# Patient Record
Sex: FEMALE
[De-identification: names, ages, dates, MRNs, and addresses within clinical notes are randomized per-mention and may not be internally consistent; named-entity substitution may affect disease eponyms.]

---

## 2018-06-03 ENCOUNTER — HOSPITAL (OUTPATIENT)
Dept: OTHER | Age: 83
End: 2018-06-03
Attending: EMERGENCY MEDICINE

## 2023-09-11 ENCOUNTER — OFFICE VISIT (OUTPATIENT)
Dept: MEDICAL GROUP | Facility: MEDICAL CENTER | Age: 88
End: 2023-09-11
Payer: MEDICARE

## 2023-09-11 VITALS
DIASTOLIC BLOOD PRESSURE: 68 MMHG | BODY MASS INDEX: 28.41 KG/M2 | WEIGHT: 154.4 LBS | HEART RATE: 67 BPM | HEIGHT: 62 IN | SYSTOLIC BLOOD PRESSURE: 128 MMHG | TEMPERATURE: 97.4 F | OXYGEN SATURATION: 93 % | RESPIRATION RATE: 18 BRPM

## 2023-09-11 DIAGNOSIS — Z86.79 S/P ABLATION OF ATRIAL FIBRILLATION: ICD-10-CM

## 2023-09-11 DIAGNOSIS — Z95.5 S/P CORONARY ARTERY STENT PLACEMENT: ICD-10-CM

## 2023-09-11 DIAGNOSIS — Z98.890 S/P ABLATION OF ATRIAL FIBRILLATION: ICD-10-CM

## 2023-09-11 DIAGNOSIS — R05.3 CHRONIC COUGH: ICD-10-CM

## 2023-09-11 DIAGNOSIS — G89.29 CHRONIC BILATERAL LOW BACK PAIN WITHOUT SCIATICA: ICD-10-CM

## 2023-09-11 DIAGNOSIS — R06.2 WHEEZING: ICD-10-CM

## 2023-09-11 DIAGNOSIS — I25.2 HISTORY OF MI (MYOCARDIAL INFARCTION): ICD-10-CM

## 2023-09-11 DIAGNOSIS — K21.9 GASTROESOPHAGEAL REFLUX DISEASE, UNSPECIFIED WHETHER ESOPHAGITIS PRESENT: ICD-10-CM

## 2023-09-11 DIAGNOSIS — J30.2 SEASONAL ALLERGIES: ICD-10-CM

## 2023-09-11 DIAGNOSIS — M54.50 CHRONIC BILATERAL LOW BACK PAIN WITHOUT SCIATICA: ICD-10-CM

## 2023-09-11 DIAGNOSIS — G89.29 CHRONIC PAIN OF BOTH KNEES: ICD-10-CM

## 2023-09-11 DIAGNOSIS — Z87.898 H/O WHEEZING: ICD-10-CM

## 2023-09-11 DIAGNOSIS — H35.30 MACULAR DEGENERATION OF RIGHT EYE, UNSPECIFIED TYPE: ICD-10-CM

## 2023-09-11 DIAGNOSIS — M25.561 CHRONIC PAIN OF BOTH KNEES: ICD-10-CM

## 2023-09-11 DIAGNOSIS — Z23 NEED FOR VACCINATION: ICD-10-CM

## 2023-09-11 DIAGNOSIS — M25.562 CHRONIC PAIN OF BOTH KNEES: ICD-10-CM

## 2023-09-11 PROCEDURE — G0009 ADMIN PNEUMOCOCCAL VACCINE: HCPCS | Performed by: STUDENT IN AN ORGANIZED HEALTH CARE EDUCATION/TRAINING PROGRAM

## 2023-09-11 PROCEDURE — 3078F DIAST BP <80 MM HG: CPT | Performed by: STUDENT IN AN ORGANIZED HEALTH CARE EDUCATION/TRAINING PROGRAM

## 2023-09-11 PROCEDURE — 99204 OFFICE O/P NEW MOD 45 MIN: CPT | Mod: 25 | Performed by: STUDENT IN AN ORGANIZED HEALTH CARE EDUCATION/TRAINING PROGRAM

## 2023-09-11 PROCEDURE — 90677 PCV20 VACCINE IM: CPT | Performed by: STUDENT IN AN ORGANIZED HEALTH CARE EDUCATION/TRAINING PROGRAM

## 2023-09-11 PROCEDURE — 3074F SYST BP LT 130 MM HG: CPT | Performed by: STUDENT IN AN ORGANIZED HEALTH CARE EDUCATION/TRAINING PROGRAM

## 2023-09-11 PROCEDURE — 90662 IIV NO PRSV INCREASED AG IM: CPT | Performed by: STUDENT IN AN ORGANIZED HEALTH CARE EDUCATION/TRAINING PROGRAM

## 2023-09-11 PROCEDURE — G0008 ADMIN INFLUENZA VIRUS VAC: HCPCS | Performed by: STUDENT IN AN ORGANIZED HEALTH CARE EDUCATION/TRAINING PROGRAM

## 2023-09-11 RX ORDER — ATORVASTATIN CALCIUM 80 MG/1
80 TABLET, FILM COATED ORAL NIGHTLY
COMMUNITY

## 2023-09-11 RX ORDER — TRAMADOL HYDROCHLORIDE 50 MG/1
25 TABLET ORAL 2 TIMES DAILY
Qty: 30 TABLET | Refills: 0 | Status: SHIPPED | OUTPATIENT
Start: 2023-09-11 | End: 2023-10-11

## 2023-09-11 RX ORDER — METOPROLOL SUCCINATE 50 MG/1
50 TABLET, EXTENDED RELEASE ORAL
COMMUNITY
Start: 2023-09-06

## 2023-09-11 RX ORDER — LANSOPRAZOLE 30 MG/1
30 CAPSULE, DELAYED RELEASE ORAL
COMMUNITY
Start: 2023-07-23 | End: 2023-09-11 | Stop reason: SDUPTHER

## 2023-09-11 RX ORDER — CLOPIDOGREL BISULFATE 75 MG/1
TABLET ORAL
COMMUNITY
Start: 2023-09-05

## 2023-09-11 RX ORDER — ALBUTEROL SULFATE 90 UG/1
2 AEROSOL, METERED RESPIRATORY (INHALATION) EVERY 4 HOURS PRN
Qty: 1 EACH | Refills: 1 | Status: SHIPPED | OUTPATIENT
Start: 2023-09-11 | End: 2023-11-16 | Stop reason: SDUPTHER

## 2023-09-11 RX ORDER — CELECOXIB 200 MG/1
200 CAPSULE ORAL EVERY MORNING
COMMUNITY
Start: 2023-09-06

## 2023-09-11 RX ORDER — LANSOPRAZOLE 30 MG/1
30 CAPSULE, DELAYED RELEASE ORAL
Qty: 90 CAPSULE | Refills: 0 | Status: SHIPPED | OUTPATIENT
Start: 2023-09-11 | End: 2023-12-11

## 2023-09-11 RX ORDER — TRAMADOL HYDROCHLORIDE 50 MG/1
25 TABLET ORAL 2 TIMES DAILY
COMMUNITY
Start: 2023-06-28 | End: 2023-09-11 | Stop reason: SDUPTHER

## 2023-09-11 ASSESSMENT — PATIENT HEALTH QUESTIONNAIRE - PHQ9: CLINICAL INTERPRETATION OF PHQ2 SCORE: 0

## 2023-09-11 NOTE — LETTER
[email or web address]  Tali López M.D.  [address]  [place] [address]  Fax: [phone]   Authorization for Release/Disclosure of   Protected Health Information   Name: TRAE FRENCH : 10/31/1927 SSN: [id number]   Address: [address] [place] [address]  [place] [address] Phone:    There are no phone numbers on file.   I authorize the entity listed below to release/disclose the PHI below to:   [facility]/Tali López M.D. and Tali López M.D.   Provider or Entity Name:     Address   City, State, Zip   Phone:      Fax:     Reason for request: continuity of care   Information to be released:    [  ] LAST COLONOSCOPY,  including any PATH REPORT and follow-up  [  ] LAST FIT/COLOGUARD RESULT [  ] LAST DEXA  [  ] LAST MAMMOGRAM  [  ] LAST PAP  [  ] LAST LABS [  ] RETINA EXAM REPORT  [  ] IMMUNIZATION RECORDS  [ xxxx ] Release all info      [  ] Check here and initial the line next to each item to release ALL health information INCLUDING  _____ Care and treatment for drug and / or alcohol abuse  _____ HIV testing, infection status, or AIDS  _____ Genetic Testing    DATES OF SERVICE OR TIME PERIOD TO BE DISCLOSED: _____________  I understand and acknowledge that:  * This Authorization may be revoked at any time by you in writing, except if your health information has already been used or disclosed.  * Your health information that will be used or disclosed as a result of you signing this authorization could be re-disclosed by the recipient. If this occurs, your re-disclosed health information may no longer be protected by State or Federal laws.  * You may refuse to sign this Authorization. Your refusal will not affect your ability to obtain treatment.  * This Authorization becomes effective upon signing and will  on (date) __________.      If no date is indicated, this Authorization will  one (1) year from the signature date.    Name: Trae French  Signature: Date:   2023     PLEASE FAX  REQUESTED RECORDS BACK TO: [phone]

## 2023-09-11 NOTE — PROGRESS NOTES
Subjective:   New patient     CC:  Diagnoses of Need for vaccination, History of MI (myocardial infarction), S/P coronary artery stent placement, Chronic bilateral low back pain without sciatica, Chronic pain of both knees, S/P ablation of atrial fibrillation, Chronic cough, H/O wheezing, Wheezing, Seasonal allergies, Macular degeneration of right eye, unspecified type, and Gastroesophageal reflux disease, unspecified whether esophagitis present were pertinent to this visit.    HISTORY OF THE PRESENT ILLNESS: Patient is a 89 y.o. female. This pleasant patient is here today to establish care and discuss chronic medical concerns.  Patient moved to [place] from [facility] recently and is living with her daughter.  Patient accompanied by her daughter and granddaughter who is also a pharmacist.      Problem   S/P Coronary Artery Stent Placement    2020 had acute MI   3 stents placed   On Plavix 75 mg daily. No aspirin   Metoprolol 50 mg daily         Chronic Pain of Both Knees    Chronic,   Getting steroid shots - in [place] from orthopedics   Currently on celecoxib 200 mg BI and tramadol 25 mg BID as needed     S/P Ablation of Atrial Fibrillation    Patient with history of atrial fibrillation.  Now status post ablation  She is currently on metoprolol 50 mg once daily not on anticoagulation therapy.  She takes Plavix 75 mg for history of CAD with stent  Following up with cardiology     Chronic Cough    Patient with history of chronic cough and seasonal allergies.  Patient and family showing concern for possible asthma as mentioned by one of her previous providers.  Less likely but we will get PFTs done to confirm.  Requesting refill for albuterol inhaler.         Health Maintenance: Completed    ROS:   Review of Systems   Constitutional:  Negative for fever and malaise/fatigue.   HENT:  Negative for congestion and sore throat.    Eyes:  Positive for blurred vision.   Respiratory:  Positive for cough. Negative for shortness  "of breath and wheezing.    Cardiovascular:  Negative for chest pain, palpitations and leg swelling.   Gastrointestinal:  Negative for blood in stool, heartburn and nausea.   Genitourinary:  Negative for dysuria and urgency.   Musculoskeletal:  Positive for back pain and joint pain. Negative for falls and myalgias.   Neurological:  Negative for dizziness and headaches.   Psychiatric/Behavioral:  Negative for depression and suicidal ideas.          Objective:       Exam: /68 (BP Location: Left arm, Patient Position: Sitting, BP Cuff Size: Large adult)   Pulse 67   Temp 36.3 °C (97.4 °F) (Temporal)   Resp 18   Ht 1.575 m (5' 2\")   Wt 70 kg (154 lb 6.4 oz)   SpO2 93%  Body mass index is 28.24 kg/m².    Physical Exam  Constitutional:       Appearance: Normal appearance.   HENT:      Head: Normocephalic.   Eyes:      General: No scleral icterus.  Cardiovascular:      Rate and Rhythm: Normal rate and regular rhythm.      Pulses: Normal pulses.      Heart sounds: Normal heart sounds.   Pulmonary:      Effort: Pulmonary effort is normal.      Breath sounds: Normal breath sounds.   Musculoskeletal:      Right lower leg: No edema.      Left lower leg: No edema.   Skin:     General: Skin is warm.   Neurological:      Mental Status: She is alert and oriented to person, place, and time.   Psychiatric:         Mood and Affect: Mood normal.         Behavior: Behavior normal.         Labs: Reviewed    Assessment & Plan:   89 y.o. female with the following -     Chronic cough   Wheezing    Patient with history of chronic dry cough and intermittent episodes of wheezing.  Patient and family member requesting for PFTs  Plan  - PULMONARY FUNCTION TESTS -Test requested: Spirometry with-out & with Bronchodilator; Future  - albuterol 108 (90 Base) MCG/ACT Aero Soln inhalation aerosol; Inhale 2 Puffs every four hours as needed for Shortness of Breath.  Dispense: 1 Each; Refill:     History of MI (myocardial infarction)  S/P " coronary artery stent placement   S/P ablation of atrial fibrillation  Chronic, stable  Continue Plavix 75 mg, Lipitor 80 mg daily.  Continue metoprolol 50 mg once a day.  Would place a referral to cardiology for follow-up  - REFERRAL TO CARDIOLOGY    Chronic bilateral low back pain without sciatica  Chronic pain of both knees  Patient with history of chronic pain in both knee and low back pain . Previously following up with pain specialist in [place] and getting intra-articular joint steroid shots.  Patient requesting referral to pain specialist or physiatrist  - Referral to Physiatry (PMR)  - traMADol (ULTRAM) 50 MG Tab; Take 0.5 Tablets by mouth 2 times a day for 30 days.  Dispense: 30 Tablet; Refill: 0  - Consent for Opiate Prescription  - Controlled Substance Treatment Agreement    Discussed risk benefit with tramadol including dizziness, drowsiness , overdose risk. Patient reports taking this medication for many years and reports understand the risk.  Controlled substance discussed with client. patient agrees to abide by controlled substance contract. CS contract discussed with patient , scanned in chart.       Gastroesophageal reflux disease, unspecified whether esophagitis present  Chronic, stable  Refill of lansoprazole 30 mg daily sent to pharmacy  - lansoprazole (PREVACID) 30 MG CAPSULE DELAYED RELEASE; Take 1 Capsule by mouth every day.  Dispense: 90 Capsule; Refill: 0    Need for vaccination  Due for annual flu vaccination and pneumonia vaccination  - Influenza Vaccine, High Dose (65+ Only)  - Pneumococcal Conjugate Vaccine 20-Valent (19 yrs+)      Return in about 3 months (around 12/11/2023) for chronic problems.    Please note that this dictation was created using voice recognition software. I have made every reasonable attempt to correct obvious errors, but I expect that there are errors of grammar and possibly content that I did not discover before finalizing the note.

## 2023-09-14 ENCOUNTER — NON-PROVIDER VISIT (OUTPATIENT)
Dept: SLEEP MEDICINE | Facility: MEDICAL CENTER | Age: 88
End: 2023-09-14
Attending: STUDENT IN AN ORGANIZED HEALTH CARE EDUCATION/TRAINING PROGRAM
Payer: MEDICARE

## 2023-09-14 VITALS — WEIGHT: 154 LBS | BODY MASS INDEX: 30.23 KG/M2 | HEIGHT: 60 IN

## 2023-09-14 DIAGNOSIS — R05.3 CHRONIC COUGH: ICD-10-CM

## 2023-09-14 DIAGNOSIS — R06.2 WHEEZING: ICD-10-CM

## 2023-09-14 PROCEDURE — 94060 EVALUATION OF WHEEZING: CPT | Mod: 26 | Performed by: INTERNAL MEDICINE

## 2023-09-14 PROCEDURE — 94060 EVALUATION OF WHEEZING: CPT | Performed by: STUDENT IN AN ORGANIZED HEALTH CARE EDUCATION/TRAINING PROGRAM

## 2023-09-14 ASSESSMENT — PULMONARY FUNCTION TESTS
FEV1/FVC_PREDICTED: 70.8
FEV1_PERCENT_PREDICTED: 116
FEV1/FVC_PERCENT_PREDICTED: 130
FEV1_PERCENT_CHANGE: 3
FEV1/FVC_PERCENT_PREDICTED: 130
FEV1: 1.1
FVC_PERCENT_PREDICTED: 89
FVC: 1.22
FEV1/FVC: 92
FEV1: 1.13
FVC_PREDICTED: 1.37
FEV1_PREDICTED: 113
FEV1/FVC: 92.62
FVC: 1.19
FEV1/FVC_PERCENT_CHANGE: 150
FVC_PERCENT_PREDICTED: 87
FEV1_PERCENT_CHANGE: 2
FEV1_PREDICTED: 0.97

## 2023-09-14 NOTE — PROCEDURES
Technician: Wendy Paulino, RRT, CPFT  Tech notes:  Good pt effort and cooperation. ATS standards not met for reproducibility; BEST EFFORTS REPORTED.  Albuterol HFA 2 puffs via spacer administered.    Interpretation:  There is no significant obstructive ventilatory defect on spirometry.  There is no significant response to bronchodilators.    Flow volume loop is consistent with the above interpretation.    No prior PFTs for comparison.    ITito M.D. am the author of this note (PFT interpretation).    __________  Tito Montgomery MD  Pulmonary and Critical Care Medicine  [facility]

## 2023-09-19 ASSESSMENT — ENCOUNTER SYMPTOMS
FEVER: 0
DEPRESSION: 0
SORE THROAT: 0
PALPITATIONS: 0
DIZZINESS: 0
HEADACHES: 0
BLURRED VISION: 1
COUGH: 1
FALLS: 0
BACK PAIN: 1
HEARTBURN: 0
NAUSEA: 0
BLOOD IN STOOL: 0
MYALGIAS: 0
SHORTNESS OF BREATH: 0
WHEEZING: 0

## 2023-09-26 ENCOUNTER — TELEPHONE (OUTPATIENT)
Dept: HEALTH INFORMATION MANAGEMENT | Facility: OTHER | Age: 88
End: 2023-09-26
Payer: MEDICARE

## 2023-10-12 ENCOUNTER — OFFICE VISIT (OUTPATIENT)
Dept: MEDICAL GROUP | Facility: MEDICAL CENTER | Age: 88
End: 2023-10-12
Payer: MEDICARE

## 2023-10-12 VITALS
BODY MASS INDEX: 29.74 KG/M2 | RESPIRATION RATE: 20 BRPM | HEART RATE: 78 BPM | SYSTOLIC BLOOD PRESSURE: 108 MMHG | WEIGHT: 151.46 LBS | DIASTOLIC BLOOD PRESSURE: 64 MMHG | HEIGHT: 60 IN | TEMPERATURE: 97.9 F | OXYGEN SATURATION: 94 %

## 2023-10-12 DIAGNOSIS — J40 BRONCHITIS: Primary | ICD-10-CM

## 2023-10-12 DIAGNOSIS — J06.9 UPPER RESPIRATORY TRACT INFECTION, UNSPECIFIED TYPE: ICD-10-CM

## 2023-10-12 DIAGNOSIS — M25.562 CHRONIC PAIN OF BOTH KNEES: ICD-10-CM

## 2023-10-12 DIAGNOSIS — G89.29 CHRONIC PAIN OF BOTH KNEES: ICD-10-CM

## 2023-10-12 DIAGNOSIS — M25.561 CHRONIC PAIN OF BOTH KNEES: ICD-10-CM

## 2023-10-12 PROBLEM — M17.0 PRIMARY OSTEOARTHRITIS OF BOTH KNEES: Status: ACTIVE | Noted: 2023-10-12

## 2023-10-12 PROCEDURE — 99214 OFFICE O/P EST MOD 30 MIN: CPT | Performed by: STUDENT IN AN ORGANIZED HEALTH CARE EDUCATION/TRAINING PROGRAM

## 2023-10-12 PROCEDURE — 3074F SYST BP LT 130 MM HG: CPT | Performed by: STUDENT IN AN ORGANIZED HEALTH CARE EDUCATION/TRAINING PROGRAM

## 2023-10-12 PROCEDURE — 3078F DIAST BP <80 MM HG: CPT | Performed by: STUDENT IN AN ORGANIZED HEALTH CARE EDUCATION/TRAINING PROGRAM

## 2023-10-12 RX ORDER — CETIRIZINE HYDROCHLORIDE 10 MG/1
10 TABLET ORAL DAILY
COMMUNITY

## 2023-10-12 RX ORDER — AMOXICILLIN AND CLAVULANATE POTASSIUM 875; 125 MG/1; MG/1
1 TABLET, FILM COATED ORAL 2 TIMES DAILY
Qty: 14 TABLET | Refills: 0 | Status: SHIPPED | OUTPATIENT
Start: 2023-10-12 | End: 2023-10-19

## 2023-10-12 RX ORDER — TRAMADOL HYDROCHLORIDE 50 MG/1
50 TABLET ORAL EVERY 8 HOURS PRN
Qty: 60 TABLET | Refills: 0 | Status: SHIPPED | OUTPATIENT
Start: 2023-10-12 | End: 2023-11-11

## 2023-10-12 RX ORDER — FLUTICASONE PROPIONATE 50 MCG
1 SPRAY, SUSPENSION (ML) NASAL DAILY
COMMUNITY

## 2023-10-22 ASSESSMENT — ENCOUNTER SYMPTOMS
HEADACHES: 1
WHEEZING: 0
BLURRED VISION: 0
FEVER: 0
SHORTNESS OF BREATH: 0
BLOOD IN STOOL: 0
PALPITATIONS: 0
HEARTBURN: 0
MYALGIAS: 0
NECK PAIN: 1
COUGH: 0
FALLS: 0
DEPRESSION: 0
NAUSEA: 0
DIZZINESS: 0
SORE THROAT: 0
BACK PAIN: 1

## 2023-10-23 NOTE — PROGRESS NOTES
Subjective:     CC: Upper respiratory symptoms and cough     HPI:   Chantelle presents today with  Chief Complaint   Patient presents with    Cough     Clear mucus, runny nose x 1 week 1/12       HPI:  Chantelle Sánchez is a 89 y.o. female here for nasal congestion. Cough and chest congestion symptoms for more than a week with slight worsening of symptoms, mild sinus congestion but no pain. She has tried OTC cough medications and nasal spray without much improvement . Denies any current fever,chills,ear pain, sore throat ,SOB or chest pain.       Health Maintenance: Completed    ROS:  Review of Systems   Constitutional:  Negative for fever and malaise/fatigue.   HENT:  Negative for congestion and sore throat.    Eyes:  Negative for blurred vision.   Respiratory:  Negative for cough, shortness of breath and wheezing.    Cardiovascular:  Negative for chest pain, palpitations and leg swelling.   Gastrointestinal:  Negative for blood in stool, heartburn and nausea.   Genitourinary:  Negative for dysuria and urgency.   Musculoskeletal:  Positive for back pain, joint pain and neck pain. Negative for falls and myalgias.   Neurological:  Positive for headaches. Negative for dizziness.   Psychiatric/Behavioral:  Negative for depression and suicidal ideas.        Please see HPI for additional ROS.      Objective:     Exam:  /64 (BP Location: Left arm, Patient Position: Sitting, BP Cuff Size: Large adult)   Pulse 78   Temp 36.6 °C (97.9 °F) (Temporal)   Resp 20   Ht 1.524 m (5') Comment: Pt reported  Wt 68.7 kg (151 lb 7.3 oz)   SpO2 94%   BMI 29.58 kg/m²  Body mass index is 29.58 kg/m².    Physical Exam  Constitutional:       Appearance: Normal appearance.   HENT:      Head: Normocephalic.   Eyes:      General: No scleral icterus.  Cardiovascular:      Rate and Rhythm: Normal rate and regular rhythm.      Pulses: Normal pulses.      Heart sounds: Normal heart sounds.   Pulmonary:      Effort: Pulmonary effort is  normal.      Breath sounds: Normal breath sounds.   Musculoskeletal:      Right lower leg: No edema.      Left lower leg: No edema.   Skin:     General: Skin is warm.   Neurological:      Mental Status: She is alert and oriented to person, place, and time.   Psychiatric:         Mood and Affect: Mood normal.         Behavior: Behavior normal.             Labs: reviewed     Assessment & Plan:     89 y.o. female with the following -     1. Bronchitis  Acute symptoms of cough and URI associated with mild wheezing . Symptoms lasting more than a week.  Would consider antibiotics as patient is high risk for developing bacterial infection with her chronic medical conditions .  - amoxicillin-clavulanate (AUGMENTIN) 875-125 MG Tab; Take 1 Tablet by mouth 2 times a day for 7 days.  Dispense: 14 Tablet; Refill: 0    2. Upper respiratory tract infection, unspecified type  Acute , with mild worsening of symptoms.given symptoms for more than 7 days, would consider antibiotics treatment for possible bacterial superinfection.  Good hydration, OTC tylenol and cough medications as needed  amoxicillin-clavulanate (AUGMENTIN) 875-125 MG Tab; Take 1 Tablet by mouth 2 times a day for 7 days.  Dispense: 14 Tablet; Refill: 0    3. Chronic pain of both knees  Chronic, stable   Patient requesting for refill of her tramadol   Discussed risk and benefits . Discussed BEERS criteria in elderly population . Patient verbalized understanding and states she has been on this pain medication for many years and she is aware of risk. Would like to get the refill and to stop the medication if ay side effects  Plan:  PDMP reviewed   Refill given for a month   Controlled substance contract on file .  - traMADol (ULTRAM) 50 MG Tab; Take 1 Tablet by mouth every 8 hours as needed for Severe Pain for up to 30 days.  Dispense: 60 Tablet; Refill: 0  - Consent for Opiate Prescription        F/u in 3 months for chronic problems    Please note that this dictation  was created using voice recognition software. I have made every reasonable attempt to correct obvious errors, but I expect that there are errors of grammar and possibly content that I did not discover before finalizing the note.

## 2023-11-16 DIAGNOSIS — R06.2 WHEEZING: ICD-10-CM

## 2023-11-17 RX ORDER — ALBUTEROL SULFATE 90 UG/1
2 AEROSOL, METERED RESPIRATORY (INHALATION) EVERY 4 HOURS PRN
Qty: 1 EACH | Refills: 1 | Status: SHIPPED | OUTPATIENT
Start: 2023-11-17

## 2023-11-27 ENCOUNTER — APPOINTMENT (RX ONLY)
Dept: URBAN - METROPOLITAN AREA CLINIC 6 | Facility: CLINIC | Age: 88
Setting detail: DERMATOLOGY
End: 2023-11-27

## 2023-11-27 DIAGNOSIS — L57.0 ACTINIC KERATOSIS: ICD-10-CM

## 2023-11-27 DIAGNOSIS — Z71.89 OTHER SPECIFIED COUNSELING: ICD-10-CM

## 2023-11-27 DIAGNOSIS — D69.2 OTHER NONTHROMBOCYTOPENIC PURPURA: ICD-10-CM

## 2023-11-27 DIAGNOSIS — L81.4 OTHER MELANIN HYPERPIGMENTATION: ICD-10-CM

## 2023-11-27 DIAGNOSIS — L82.1 OTHER SEBORRHEIC KERATOSIS: ICD-10-CM

## 2023-11-27 DIAGNOSIS — B07.8 OTHER VIRAL WARTS: ICD-10-CM

## 2023-11-27 PROBLEM — D48.5 NEOPLASM OF UNCERTAIN BEHAVIOR OF SKIN: Status: ACTIVE | Noted: 2023-11-27

## 2023-11-27 PROCEDURE — ? COUNSELING

## 2023-11-27 PROCEDURE — ? SUNSCREEN TREATMENT REGIMEN

## 2023-11-27 PROCEDURE — 11102 TANGNTL BX SKIN SINGLE LES: CPT | Mod: 59

## 2023-11-27 PROCEDURE — ? PHOTO-DOCUMENTATION

## 2023-11-27 PROCEDURE — 99203 OFFICE O/P NEW LOW 30 MIN: CPT | Mod: 25

## 2023-11-27 PROCEDURE — ? BIOPSY BY SHAVE METHOD

## 2023-11-27 PROCEDURE — 11103 TANGNTL BX SKIN EA SEP/ADDL: CPT | Mod: 59

## 2023-11-27 PROCEDURE — ? SUNSCREEN RECOMMENDATIONS

## 2023-11-27 PROCEDURE — 17003 DESTRUCT PREMALG LES 2-14: CPT | Mod: 59

## 2023-11-27 PROCEDURE — ? LIQUID NITROGEN

## 2023-11-27 PROCEDURE — 17000 DESTRUCT PREMALG LESION: CPT | Mod: 59

## 2023-11-27 PROCEDURE — 17110 DESTRUCTION B9 LES UP TO 14: CPT

## 2023-11-27 ASSESSMENT — LOCATION DETAILED DESCRIPTION DERM
LOCATION DETAILED: LEFT INFERIOR FOREHEAD
LOCATION DETAILED: RIGHT MEDIAL SUPERIOR CHEST
LOCATION DETAILED: RIGHT NASAL ALA
LOCATION DETAILED: LEFT INFERIOR MEDIAL FOREHEAD
LOCATION DETAILED: RIGHT MEDIAL BREAST 1-2:00 REGION
LOCATION DETAILED: RIGHT DISTAL POSTERIOR UPPER ARM
LOCATION DETAILED: RIGHT LATERAL BREAST 6-7:00 REGION
LOCATION DETAILED: RIGHT PROXIMAL DORSAL FOREARM
LOCATION DETAILED: LEFT SUPERIOR LATERAL MALAR CHEEK
LOCATION DETAILED: LEFT PROXIMAL DORSAL FOREARM
LOCATION DETAILED: LEFT LATERAL SUPERIOR CHEST
LOCATION DETAILED: RIGHT VENTRAL PROXIMAL FOREARM
LOCATION DETAILED: RIGHT MEDIAL EYEBROW
LOCATION DETAILED: RIGHT FOREHEAD
LOCATION DETAILED: LEFT DISTAL POSTERIOR UPPER ARM
LOCATION DETAILED: RIGHT PROXIMAL RADIAL DORSAL FOREARM
LOCATION DETAILED: LEFT VENTRAL PROXIMAL FOREARM

## 2023-11-27 ASSESSMENT — LOCATION SIMPLE DESCRIPTION DERM
LOCATION SIMPLE: RIGHT FOREHEAD
LOCATION SIMPLE: LEFT FOREHEAD
LOCATION SIMPLE: RIGHT POSTERIOR UPPER ARM
LOCATION SIMPLE: LEFT POSTERIOR UPPER ARM
LOCATION SIMPLE: RIGHT NOSE
LOCATION SIMPLE: LEFT FOREARM
LOCATION SIMPLE: RIGHT FOREARM
LOCATION SIMPLE: LEFT CHEEK
LOCATION SIMPLE: RIGHT EYEBROW
LOCATION SIMPLE: RIGHT BREAST
LOCATION SIMPLE: CHEST

## 2023-11-27 ASSESSMENT — LOCATION ZONE DERM
LOCATION ZONE: NOSE
LOCATION ZONE: ARM
LOCATION ZONE: TRUNK
LOCATION ZONE: FACE

## 2023-11-27 NOTE — PROCEDURE: LIQUID NITROGEN
Post-Care Instructions: I reviewed with the patient in detail post-care instructions. Patient is to wear sunprotection, and avoid picking at any of the treated lesions. Pt may apply Vaseline to crusted or scabbing areas.
Duration Of Freeze Thaw-Cycle (Seconds): 10-15
Detail Level: Detailed
Show Spray Paint Technique Variable?: Yes
Medical Necessity Information: It is in your best interest to select a reason for this procedure from the list below. All of these items fulfill various CMS LCD requirements except the new and changing color options.
Spray Paint Text: The liquid nitrogen was applied to the skin utilizing a spray paint frosting technique.
Add 52 Modifier (Optional): no
Consent: The patient's consent was obtained including but not limited to risks of crusting, scabbing, blistering, scarring, darker or lighter pigmentary change, recurrence, incomplete removal and infection.
Medical Necessity Clause: This procedure was medically necessary because the lesions that were treated were:
Number Of Freeze-Thaw Cycles: 3 freeze-thaw cycles
Number Of Freeze-Thaw Cycles: 2 freeze-thaw cycles
Duration Of Freeze Thaw-Cycle (Seconds): 10

## 2023-11-29 ENCOUNTER — PATIENT MESSAGE (OUTPATIENT)
Dept: HEALTH INFORMATION MANAGEMENT | Facility: OTHER | Age: 88
End: 2023-11-29

## 2023-12-07 ENCOUNTER — RX ONLY (OUTPATIENT)
Age: 88
Setting detail: RX ONLY
End: 2023-12-07

## 2023-12-07 RX ORDER — FLUOROURACIL 5 MG/G
CREAM TOPICAL
Qty: 40 | Refills: 1 | Status: ERX | COMMUNITY
Start: 2023-12-06

## 2023-12-08 DIAGNOSIS — K21.9 GASTROESOPHAGEAL REFLUX DISEASE, UNSPECIFIED WHETHER ESOPHAGITIS PRESENT: ICD-10-CM

## 2023-12-11 RX ORDER — LANSOPRAZOLE 30 MG/1
30 CAPSULE, DELAYED RELEASE ORAL
Qty: 90 CAPSULE | Refills: 0 | Status: SHIPPED | OUTPATIENT
Start: 2023-12-11 | End: 2024-03-11

## 2024-01-02 ENCOUNTER — APPOINTMENT (OUTPATIENT)
Dept: RADIOLOGY | Facility: IMAGING CENTER | Age: 89
End: 2024-01-02
Attending: PHYSICIAN ASSISTANT
Payer: MEDICARE

## 2024-01-02 ENCOUNTER — OFFICE VISIT (OUTPATIENT)
Dept: URGENT CARE | Facility: CLINIC | Age: 89
End: 2024-01-02
Payer: MEDICARE

## 2024-01-02 VITALS
TEMPERATURE: 97.7 F | BODY MASS INDEX: 29.45 KG/M2 | SYSTOLIC BLOOD PRESSURE: 110 MMHG | RESPIRATION RATE: 18 BRPM | DIASTOLIC BLOOD PRESSURE: 64 MMHG | HEIGHT: 60 IN | WEIGHT: 150 LBS | OXYGEN SATURATION: 95 % | HEART RATE: 72 BPM

## 2024-01-02 DIAGNOSIS — J22 LRTI (LOWER RESPIRATORY TRACT INFECTION): ICD-10-CM

## 2024-01-02 PROCEDURE — 99214 OFFICE O/P EST MOD 30 MIN: CPT | Performed by: PHYSICIAN ASSISTANT

## 2024-01-02 PROCEDURE — 3078F DIAST BP <80 MM HG: CPT | Performed by: PHYSICIAN ASSISTANT

## 2024-01-02 PROCEDURE — 3074F SYST BP LT 130 MM HG: CPT | Performed by: PHYSICIAN ASSISTANT

## 2024-01-02 PROCEDURE — 71046 X-RAY EXAM CHEST 2 VIEWS: CPT | Mod: TC | Performed by: PHYSICIAN ASSISTANT

## 2024-01-02 RX ORDER — FLUOROURACIL 50 MG/G
CREAM TOPICAL
COMMUNITY
Start: 2023-12-06

## 2024-01-02 RX ORDER — DOXYCYCLINE HYCLATE 100 MG
100 TABLET ORAL 2 TIMES DAILY
Qty: 14 TABLET | Refills: 0 | Status: SHIPPED | OUTPATIENT
Start: 2024-01-02 | End: 2024-01-09

## 2024-01-02 ASSESSMENT — ENCOUNTER SYMPTOMS
MYALGIAS: 0
WHEEZING: 0
VOMITING: 0
DIZZINESS: 0
NAUSEA: 0
DIARRHEA: 0
PALPITATIONS: 0
CHILLS: 0
SPUTUM PRODUCTION: 1
ABDOMINAL PAIN: 0
SINUS PAIN: 0
DIAPHORESIS: 0
HEADACHES: 0
SORE THROAT: 0
FEVER: 0
COUGH: 1
SHORTNESS OF BREATH: 0

## 2024-01-02 NOTE — PROGRESS NOTES
Subjective:     CHIEF COMPLAINT  Chief Complaint   Patient presents with    Cough     Going on for a while about 7 days daughter states she is also prone to pneumonia and bronchitis.  Daughter states that she has had all her shots and have not been around people who have the virus        HPI  Chantelle Sánchez is a very pleasant 89 y.o. female who presents to the clinic accompanied by her daughter.  Patient has been experiencing cough, sinus congestion and fatigue for the last 7-10 days.  Cough is productive of discolored sputum.  No associated shortness of breath or chest pain.  Has had pneumonia and bronchitis multiple times in the past.  Would like to rule this out.  Has not been running fever.  No body aches, chills or sore throat.  No known ill contacts.  Currently taking Coricidin without any significant improvement.  No history of asthma or COPD.  Non-smoker.    REVIEW OF SYSTEMS  Review of Systems   Constitutional:  Positive for malaise/fatigue. Negative for chills, diaphoresis and fever.   HENT:  Positive for congestion. Negative for ear pain, sinus pain and sore throat.    Respiratory:  Positive for cough and sputum production. Negative for shortness of breath and wheezing.    Cardiovascular:  Negative for chest pain and palpitations.   Gastrointestinal:  Negative for abdominal pain, diarrhea, nausea and vomiting.   Musculoskeletal:  Negative for myalgias.   Neurological:  Negative for dizziness and headaches.   Endo/Heme/Allergies:  Positive for environmental allergies.       PAST MEDICAL HISTORY  Patient Active Problem List    Diagnosis Date Noted    Primary osteoarthritis of both knees 10/12/2023    History of MI (myocardial infarction) 09/11/2023    S/P coronary artery stent placement 09/11/2023    Chronic bilateral low back pain without sciatica 09/11/2023    Chronic pain of both knees 09/11/2023    S/P ablation of atrial fibrillation 09/11/2023    Chronic cough 09/11/2023    Seasonal allergies  09/11/2023    Macular degeneration of right eye 09/11/2023       SURGICAL HISTORY   has a past surgical history that includes rhinoplasty; knee arthroscopy; cataract extraction with iol; other orthopedic surgery; and carpal tunnel release.    ALLERGIES  No Known Allergies    CURRENT MEDICATIONS  Home Medications       Reviewed by Alonso Garcia P.A.-C. (Physician Assistant) on 01/02/24 at 1137  Med List Status: <None>     Medication Last Dose Status   albuterol 108 (90 Base) MCG/ACT Aero Soln inhalation aerosol PRN Active   atorvastatin (LIPITOR) 80 MG tablet Taking Active   celecoxib (CELEBREX) 200 MG Cap Taking Active   cetirizine (ZYRTEC) 10 MG Tab Taking Active   clopidogrel (PLAVIX) 75 MG Tab Taking Active   Dextromethorphan-guaiFENesin (MUCINEX DM PO) Taking Active   fluorouracil (EFUDEX) 5 % cream Taking Active   fluticasone (FLONASE) 50 MCG/ACT nasal spray Taking Active   lansoprazole (PREVACID) 30 MG CAPSULE DELAYED RELEASE Taking Active   metoprolol SR (TOPROL XL) 50 MG TABLET SR 24 HR Taking Active                    SOCIAL HISTORY  Social History     Tobacco Use    Smoking status: Never    Smokeless tobacco: Never   Vaping Use    Vaping Use: Never used   Substance and Sexual Activity    Alcohol use: Not Currently    Drug use: Not Currently    Sexual activity: Not Currently       FAMILY HISTORY  History reviewed. No pertinent family history.       Objective:     VITAL SIGNS: /64   Pulse 72   Temp 36.5 °C (97.7 °F) (Temporal)   Resp 18   Ht 1.524 m (5')   Wt 68 kg (150 lb)   SpO2 95%   BMI 29.29 kg/m²     PHYSICAL EXAM  Physical Exam  Constitutional:       General: She is not in acute distress.     Appearance: Normal appearance. She is not ill-appearing, toxic-appearing or diaphoretic.   HENT:      Head: Normocephalic and atraumatic.      Right Ear: Tympanic membrane, ear canal and external ear normal.      Left Ear: Tympanic membrane, ear canal and external ear normal.      Nose: Congestion  and rhinorrhea present.      Mouth/Throat:      Mouth: Mucous membranes are moist.      Pharynx: No oropharyngeal exudate or posterior oropharyngeal erythema.   Eyes:      Conjunctiva/sclera: Conjunctivae normal.   Cardiovascular:      Rate and Rhythm: Normal rate and regular rhythm.      Pulses: Normal pulses.      Heart sounds: Normal heart sounds.   Pulmonary:      Effort: Pulmonary effort is normal.      Breath sounds: Normal breath sounds. No wheezing, rhonchi or rales.   Musculoskeletal:      Cervical back: Normal range of motion. No muscular tenderness.   Lymphadenopathy:      Cervical: No cervical adenopathy.   Skin:     General: Skin is warm and dry.      Capillary Refill: Capillary refill takes less than 2 seconds.   Neurological:      Mental Status: She is alert.   Psychiatric:         Mood and Affect: Mood normal.         Thought Content: Thought content normal.       RADIOLOGY RESULTS   DX-CHEST-2 VIEWS    Result Date: 1/2/2024 1/2/2024 11:45 AM HISTORY/REASON FOR EXAM:  Cough TECHNIQUE/EXAM DESCRIPTION: PA and lateral views of the chest. COMPARISON:  None. FINDINGS: The lungs are clear. The cardiac silhouette is normal in size. No effusions or pneumothoraces are present. There are no significant osseous abnormalities. The visualized portions of the upper abdomen are within normal limits.     NEGATIVE TWO VIEWS OF THE CHEST.           Assessment/Plan:     1. LRTI (lower respiratory tract infection)  DX-CHEST-2 VIEWS    doxycycline (VIBRAMYCIN) 100 MG Tab          MDM/Comments:    Very pleasant and well-appearing 89-year-old female presents to the clinic accompanied by her daughter.  Patient has had sinus congestion, chest congestion and cough x 7-10 days.  On exam lung sounds are clear to auscultation.  No wheezes rhonchi or rales.  SpO2 95% on room air.  Chest x-ray was performed as she has a long history of pneumonia in the past.  No evidence of cardiopulmonary abnormality appreciated.  Believe this  is likely postviral in nature.  OTC supportive recommendations were discussed at length.  Contingent antibiotic prescription provided shall she meet criteria discussed.    Differential diagnosis, natural history, supportive care, and indications for immediate follow-up discussed. All questions answered. Patient agrees with the plan of care.    Follow-up as needed if symptoms worsen or fail to improve to PCP, Urgent care or Emergency Room.    I have personally reviewed prior external notes and test results pertinent to today's visit.  I have independently reviewed and interpreted all diagnostics ordered during this urgent care acute visit.   Discussed management options (risks,benefits, and alternatives to treatment). Pt expresses understanding and the treatment plan was agreed upon. Questions were encouraged and answered to pt's satisfaction.    Please note that this dictation was created using voice recognition software. I have made a reasonable attempt to correct obvious errors, but I expect that there are errors of grammar and possibly content that I did not discover before finalizing the note.

## 2024-01-16 ENCOUNTER — OFFICE VISIT (OUTPATIENT)
Dept: MEDICAL GROUP | Facility: MEDICAL CENTER | Age: 89
End: 2024-01-16
Payer: MEDICARE

## 2024-01-16 VITALS
HEART RATE: 76 BPM | BODY MASS INDEX: 29.88 KG/M2 | SYSTOLIC BLOOD PRESSURE: 130 MMHG | WEIGHT: 152.2 LBS | HEIGHT: 60 IN | OXYGEN SATURATION: 97 % | DIASTOLIC BLOOD PRESSURE: 72 MMHG | TEMPERATURE: 97.4 F

## 2024-01-16 DIAGNOSIS — G89.29 CHRONIC PAIN OF BOTH KNEES: ICD-10-CM

## 2024-01-16 DIAGNOSIS — M54.50 CHRONIC BILATERAL LOW BACK PAIN WITHOUT SCIATICA: ICD-10-CM

## 2024-01-16 DIAGNOSIS — J40 BRONCHITIS WITH ACUTE WHEEZING: Primary | ICD-10-CM

## 2024-01-16 DIAGNOSIS — M25.562 CHRONIC PAIN OF BOTH KNEES: ICD-10-CM

## 2024-01-16 DIAGNOSIS — J01.10 SUBACUTE FRONTAL SINUSITIS: ICD-10-CM

## 2024-01-16 DIAGNOSIS — G89.29 CHRONIC BILATERAL LOW BACK PAIN WITHOUT SCIATICA: ICD-10-CM

## 2024-01-16 DIAGNOSIS — M25.561 CHRONIC PAIN OF BOTH KNEES: ICD-10-CM

## 2024-01-16 PROCEDURE — 3078F DIAST BP <80 MM HG: CPT | Performed by: STUDENT IN AN ORGANIZED HEALTH CARE EDUCATION/TRAINING PROGRAM

## 2024-01-16 PROCEDURE — 3075F SYST BP GE 130 - 139MM HG: CPT | Performed by: STUDENT IN AN ORGANIZED HEALTH CARE EDUCATION/TRAINING PROGRAM

## 2024-01-16 PROCEDURE — 99214 OFFICE O/P EST MOD 30 MIN: CPT | Mod: 25 | Performed by: STUDENT IN AN ORGANIZED HEALTH CARE EDUCATION/TRAINING PROGRAM

## 2024-01-16 PROCEDURE — 94640 AIRWAY INHALATION TREATMENT: CPT | Performed by: STUDENT IN AN ORGANIZED HEALTH CARE EDUCATION/TRAINING PROGRAM

## 2024-01-16 RX ORDER — AMOXICILLIN AND CLAVULANATE POTASSIUM 875; 125 MG/1; MG/1
1 TABLET, FILM COATED ORAL 2 TIMES DAILY
Qty: 14 TABLET | Refills: 0 | Status: ON HOLD | OUTPATIENT
Start: 2024-01-16 | End: 2024-01-23

## 2024-01-16 RX ORDER — METHYLPREDNISOLONE 4 MG/1
TABLET ORAL
Qty: 21 TABLET | Refills: 0 | Status: SHIPPED | OUTPATIENT
Start: 2024-01-16 | End: 2024-01-23

## 2024-01-16 RX ORDER — NALOXONE HYDROCHLORIDE 4 MG/.1ML
SPRAY NASAL
Qty: 1 EACH | Refills: 0 | Status: SHIPPED | OUTPATIENT
Start: 2024-01-16

## 2024-01-16 RX ORDER — IPRATROPIUM BROMIDE AND ALBUTEROL SULFATE 2.5; .5 MG/3ML; MG/3ML
3 SOLUTION RESPIRATORY (INHALATION) ONCE
Status: CANCELLED | OUTPATIENT
Start: 2024-01-16 | End: 2024-01-16

## 2024-01-16 RX ORDER — TRAMADOL HYDROCHLORIDE 50 MG/1
25-50 TABLET ORAL 2 TIMES DAILY PRN
Qty: 60 TABLET | Refills: 0 | Status: SHIPPED | OUTPATIENT
Start: 2024-01-16 | End: 2024-02-15

## 2024-01-16 RX ORDER — GUAIFENESIN AND DEXTROMETHORPHAN HYDROBROMIDE 600; 30 MG/1; MG/1
1 TABLET, EXTENDED RELEASE ORAL 3 TIMES DAILY PRN
Qty: 30 TABLET | Refills: 0 | Status: SHIPPED | OUTPATIENT
Start: 2024-01-16

## 2024-01-16 RX ORDER — IPRATROPIUM BROMIDE AND ALBUTEROL SULFATE 2.5; .5 MG/3ML; MG/3ML
3 SOLUTION RESPIRATORY (INHALATION) ONCE
Status: COMPLETED | OUTPATIENT
Start: 2024-01-16 | End: 2024-01-16

## 2024-01-16 RX ORDER — IPRATROPIUM BROMIDE AND ALBUTEROL SULFATE 2.5; .5 MG/3ML; MG/3ML
3 SOLUTION RESPIRATORY (INHALATION) ONCE
Status: DISCONTINUED | OUTPATIENT
Start: 2024-01-16 | End: 2024-01-16

## 2024-01-16 RX ORDER — IPRATROPIUM BROMIDE AND ALBUTEROL SULFATE 2.5; .5 MG/3ML; MG/3ML
3 SOLUTION RESPIRATORY (INHALATION) EVERY 6 HOURS PRN
Qty: 30 EACH | Refills: 0 | Status: SHIPPED | OUTPATIENT
Start: 2024-01-16

## 2024-01-16 RX ADMIN — IPRATROPIUM BROMIDE AND ALBUTEROL SULFATE 3 ML: 2.5; .5 SOLUTION RESPIRATORY (INHALATION) at 09:52

## 2024-01-16 ASSESSMENT — PATIENT HEALTH QUESTIONNAIRE - PHQ9: CLINICAL INTERPRETATION OF PHQ2 SCORE: 0

## 2024-01-16 NOTE — PROGRESS NOTES
Chief Complaint   Patient presents with    Cough     Clear mucus, coughing spells can barely catch her breath, wheezing , headache since 12/25/23        HPI: Patient is a 89 y.o. female complaining of more than 15  days of illness including: productive cough, nasal congestion, wheezing.   Mucus is: green.  Similarly ill exposures: no.  Treatments tried: she was seen in urgent care on 01/02 and was prescribed antibiotics/doxycycline , has also tried OTC mucinex.patient reports no improvement of symptoms and continues to have coughing and intermittent wheezing . Denies fever, chills, SOB at rest, chest pain .   Positive for nasal congestion, sinus pressure, headache.   Patient reports previous episodes of bronchitis as well as history of hospitalization for pneumonia in past.     She  reports that she has never smoked. She has never used smokeless tobacco..   No history of asthma       Patient also reports chronic back and knee pain. She is getting knee steroid injection next month with orthopedics. Requesting for refill of tramadol for chronic pain, patient has been taking tramadol for many years for pain.      ROS:  No fever, nausea, changes in bowel movements or skin rash.      I reviewed the patient's medications, allergies and medical history:  Current Outpatient Medications   Medication Sig Dispense Refill    Dextromethorphan-guaiFENesin (MUCINEX DM)  MG TABLET SR 12 HR Take 1 Each by mouth 3 times a day as needed (cough). 30 Tablet 0    amoxicillin-clavulanate (AUGMENTIN) 875-125 MG Tab Take 1 Tablet by mouth 2 times a day for 7 days. 14 Tablet 0    methylPREDNISolone (MEDROL DOSEPAK) 4 MG Tablet Therapy Pack 6 pills day 1, 5 pills day 2, 4 pills day 3, 3 pills day 4, 2 pills day 5, 1 pill day 6. 21 Tablet 0    traMADol (ULTRAM) 50 MG Tab Take 0.5-1 Tablets by mouth 2 times a day as needed for Severe Pain for up to 30 days. 60 Tablet 0    ipratropium-albuterol (DUONEB) 0.5-2.5 (3) MG/3ML nebulizer  solution Take 3 mL by nebulization every 6 hours as needed for Shortness of Breath (wheezing). 30 Each 0    Naloxone (NARCAN) 4 MG/0.1ML Liquid One spray in one nostril for overdose and call 911. 1 Each 0    fluorouracil (EFUDEX) 5 % cream APPLY TOPICALLY TO THE AFFECTED AREA OF FACE TWICE DAILY IN THE MORNING AND IN THE EVENING FOR 4 TO 6 WEEKS      lansoprazole (PREVACID) 30 MG CAPSULE DELAYED RELEASE TAKE 1 CAPSULE BY MOUTH EVERY DAY 90 Capsule 0    albuterol 108 (90 Base) MCG/ACT Aero Soln inhalation aerosol Inhale 2 Puffs every four hours as needed for Shortness of Breath. 1 Each 1    fluticasone (FLONASE) 50 MCG/ACT nasal spray Administer 1 Spray into affected nostril(S) every day.      cetirizine (ZYRTEC) 10 MG Tab Take 10 mg by mouth every day.      celecoxib (CELEBREX) 200 MG Cap Take 200 mg by mouth every morning.      clopidogrel (PLAVIX) 75 MG Tab       metoprolol SR (TOPROL XL) 50 MG TABLET SR 24 HR Take 50 mg by mouth every day.      atorvastatin (LIPITOR) 80 MG tablet Take 80 mg by mouth every evening.       No current facility-administered medications for this visit.     Patient has no known allergies.  Past Medical History:   Diagnosis Date    History of heart artery stent     History of heart attack     Hyperlipidemia     Hypertension     Osteoarthritis     (B)        EXAM:  /72 (BP Location: Left arm, Patient Position: Sitting, BP Cuff Size: Adult)   Pulse 76   Temp 36.3 °C (97.4 °F) (Temporal)   Ht 1.524 m (5')   Wt 69 kg (152 lb 3.2 oz)   SpO2 97%   General: Alert, no conversational dyspnea or audible wheeze, non-toxic appearance.  Eyes: PERRL, conjunctiva slightly injected, no eye discharge.  Ears: Normal pinnae,TM's normal bilaterally.  Nares: Patent with thin mucus.  Sinuses: tender over maxillary / frontal sinuses.  Throat: Erythematous injection without exudate.   Neck: Supple, with no adenopathy.  Lungs: Clear to auscultation bilaterally, no wheeze, crackles or rhonchi.   Heart:  Regular rate without murmur.  Skin: Warm and dry without rash.     ASSESSMENT:   1. Bronchitis with acute wheezing    2. Subacute frontal sinusitis    3. Chronic bilateral low back pain without sciatica    4. Chronic pain of both knees            1. Bronchitis with acute wheezing  Uncontrolled   Symptoms persistent for more than 2 weeks   Patient is breathing good on room air with Spo2 > 92%  Mild wheezing on exam, no sings of consolidation /ronchi/rales.  C-Xray done 2 weeks back was normal   Plan:  Symptomatic treatment with albuterol inhaler Q6 hr prn   Nebulizer treatment given in office , patient reports feeling litle better. Will prescribed nebulizer as well q6 prn.  Discussed risk and benefits of steroids and antibiotics. Patient verbalized understanding   - DME Nebulizer Supplies  - Dextromethorphan-guaiFENesin (MUCINEX DM)  MG TABLET SR 12 HR; Take 1 Each by mouth 3 times a day as needed (cough).  Dispense: 30 Tablet; Refill: 0  - amoxicillin-clavulanate (AUGMENTIN) 875-125 MG Tab; Take 1 Tablet by mouth 2 times a day for 7 days.  Dispense: 14 Tablet; Refill: 0  - methylPREDNISolone (MEDROL DOSEPAK) 4 MG Tablet Therapy Pack; 6 pills day 1, 5 pills day 2, 4 pills day 3, 3 pills day 4, 2 pills day 5, 1 pill day 6.  Dispense: 21 Tablet; Refill: 0  - ipratropium-albuterol (DUONEB) nebulizer solution  - ipratropium-albuterol (DUONEB) 0.5-2.5 (3) MG/3ML nebulizer solution; Take 3 mL by nebulization every 6 hours as needed for Shortness of Breath (wheezing).  Dispense: 30 Each; Refill: 0        2. Subacute frontal sinusitis  Subacute, uncontrolled   Will treat for sinus infection with antibiotics   Plan:  - Dextromethorphan-guaiFENesin (MUCINEX DM)  MG TABLET SR 12 HR; Take 1 Each by mouth 3 times a day as needed (cough).  Dispense: 30 Tablet; Refill: 0  - amoxicillin-clavulanate (AUGMENTIN) 875-125 MG Tab; Take 1 Tablet by mouth 2 times a day for 7 days.  Dispense: 14 Tablet; Refill: 0    1.  As  "symptoms have been worsening over the last week, will treat with antibiotics.  2. Twice daily use of nasal saline rinse or Neti-Pot.  3. OTC anti-pyretics and decongestants as needed.  4. Follow-up in office or urgent care for worsening symptoms, difficulty breathing, lack of expected recovery, or should new symptoms or problems arise.      3. Chronic bilateral low back pain without sciatica  4. Chronic pain of both knees    Chronic, stable   Plan:  Controlled substance use informed consent and controlled substance treatment agreement  onfile  Patient takes tramadol 25-50 mg BID prn   PDMP shows no abnormal prescribing or filling behavior.    Controlled substance agreement up-to-date.      - refilled, risk benefits and side effects of this medication discussed   - Will get UDS as indicated  -Controlled substance discussed with client. Client agrees to abide by controlled substance contract.   -A Risk of Abuse assessment for controlled abuse was previously preformed and documented in the past medical history.   The treatment plan was reviewed and discussed with the patient. The pharmacy monitoring report was requested and reviewed.  Patient is appropriate for refill of this medication.  -Patient informed no medication adjustments will be made to titrate up or add additional narcotics, benzodiazepines or other controlled substances to this regimen.  Referral to pain management or behavioral health will be made as appropriate.     Patient understands this prescription is a controlled substance which is potentially habit-forming and its use is regulated by the [place]. We also discussed the new \"black box\" warning regarding the lethal combination of opioids and benzodiazepines. Refills are subject to terms of a controlled substance agreement and patient has an updated one on file. Most recent UDS is appropriate. Any refill requires an office visit. Narcotics, benzodiazepines, stimulants, and sleep aids may have adverse " effects and the risks of addiction, accidental overdose and death were emphasized. Provided prescriptions for the next three months.     - patient on long term controlled substance for pain. Discussed with patient .referral to pain management for further management     - traMADol (ULTRAM) 50 MG Tab; Take 0.5-1 Tablets by mouth 2 times a day as needed for Severe Pain for up to 30 days.  Dispense: 60 Tablet; Refill: 0  - Naloxone (NARCAN) 4 MG/0.1ML Liquid; One spray in one nostril for overdose and call 911.  Dispense: 1 Each; Refill: 0      Strict ER precautions discussed with patient for worsening of symptoms or SOB, chest pain.     Follow up as needed or in  3 months     Please note that this dictation was created using voice recognition software. I have made every reasonable attempt to correct obvious errors, but I expect that there are errors of grammar and possibly content that I did not discover before finalizing the note.

## 2024-01-22 ENCOUNTER — APPOINTMENT (OUTPATIENT)
Dept: RADIOLOGY | Facility: MEDICAL CENTER | Age: 89
End: 2024-01-22
Attending: EMERGENCY MEDICINE
Payer: MEDICARE

## 2024-01-22 ENCOUNTER — APPOINTMENT (OUTPATIENT)
Dept: RADIOLOGY | Facility: MEDICAL CENTER | Age: 89
End: 2024-01-22
Attending: HOSPITALIST
Payer: MEDICARE

## 2024-01-22 ENCOUNTER — HOSPITAL ENCOUNTER (OUTPATIENT)
Facility: MEDICAL CENTER | Age: 89
End: 2024-01-23
Attending: EMERGENCY MEDICINE | Admitting: HOSPITALIST
Payer: MEDICARE

## 2024-01-22 DIAGNOSIS — I25.83 CORONARY ARTERY DISEASE DUE TO LIPID RICH PLAQUE: ICD-10-CM

## 2024-01-22 DIAGNOSIS — I25.10 CORONARY ARTERY DISEASE DUE TO LIPID RICH PLAQUE: ICD-10-CM

## 2024-01-22 DIAGNOSIS — R55 SYNCOPE, UNSPECIFIED SYNCOPE TYPE: ICD-10-CM

## 2024-01-22 LAB
ALBUMIN SERPL BCP-MCNC: 3.6 G/DL (ref 3.2–4.9)
ALBUMIN/GLOB SERPL: 1.2 G/DL
ALP SERPL-CCNC: 101 U/L (ref 30–99)
ALT SERPL-CCNC: 29 U/L (ref 2–50)
ANION GAP SERPL CALC-SCNC: 13 MMOL/L (ref 7–16)
APPEARANCE UR: CLEAR
AST SERPL-CCNC: 31 U/L (ref 12–45)
BASOPHILS # BLD AUTO: 0.5 % (ref 0–1.8)
BASOPHILS # BLD: 0.05 K/UL (ref 0–0.12)
BILIRUB SERPL-MCNC: 0.3 MG/DL (ref 0.1–1.5)
BILIRUB UR QL STRIP.AUTO: NEGATIVE
BUN SERPL-MCNC: 34 MG/DL (ref 8–22)
CALCIUM ALBUM COR SERPL-MCNC: 8.2 MG/DL (ref 8.5–10.5)
CALCIUM SERPL-MCNC: 7.9 MG/DL (ref 8.5–10.5)
CHLORIDE SERPL-SCNC: 104 MMOL/L (ref 96–112)
CO2 SERPL-SCNC: 22 MMOL/L (ref 20–33)
COLOR UR: YELLOW
CREAT SERPL-MCNC: 0.82 MG/DL (ref 0.5–1.4)
EKG IMPRESSION: NORMAL
EOSINOPHIL # BLD AUTO: 0.12 K/UL (ref 0–0.51)
EOSINOPHIL NFR BLD: 1.1 % (ref 0–6.9)
ERYTHROCYTE [DISTWIDTH] IN BLOOD BY AUTOMATED COUNT: 58.5 FL (ref 35.9–50)
GFR SERPLBLD CREATININE-BSD FMLA CKD-EPI: 65 ML/MIN/1.73 M 2
GLOBULIN SER CALC-MCNC: 2.9 G/DL (ref 1.9–3.5)
GLUCOSE SERPL-MCNC: 126 MG/DL (ref 65–99)
GLUCOSE UR STRIP.AUTO-MCNC: NEGATIVE MG/DL
HCT VFR BLD AUTO: 39.3 % (ref 37–47)
HGB BLD-MCNC: 13.1 G/DL (ref 12–16)
IMM GRANULOCYTES # BLD AUTO: 0.12 K/UL (ref 0–0.11)
IMM GRANULOCYTES NFR BLD AUTO: 1.1 % (ref 0–0.9)
KETONES UR STRIP.AUTO-MCNC: NEGATIVE MG/DL
LEUKOCYTE ESTERASE UR QL STRIP.AUTO: NEGATIVE
LYMPHOCYTES # BLD AUTO: 2.99 K/UL (ref 1–4.8)
LYMPHOCYTES NFR BLD: 27.6 % (ref 22–41)
MCH RBC QN AUTO: 32.1 PG (ref 27–33)
MCHC RBC AUTO-ENTMCNC: 33.3 G/DL (ref 32.2–35.5)
MCV RBC AUTO: 96.3 FL (ref 81.4–97.8)
MICRO URNS: NORMAL
MONOCYTES # BLD AUTO: 1.05 K/UL (ref 0–0.85)
MONOCYTES NFR BLD AUTO: 9.7 % (ref 0–13.4)
NEUTROPHILS # BLD AUTO: 6.52 K/UL (ref 1.82–7.42)
NEUTROPHILS NFR BLD: 60 % (ref 44–72)
NITRITE UR QL STRIP.AUTO: NEGATIVE
NRBC # BLD AUTO: 0 K/UL
NRBC BLD-RTO: 0 /100 WBC (ref 0–0.2)
NT-PROBNP SERPL IA-MCNC: 812 PG/ML (ref 0–125)
PH UR STRIP.AUTO: 5.5 [PH] (ref 5–8)
PLATELET # BLD AUTO: 318 K/UL (ref 164–446)
PMV BLD AUTO: 9.6 FL (ref 9–12.9)
POTASSIUM SERPL-SCNC: 4.1 MMOL/L (ref 3.6–5.5)
PROT SERPL-MCNC: 6.5 G/DL (ref 6–8.2)
PROT UR QL STRIP: NEGATIVE MG/DL
RBC # BLD AUTO: 4.08 M/UL (ref 4.2–5.4)
RBC UR QL AUTO: NEGATIVE
SODIUM SERPL-SCNC: 139 MMOL/L (ref 135–145)
SP GR UR STRIP.AUTO: 1.02
TROPONIN T SERPL-MCNC: 16 NG/L (ref 6–19)
TROPONIN T SERPL-MCNC: 20 NG/L (ref 6–19)
UROBILINOGEN UR STRIP.AUTO-MCNC: 0.2 MG/DL
WBC # BLD AUTO: 10.9 K/UL (ref 4.8–10.8)

## 2024-01-22 PROCEDURE — 36415 COLL VENOUS BLD VENIPUNCTURE: CPT

## 2024-01-22 PROCEDURE — 93005 ELECTROCARDIOGRAM TRACING: CPT | Performed by: EMERGENCY MEDICINE

## 2024-01-22 PROCEDURE — 85025 COMPLETE CBC W/AUTO DIFF WBC: CPT

## 2024-01-22 PROCEDURE — 71045 X-RAY EXAM CHEST 1 VIEW: CPT

## 2024-01-22 PROCEDURE — 72220 X-RAY EXAM SACRUM TAILBONE: CPT

## 2024-01-22 PROCEDURE — 81003 URINALYSIS AUTO W/O SCOPE: CPT

## 2024-01-22 PROCEDURE — A9270 NON-COVERED ITEM OR SERVICE: HCPCS | Performed by: HOSPITALIST

## 2024-01-22 PROCEDURE — 83880 ASSAY OF NATRIURETIC PEPTIDE: CPT

## 2024-01-22 PROCEDURE — 94760 N-INVAS EAR/PLS OXIMETRY 1: CPT

## 2024-01-22 PROCEDURE — 700105 HCHG RX REV CODE 258: Performed by: HOSPITALIST

## 2024-01-22 PROCEDURE — 80053 COMPREHEN METABOLIC PANEL: CPT

## 2024-01-22 PROCEDURE — 99285 EMERGENCY DEPT VISIT HI MDM: CPT

## 2024-01-22 PROCEDURE — 93005 ELECTROCARDIOGRAM TRACING: CPT

## 2024-01-22 PROCEDURE — 700105 HCHG RX REV CODE 258: Performed by: EMERGENCY MEDICINE

## 2024-01-22 PROCEDURE — 70450 CT HEAD/BRAIN W/O DYE: CPT

## 2024-01-22 PROCEDURE — A9270 NON-COVERED ITEM OR SERVICE: HCPCS | Performed by: EMERGENCY MEDICINE

## 2024-01-22 PROCEDURE — 99223 1ST HOSP IP/OBS HIGH 75: CPT | Performed by: HOSPITALIST

## 2024-01-22 PROCEDURE — 700102 HCHG RX REV CODE 250 W/ 637 OVERRIDE(OP): Performed by: EMERGENCY MEDICINE

## 2024-01-22 PROCEDURE — G0378 HOSPITAL OBSERVATION PER HR: HCPCS

## 2024-01-22 PROCEDURE — 700102 HCHG RX REV CODE 250 W/ 637 OVERRIDE(OP): Performed by: HOSPITALIST

## 2024-01-22 PROCEDURE — 84484 ASSAY OF TROPONIN QUANT: CPT

## 2024-01-22 RX ORDER — METOPROLOL SUCCINATE 50 MG/1
50 TABLET, EXTENDED RELEASE ORAL
Status: DISCONTINUED | OUTPATIENT
Start: 2024-01-22 | End: 2024-01-23 | Stop reason: HOSPADM

## 2024-01-22 RX ORDER — ONDANSETRON 4 MG/1
4 TABLET, ORALLY DISINTEGRATING ORAL EVERY 4 HOURS PRN
Status: DISCONTINUED | OUTPATIENT
Start: 2024-01-22 | End: 2024-01-23 | Stop reason: HOSPADM

## 2024-01-22 RX ORDER — CELECOXIB 100 MG/1
200 CAPSULE ORAL EVERY MORNING
Status: DISCONTINUED | OUTPATIENT
Start: 2024-01-23 | End: 2024-01-23 | Stop reason: HOSPADM

## 2024-01-22 RX ORDER — ACETAMINOPHEN 325 MG/1
650 TABLET ORAL EVERY 6 HOURS PRN
Status: DISCONTINUED | OUTPATIENT
Start: 2024-01-22 | End: 2024-01-23 | Stop reason: HOSPADM

## 2024-01-22 RX ORDER — BISACODYL 10 MG
10 SUPPOSITORY, RECTAL RECTAL
Status: DISCONTINUED | OUTPATIENT
Start: 2024-01-22 | End: 2024-01-23 | Stop reason: HOSPADM

## 2024-01-22 RX ORDER — ATORVASTATIN CALCIUM 80 MG/1
80 TABLET, FILM COATED ORAL NIGHTLY
Status: DISCONTINUED | OUTPATIENT
Start: 2024-01-22 | End: 2024-01-23 | Stop reason: HOSPADM

## 2024-01-22 RX ORDER — ONDANSETRON 2 MG/ML
4 INJECTION INTRAMUSCULAR; INTRAVENOUS EVERY 4 HOURS PRN
Status: DISCONTINUED | OUTPATIENT
Start: 2024-01-22 | End: 2024-01-23 | Stop reason: HOSPADM

## 2024-01-22 RX ORDER — LABETALOL HYDROCHLORIDE 5 MG/ML
10 INJECTION, SOLUTION INTRAVENOUS EVERY 4 HOURS PRN
Status: DISCONTINUED | OUTPATIENT
Start: 2024-01-22 | End: 2024-01-23 | Stop reason: HOSPADM

## 2024-01-22 RX ORDER — POLYETHYLENE GLYCOL 3350 17 G/17G
1 POWDER, FOR SOLUTION ORAL
Status: DISCONTINUED | OUTPATIENT
Start: 2024-01-22 | End: 2024-01-23 | Stop reason: HOSPADM

## 2024-01-22 RX ORDER — OMEPRAZOLE 20 MG/1
20 CAPSULE, DELAYED RELEASE ORAL
Status: DISCONTINUED | OUTPATIENT
Start: 2024-01-22 | End: 2024-01-23 | Stop reason: HOSPADM

## 2024-01-22 RX ORDER — ALBUTEROL SULFATE 90 UG/1
2 AEROSOL, METERED RESPIRATORY (INHALATION) EVERY 4 HOURS PRN
Status: DISCONTINUED | OUTPATIENT
Start: 2024-01-22 | End: 2024-01-23 | Stop reason: HOSPADM

## 2024-01-22 RX ORDER — CLOPIDOGREL BISULFATE 75 MG/1
75 TABLET ORAL DAILY
Status: DISCONTINUED | OUTPATIENT
Start: 2024-01-23 | End: 2024-01-23 | Stop reason: HOSPADM

## 2024-01-22 RX ORDER — ACETAMINOPHEN 500 MG
1000 TABLET ORAL ONCE
Status: COMPLETED | OUTPATIENT
Start: 2024-01-22 | End: 2024-01-22

## 2024-01-22 RX ORDER — SODIUM CHLORIDE 9 MG/ML
INJECTION, SOLUTION INTRAVENOUS CONTINUOUS
Status: DISCONTINUED | OUTPATIENT
Start: 2024-01-22 | End: 2024-01-23

## 2024-01-22 RX ORDER — SODIUM CHLORIDE 9 MG/ML
500 INJECTION, SOLUTION INTRAVENOUS ONCE
Status: COMPLETED | OUTPATIENT
Start: 2024-01-22 | End: 2024-01-22

## 2024-01-22 RX ORDER — AMOXICILLIN 250 MG
2 CAPSULE ORAL 2 TIMES DAILY
Status: DISCONTINUED | OUTPATIENT
Start: 2024-01-22 | End: 2024-01-23 | Stop reason: HOSPADM

## 2024-01-22 RX ORDER — TRAMADOL HYDROCHLORIDE 50 MG/1
25-50 TABLET ORAL 2 TIMES DAILY PRN
Status: DISCONTINUED | OUTPATIENT
Start: 2024-01-22 | End: 2024-01-23 | Stop reason: HOSPADM

## 2024-01-22 RX ADMIN — ACETAMINOPHEN 1000 MG: 500 TABLET ORAL at 20:16

## 2024-01-22 RX ADMIN — SODIUM CHLORIDE 500 ML: 9 INJECTION, SOLUTION INTRAVENOUS at 20:20

## 2024-01-22 RX ADMIN — SODIUM CHLORIDE: 9 INJECTION, SOLUTION INTRAVENOUS at 22:08

## 2024-01-22 RX ADMIN — DOCUSATE SODIUM 50 MG AND SENNOSIDES 8.6 MG 2 TABLET: 8.6; 5 TABLET, FILM COATED ORAL at 22:08

## 2024-01-22 ASSESSMENT — VISUAL ACUITY: OU: 1

## 2024-01-23 ENCOUNTER — APPOINTMENT (OUTPATIENT)
Dept: CARDIOLOGY | Facility: MEDICAL CENTER | Age: 89
End: 2024-01-23
Attending: HOSPITALIST
Payer: MEDICARE

## 2024-01-23 VITALS
OXYGEN SATURATION: 95 % | HEART RATE: 75 BPM | HEIGHT: 62 IN | DIASTOLIC BLOOD PRESSURE: 76 MMHG | WEIGHT: 152.34 LBS | RESPIRATION RATE: 18 BRPM | SYSTOLIC BLOOD PRESSURE: 176 MMHG | TEMPERATURE: 97.5 F | BODY MASS INDEX: 28.03 KG/M2

## 2024-01-23 LAB
ANION GAP SERPL CALC-SCNC: 13 MMOL/L (ref 7–16)
BUN SERPL-MCNC: 23 MG/DL (ref 8–22)
CALCIUM SERPL-MCNC: 8 MG/DL (ref 8.5–10.5)
CHLORIDE SERPL-SCNC: 104 MMOL/L (ref 96–112)
CO2 SERPL-SCNC: 22 MMOL/L (ref 20–33)
CREAT SERPL-MCNC: 0.62 MG/DL (ref 0.5–1.4)
ERYTHROCYTE [DISTWIDTH] IN BLOOD BY AUTOMATED COUNT: 58.2 FL (ref 35.9–50)
GFR SERPLBLD CREATININE-BSD FMLA CKD-EPI: 81 ML/MIN/1.73 M 2
GLUCOSE SERPL-MCNC: 97 MG/DL (ref 65–99)
HCT VFR BLD AUTO: 40.4 % (ref 37–47)
HGB BLD-MCNC: 13.3 G/DL (ref 12–16)
LV EJECT FRACT  99904: 72
LV EJECT FRACT MOD 2C 99903: 72.98
LV EJECT FRACT MOD 4C 99902: 68.37
LV EJECT FRACT MOD BP 99901: 71.74
MCH RBC QN AUTO: 31.4 PG (ref 27–33)
MCHC RBC AUTO-ENTMCNC: 32.9 G/DL (ref 32.2–35.5)
MCV RBC AUTO: 95.3 FL (ref 81.4–97.8)
PLATELET # BLD AUTO: 324 K/UL (ref 164–446)
PMV BLD AUTO: 9.4 FL (ref 9–12.9)
POTASSIUM SERPL-SCNC: 3.9 MMOL/L (ref 3.6–5.5)
RBC # BLD AUTO: 4.24 M/UL (ref 4.2–5.4)
SODIUM SERPL-SCNC: 139 MMOL/L (ref 135–145)
TROPONIN T SERPL-MCNC: 18 NG/L (ref 6–19)
WBC # BLD AUTO: 11.2 K/UL (ref 4.8–10.8)

## 2024-01-23 PROCEDURE — G0378 HOSPITAL OBSERVATION PER HR: HCPCS

## 2024-01-23 PROCEDURE — 87040 BLOOD CULTURE FOR BACTERIA: CPT | Mod: 91

## 2024-01-23 PROCEDURE — 85027 COMPLETE CBC AUTOMATED: CPT

## 2024-01-23 PROCEDURE — 84484 ASSAY OF TROPONIN QUANT: CPT

## 2024-01-23 PROCEDURE — 97162 PT EVAL MOD COMPLEX 30 MIN: CPT

## 2024-01-23 PROCEDURE — 80048 BASIC METABOLIC PNL TOTAL CA: CPT

## 2024-01-23 PROCEDURE — 700102 HCHG RX REV CODE 250 W/ 637 OVERRIDE(OP): Performed by: HOSPITALIST

## 2024-01-23 PROCEDURE — 97535 SELF CARE MNGMENT TRAINING: CPT

## 2024-01-23 PROCEDURE — 93306 TTE W/DOPPLER COMPLETE: CPT | Mod: 26 | Performed by: INTERNAL MEDICINE

## 2024-01-23 PROCEDURE — 97165 OT EVAL LOW COMPLEX 30 MIN: CPT

## 2024-01-23 PROCEDURE — 93306 TTE W/DOPPLER COMPLETE: CPT

## 2024-01-23 PROCEDURE — 99239 HOSP IP/OBS DSCHRG MGMT >30: CPT | Performed by: HOSPITALIST

## 2024-01-23 PROCEDURE — 96374 THER/PROPH/DIAG INJ IV PUSH: CPT | Mod: XU

## 2024-01-23 PROCEDURE — 700111 HCHG RX REV CODE 636 W/ 250 OVERRIDE (IP): Mod: JG | Performed by: HOSPITALIST

## 2024-01-23 PROCEDURE — A9270 NON-COVERED ITEM OR SERVICE: HCPCS | Performed by: HOSPITALIST

## 2024-01-23 RX ADMIN — CLOPIDOGREL BISULFATE 75 MG: 75 TABLET ORAL at 06:24

## 2024-01-23 RX ADMIN — METOPROLOL SUCCINATE 50 MG: 50 TABLET, EXTENDED RELEASE ORAL at 02:02

## 2024-01-23 RX ADMIN — LABETALOL HYDROCHLORIDE 10 MG: 5 INJECTION, SOLUTION INTRAVENOUS at 04:39

## 2024-01-23 RX ADMIN — CELECOXIB 200 MG: 100 CAPSULE ORAL at 06:24

## 2024-01-23 ASSESSMENT — GAIT ASSESSMENTS
GAIT LEVEL OF ASSIST: SUPERVISED
DEVIATION: BRADYKINETIC
DISTANCE (FEET): 100
ASSISTIVE DEVICE: SINGLE POINT CANE

## 2024-01-23 ASSESSMENT — FIBROSIS 4 INDEX: FIB4 SCORE: 1.74

## 2024-01-23 ASSESSMENT — COGNITIVE AND FUNCTIONAL STATUS - GENERAL
DAILY ACTIVITIY SCORE: 24
SUGGESTED CMS G CODE MODIFIER DAILY ACTIVITY: CH
MOBILITY SCORE: 23
SUGGESTED CMS G CODE MODIFIER MOBILITY: CI
CLIMB 3 TO 5 STEPS WITH RAILING: A LITTLE

## 2024-01-23 ASSESSMENT — LIFESTYLE VARIABLES
EVER FELT BAD OR GUILTY ABOUT YOUR DRINKING: NO
CONSUMPTION TOTAL: NEGATIVE
HAVE PEOPLE ANNOYED YOU BY CRITICIZING YOUR DRINKING: NO
HOW MANY TIMES IN THE PAST YEAR HAVE YOU HAD 5 OR MORE DRINKS IN A DAY: 0
TOTAL SCORE: 0
ON A TYPICAL DAY WHEN YOU DRINK ALCOHOL HOW MANY DRINKS DO YOU HAVE: 0
AVERAGE NUMBER OF DAYS PER WEEK YOU HAVE A DRINK CONTAINING ALCOHOL: 0
ALCOHOL_USE: NO
EVER HAD A DRINK FIRST THING IN THE MORNING TO STEADY YOUR NERVES TO GET RID OF A HANGOVER: NO
TOTAL SCORE: 0
TOTAL SCORE: 0
HAVE YOU EVER FELT YOU SHOULD CUT DOWN ON YOUR DRINKING: NO

## 2024-01-23 ASSESSMENT — ACTIVITIES OF DAILY LIVING (ADL): TOILETING: INDEPENDENT

## 2024-01-23 ASSESSMENT — PATIENT HEALTH QUESTIONNAIRE - PHQ9
SUM OF ALL RESPONSES TO PHQ9 QUESTIONS 1 AND 2: 0
2. FEELING DOWN, DEPRESSED, IRRITABLE, OR HOPELESS: NOT AT ALL
1. LITTLE INTEREST OR PLEASURE IN DOING THINGS: NOT AT ALL

## 2024-01-23 NOTE — PROGRESS NOTES
Discharge orders received.  Patient arrived to the discharge lounge.  PIV removed. No home medications per chart. Instructions given, medications reviewed and general discharge education provided to patient.  Follow up appointments discussed.  Patient verbalized understanding of dc instructions and prescriptions.  Patient signed discharge instructions.  Patient verbalized she had all belongings with her.  Patient left via car to home with her daughter.  Wished patient a speedy recovery.

## 2024-01-23 NOTE — PROGRESS NOTES
4 Eyes Skin Assessment Completed by ERIKA Paulson and ERIKA Linder.    Head Bruising and Scratch, open tear on right cheek  Ears Blanching  Nose Blanching  Mouth WDL  Neck WDL  Breast/Chest WDL  Shoulder Blades WDL  Spine WDL  (R) Arm/Elbow/Hand Bruising and Swelling  (L) Arm/Elbow/Hand Bruising and Swelling  Abdomen WDL  Groin WDL  Scrotum/Coccyx/Buttocks Redness and Blanching, encourage patient to turn self, q2hour turns in place  (R) Leg Redness, Bruising, and Swelling  (L) Leg Redness, Bruising, and Swelling  (R) Heel/Foot/Toe Bruising  (L) Heel/Foot/Toe Bruising          Devices In Places Tele Box and Pulse Ox      Interventions In Place Pillows, Q2 Turns, and Barrier Cream    Possible Skin Injury No    Pictures Uploaded Into Epic N/A  Wound Consult Placed N/A  RN Wound Prevention Protocol Ordered No

## 2024-01-23 NOTE — ED NOTES
BS report from Wendi  Pt moved to [place] [id number] w/ family at BS  Pt pending admit at this time  Pt is not on oxygen and no isolation, pt is A&O x4

## 2024-01-23 NOTE — THERAPY
"Physical Therapy   Initial Evaluation     Patient Name: Chantelle Sánchez  Age:  89 y.o., Sex:  female  Medical Record #: [id number]  Today's Date: 1/23/2024     Precautions  Precautions: Fall Risk    Assessment  Chantelle Sánchez is a 89 y.o. female who presented 1/22/2024 with syncope.  Patient was apparently in her normal state of health at home sitting on her sofa, she suddenly passed out family did see it.  Patient presents to PT eval at her baseline function. She lives with her daughter and uses a SPC at baseline and is indep with ADLs. She was able to demo mobility with sup today and will have no further acute care PT needs.     Plan    Physical Therapy Initial Treatment Plan   Duration: Evaluation only    DC Equipment Recommendations: None  Discharge Recommendations: Anticipate that the patient will have no further physical therapy needs after discharge from the hospital       Subjective    \"I just want to go home\"     Objective       01/23/24 1000   Precautions   Precautions Fall Risk   Pain 0 - 10 Group   Therapist Pain Assessment 0;Post Activity Pain Same as Prior to Activity   Prior Living Situation   Prior Services Intermittent Physical Support for ADL Per Family   Housing / Facility 1 Story House   Steps Into Home 0   Steps In Home 0   Bathroom Set up Walk In Shower;Grab Bars;Shower Chair   Equipment Owned None;Front-Wheel Walker;Single Point Cane;Wheelchair;Tub / Shower Seat   Lives with - Patient's Self Care Capacity Adult Children   Comments Patient lives with daughter and MARY and they provide assist if needed, but she is primarily indep. She no longer drives   Prior Level of Functional Mobility   Bed Mobility Independent   Transfer Status Independent   Ambulation Independent   Ambulation Distance household   Assistive Devices Used Single Point Cane   Comments uses her SPC at all times, indep with ADLs. Has assist for household duties   History of Falls   History of Falls Yes   Date of Last Fall "   (fell 2 weeks ago while trying to sit on the couch and missed)   Cognition    Cognition / Consciousness WDL   Level of Consciousness Alert   Comments pleasant and cooperative   Passive ROM Upper Body   Passive ROM Upper Body WDL   Active ROM Upper Body   Active ROM Upper Body  WDL   Strength Upper Body   Upper Body Strength  WDL   Active ROM Lower Body    Active ROM Lower Body  WDL   Strength Lower Body   Lower Body Strength  WDL   Comments baseline OA in all extremities   Sensation Lower Body   Lower Extremity Sensation   WDL   Vision   Vision Comments macular degeneration   Other Treatments   Other Treatments Provided Educated daughter about use of FWW and WC as needed   Balance Assessment   Sitting Balance (Static) Fair +   Sitting Balance (Dynamic) Fair +   Standing Balance (Static) Fair   Standing Balance (Dynamic) Fair   Weight Shift Sitting Fair   Weight Shift Standing Fair   Comments w/SPC   Bed Mobility    Supine to Sit Supervised   Scooting Supervised   Gait Analysis   Gait Level Of Assist Supervised   Assistive Device Single Point Cane   Distance (Feet) 100   # of Times Distance was Traveled 1   Deviation Bradykinetic   Comments trialed use of FWW. Patient demos improved gait w/SPC compared to FWW   Functional Mobility   Sit to Stand Supervised   Bed, Chair, Wheelchair Transfer Supervised   How much difficulty does the patient currently have...   Turning over in bed (including adjusting bedclothes, sheets and blankets)? 4   Sitting down on and standing up from a chair with arms (e.g., wheelchair, bedside commode, etc.) 4   Moving from lying on back to sitting on the side of the bed? 4   How much help from another person does the patient currently need...   Moving to and from a bed to a chair (including a wheelchair)? 4   Need to walk in a hospital room? 4   Climbing 3-5 steps with a railing? 3   6 clicks Mobility Score 23   Activity Tolerance   Sitting in Chair post session   Sitting Edge of Bed 10 min    Standing 10 min   Edema / Skin Assessment   Comments multiple hematomas from CP cuff squeezing for frequent BPs in the ED   Education Group   Education Provided Role of Physical Therapist;Use of Assistive Device;Gait Training   Role of Physical Therapist Patient Response Patient;Acceptance;Explanation;Demonstration;Verbal Demonstration;Action Demonstration   Gait Training Patient Response Patient;Acceptance;Explanation;Demonstration;Verbal Demonstration;Action Demonstration   Use of Assistive Device Patient Response Patient;Acceptance;Explanation;Demonstration;Verbal Demonstration;Action Demonstration   Physical Therapy Initial Treatment Plan    Duration Evaluation only   Anticipated Discharge Equipment and Recommendations   DC Equipment Recommendations None   Discharge Recommendations Anticipate that the patient will have no further physical therapy needs after discharge from the hospital     Bindu Rodríguez, PT, DPT, GCS

## 2024-01-23 NOTE — PROGRESS NOTES
Report received from Elaine JAMES. Patient arrived to unit via gurney at 0040. Ambulated to bed with one person assist. Assumed pt care. A/Ox4. No complaints of pain at this time. Pt oriented to unit and call light system. Pt educated to call before getting out of bed. Verbalized understanding. POC reviewed and white board updated. Tele box on. Pt recently had a syncopal episode. Safety precautions in place. Call light in reach. Bed locked in lowest position with upper bed rails up. Bed alarm on. Family at bedside.

## 2024-01-23 NOTE — CARE PLAN
The patient is Stable - Low risk of patient condition declining or worsening    Shift Goals  Clinical Goals: monitor BP, fall and safety, q4hour neuro checks, echo in AM  Patient Goals: sleep  Family Goals: sleep, rest and feel better      Problem: Knowledge Deficit - Standard  Goal: Patient and family/care givers will demonstrate understanding of plan of care, disease process/condition, diagnostic tests and medications  Outcome: Progressing   POC discussed with patient and family, questions answered at this time.     Problem: Fall Risk  Goal: Patient will remain free from falls  Outcome: Progressing   Safety and fall precautions in place, non-skid socks applied.

## 2024-01-23 NOTE — ED NOTES
Bedside report received from off going RN: Ophelia, assumed care of patient.  POC discussed with patient. Call light within reach, all needs addressed at this time.       Fall risk interventions in place: Move the patient closer to the nurse's station, Patient's personal possessions are with in their safe reach, Place fall risk sign on patient's door, and Keep floor surfaces clean and dry (all applicable per [place] Fall risk assessment)   Continuous monitoring: Cardiac Leads, Pulse Ox, or Blood Pressure  IVF/IV medications: Not Applicable   Oxygen: Room Air  Bedside sitter: Not Applicable   Isolation: Not Applicable

## 2024-01-23 NOTE — ASSESSMENT & PLAN NOTE
Patient today while sitting on her sofa suddenly rolled her eyes and passed out.  Apparently when she came to she was complaining of a headache.  Patient does not remember the event  Potential vasovagal syncope  Patient did not hit her head  Check orthostatics  Give fluid resuscitation  Obtain echocardiogram  CT of the head  Serial troponins  Telemetry monitoring

## 2024-01-23 NOTE — THERAPY
"Occupational Therapy   Initial Evaluation     Patient Name: Chantelle Sánchez  Age:  89 y.o., Sex:  female  Medical Record #: [id number]  Today's Date: 1/23/2024       Precautions: Fall Risk  Comments: macular degeneration, arthritis    Assessment  Patient is 89 y.o. female who presented 1/22/2024 with syncope.  Pt seen today with her daughter present.  Pt currently able to complete basic ADL's with supervision & likely is functioning at her baseline.  Pt has excellent family support available upon D/C home.  Pt has no further Acute OT needs.      Plan    Occupational Therapy Initial Treatment Plan   Duration: Evaluation only    DC Equipment Recommendations: None  Discharge Recommendations: Anticipate that the patient will have no further occupational therapy needs after discharge from the hospital     Subjective    \"Please don't take my Bp, it bruises & hurts my arms.\"     Objective      Initial Contact Note    Initial Contact Note Order Received and Verified, Evaluation Only - Patient Does Not Require Further Acute Occupational Therapy at this Time.  However, May Benefit from Post Acute Therapy for Higher Level Functional Deficits.   Prior Living Situation   Prior Services Intermittent Physical Support for ADL Per Family   Housing / Facility 1 Story House   Steps Into Home 0   Steps In Home 0   Bathroom Set up Walk In Shower;Grab Bars;Shower Chair   Equipment Owned Front-Wheel Walker;Single Point Cane;Wheelchair;Tub / Shower Seat   Lives with - Patient's Self Care Capacity Adult Children   Comments Pt lives with her daughter & MARY who can assist as needed.  Pt lives in a 55 yr older community.   Prior Level of ADL Function   Self Feeding Independent   Grooming / Hygiene Independent   Bathing Independent   Dressing Independent   Toileting Independent   Prior Level of IADL Function   Medication Management Independent   Laundry Independent   Kitchen Mobility Independent   Finances Requires Assist   Home Management " Requires Assist   Shopping Requires Assist   Prior Level Of Mobility Independent With Device in Community   Occupation (Pre-Hospital Vocational) Retired Due To Age   Precautions   Precautions Fall Risk   Comments macular degeneration, arthritis   Vitals   O2 Delivery Device None - Room Air   Pain   Pain Scales 0 to 10 Scale    Intervention Ambulation / Increased Activity   Pain 0 - 10 Group   Therapist Pain Assessment During Activity;Nurse Notified;0   Cognition    Cognition / Consciousness WDL   Level of Consciousness Alert   Comments pleasant, very co-operative   Passive ROM Upper Body   Passive ROM Upper Body WDL   Active ROM Upper Body   Active ROM Upper Body  WDL   Dominant Hand Right   Comments pt has arthritic changes bilateral hands   Strength Upper Body   Upper Body Strength  WDL   Coordination Upper Body   Coordination WDL   Comments some fine mtor difficulty given her arthritic changes   Balance Assessment   Sitting Balance (Static) Good   Sitting Balance (Dynamic) Fair +   Standing Balance (Static) Fair   Standing Balance (Dynamic) Fair   Weight Shift Sitting Good   Weight Shift Standing Good   Bed Mobility    Supine to Sit Supervised   Sit to Supine   (pt left sitting up in chair)   Scooting Supervised   Rolling Supervised   ADL Assessment   Eating Modified Independent   Grooming Supervision;Standing   Upper Body Dressing Supervision   Lower Body Dressing Supervision   Toileting Supervision   Functional Mobility   Sit to Stand Supervised   Bed, Chair, Wheelchair Transfer Supervised   Toilet Transfers Supervised   Transfer Method Stand Step   Mobility pt amb with s/p cane to bathroom   Activity Tolerance   Sitting in Chair 35   Sitting Edge of Bed 5   Standing 10   Patient / Family Goals   Patient / Family Goal #1 To go home   Education Group   Education Provided Home Safety;Activities of Daily Living   Role of Occupational Therapist Patient Response Patient;Family;Acceptance;Explanation;Verbal  Demonstration   Home Safety Patient Response Patient;Family;Acceptance;Explanation;Demonstration;Verbal Demonstration;Action Demonstration   ADL Patient Response Patient;Acceptance;Explanation;Demonstration;Action Demonstration   Occupational Therapy Initial Treatment Plan    Duration Evaluation only   Anticipated Discharge Equipment and Recommendations   DC Equipment Recommendations None   Discharge Recommendations Anticipate that the patient will have no further occupational therapy needs after discharge from the hospital   Interdisciplinary Plan of Care Collaboration   IDT Collaboration with  Nursing;Physical Therapist   Patient Position at End of Therapy Seated;Call Light within Reach;Tray Table within Reach;Phone within Reach;Family / Friend in Room   Collaboration Comments daughter present throughout tx   Session Information   Date / Session Number  1/23 -eval only

## 2024-01-23 NOTE — ED NOTES
Pt returned from CT and medicated per MAR. Pt provided with medication education and gave verbal consent.

## 2024-01-23 NOTE — HOSPITAL COURSE
Chantelle Sánchez is a 89 y.o. female who presented 1/22/2024 with syncope.  Patient was apparently in her normal state of health at home sitting on her sofa, she suddenly passed out family did see it.  She apparently rolled her eyes into the back of her head and then passed out.  She did not hit her head.  She had no chest pain.  She did have a headache afterwards.

## 2024-01-23 NOTE — ASSESSMENT & PLAN NOTE
Currently in sinus rhythm and on metoprolol doing well  No anticoagulation due to falls  The patient does take Plavix

## 2024-01-23 NOTE — H&P
Hospital Medicine History & Physical Note    Date of Service  1/22/2024    Primary Care Physician  Tali López M.D.    Consultants  None    Specialist Names: None    Code Status  DNAR/DNI    Chief Complaint  Chief Complaint   Patient presents with    Syncope       History of Presenting Illness  Chantelle Sánchez is a 89 y.o. female who presented 1/22/2024 with syncope.  Patient was apparently in her normal state of health at home sitting on her sofa, she suddenly passed out family did see it.  She apparently rolled her eyes into the back of her head and then passed out.  She did not hit her head.  She had no chest pain.  She did have a headache afterwards.  Is possible patient had a vasovagal event.  It is potentially possible she is just dehydrated.  Will check orthostatics.  Will check, monitoring obtain echocardiogram and a CT of the head.  Monitor troponin levels.  PT OT evaluation.  Discussed with family who was at bedside with her.  Patient is DO NOT RESUSCITATE CODE STATUS    I discussed the plan of care with patient, family, bedside RN, and emergency room physician Dr. Jamar Smith .    Review of Systems  Review of Systems   Unable to perform ROS: Acuity of condition       Past Medical History   has a past medical history of History of heart artery stent, History of heart attack, Hyperlipidemia, Hypertension, and Osteoarthritis.    Surgical History   has a past surgical history that includes rhinoplasty; knee arthroscopy; cataract extraction with iol; other orthopedic surgery; and carpal tunnel release.     Family History  family history is not on file.   Family history reviewed with patient. There is no family history that is pertinent to the chief complaint.     Social History   reports that she has never smoked. She has never used smokeless tobacco. She reports that she does not currently use alcohol. She reports that she does not currently use drugs.    Allergies  No Known  Allergies    Medications  Prior to Admission Medications   Prescriptions Last Dose Informant Patient Reported? Taking?   Dextromethorphan-guaiFENesin (MUCINEX DM)  MG TABLET SR 12 HR   No No   Sig: Take 1 Each by mouth 3 times a day as needed (cough).   Naloxone (NARCAN) 4 MG/0.1ML Liquid   No No   Sig: One spray in one nostril for overdose and call 911.   albuterol 108 (90 Base) MCG/ACT Aero Soln inhalation aerosol   No No   Sig: Inhale 2 Puffs every four hours as needed for Shortness of Breath.   amoxicillin-clavulanate (AUGMENTIN) 875-125 MG Tab   No No   Sig: Take 1 Tablet by mouth 2 times a day for 7 days.   atorvastatin (LIPITOR) 80 MG tablet   Yes No   Sig: Take 80 mg by mouth every evening.   celecoxib (CELEBREX) 200 MG Cap   Yes No   Sig: Take 200 mg by mouth every morning.   cetirizine (ZYRTEC) 10 MG Tab   Yes No   Sig: Take 10 mg by mouth every day.   clopidogrel (PLAVIX) 75 MG Tab   Yes No   fluorouracil (EFUDEX) 5 % cream   Yes No   Sig: APPLY TOPICALLY TO THE AFFECTED AREA OF FACE TWICE DAILY IN THE MORNING AND IN THE EVENING FOR 4 TO 6 WEEKS   fluticasone (FLONASE) 50 MCG/ACT nasal spray   Yes No   Sig: Administer 1 Spray into affected nostril(S) every day.   ipratropium-albuterol (DUONEB) 0.5-2.5 (3) MG/3ML nebulizer solution   No No   Sig: Take 3 mL by nebulization every 6 hours as needed for Shortness of Breath (wheezing).   lansoprazole (PREVACID) 30 MG CAPSULE DELAYED RELEASE   No No   Sig: TAKE 1 CAPSULE BY MOUTH EVERY DAY   methylPREDNISolone (MEDROL DOSEPAK) 4 MG Tablet Therapy Pack   No No   Si pills day 1, 5 pills day 2, 4 pills day 3, 3 pills day 4, 2 pills day 5, 1 pill day 6.   metoprolol SR (TOPROL XL) 50 MG TABLET SR 24 HR   Yes No   Sig: Take 50 mg by mouth every day.   traMADol (ULTRAM) 50 MG Tab   No No   Sig: Take 0.5-1 Tablets by mouth 2 times a day as needed for Severe Pain for up to 30 days.      Facility-Administered Medications: None       Physical Exam  Temp:  [36.4  °C (97.6 °F)] 36.4 °C (97.6 °F)  Pulse:  [65-75] 75  Resp:  [17-19] 17  BP: (112-162)/(56-70) 143/65  SpO2:  [93 %-97 %] 93 %  Blood Pressure : (!) 143/65   Temperature: 36.4 °C (97.6 °F)   Pulse: 75   Respiration: 17   Pulse Oximetry: 93 %       Physical Exam  Vitals and nursing note reviewed. Exam conducted with a chaperone present.   Constitutional:       General: She is awake.      Appearance: Normal appearance. She is well-developed, well-groomed and normal weight. She is ill-appearing.   HENT:      Head: Normocephalic and atraumatic.      Jaw: There is normal jaw occlusion. No trismus.      Salivary Glands: Right salivary gland is not tender. Left salivary gland is not tender.      Right Ear: External ear normal.      Left Ear: External ear normal.      Nose: Nose normal.      Mouth/Throat:      Mouth: Mucous membranes are dry.      Pharynx: Oropharynx is clear.   Eyes:      General: Lids are normal. Vision grossly intact.      Extraocular Movements: Extraocular movements intact.      Conjunctiva/sclera: Conjunctivae normal.      Right eye: Right conjunctiva is not injected. No exudate.     Left eye: Left conjunctiva is not injected. No exudate.     Pupils: Pupils are equal, round, and reactive to light.   Neck:      Thyroid: No thyroid mass.      Vascular: No hepatojugular reflux or JVD.      Trachea: No abnormal tracheal secretions or tracheal deviation.   Cardiovascular:      Rate and Rhythm: Normal rate and regular rhythm. Occasional Extrasystoles are present.     Pulses: Normal pulses.      Heart sounds: Normal heart sounds. No murmur heard.     No friction rub.   Pulmonary:      Effort: Pulmonary effort is normal.      Breath sounds: Normal breath sounds. No wheezing or rhonchi.   Abdominal:      General: Abdomen is flat. Bowel sounds are normal.      Palpations: Abdomen is soft.      Tenderness: There is no abdominal tenderness. There is no right CVA tenderness or left CVA tenderness.      Hernia: No  hernia is present.   Musculoskeletal:      Cervical back: Full passive range of motion without pain, normal range of motion and neck supple. No rigidity. No muscular tenderness.      Right lower leg: No edema.      Left lower leg: No edema.   Lymphadenopathy:      Head:      Right side of head: No submental adenopathy.      Left side of head: No submental adenopathy.      Cervical:      Right cervical: No superficial cervical adenopathy.     Left cervical: No superficial cervical adenopathy.      Upper Body:      Right upper body: No supraclavicular adenopathy.      Left upper body: No supraclavicular adenopathy.   Skin:     General: Skin is warm and dry.      Capillary Refill: Capillary refill takes less than 2 seconds.      Coloration: Skin is not cyanotic or pale.      Findings: No abrasion or bruising.   Neurological:      General: No focal deficit present.      Mental Status: She is alert and oriented to person, place, and time. Mental status is at baseline.      GCS: GCS eye subscore is 4. GCS verbal subscore is 5. GCS motor subscore is 6.      Cranial Nerves: No cranial nerve deficit.      Sensory: No sensory deficit.      Motor: Motor function is intact.      Deep Tendon Reflexes:      Reflex Scores:       Tricep reflexes are 2+ on the right side and 2+ on the left side.       Bicep reflexes are 2+ on the right side and 2+ on the left side.       Brachioradialis reflexes are 2+ on the right side and 2+ on the left side.       Patellar reflexes are 2+ on the right side and 2+ on the left side.       Achilles reflexes are 2+ on the right side and 2+ on the left side.  Psychiatric:         Attention and Perception: Attention and perception normal.         Mood and Affect: Mood normal.         Speech: Speech normal.         Behavior: Behavior normal. Behavior is cooperative.         Thought Content: Thought content normal.         Cognition and Memory: Cognition and memory normal.         Judgment: Judgment  normal.         Laboratory:  Recent Labs     01/22/24 1915   WBC 10.9*   RBC 4.08*   HEMOGLOBIN 13.1   HEMATOCRIT 39.3   MCV 96.3   MCH 32.1   MCHC 33.3   RDW 58.5*   PLATELETCT 318   MPV 9.6     Recent Labs     01/22/24 1915   SODIUM 139   POTASSIUM 4.1   CHLORIDE 104   CO2 22   GLUCOSE 126*   BUN 34*   CREATININE 0.82   CALCIUM 7.9*     Recent Labs     01/22/24 1915   ALTSGPT 29   ASTSGOT 31   ALKPHOSPHAT 101*   TBILIRUBIN 0.3   GLUCOSE 126*         Recent Labs     01/22/24 1915   NTPROBNP 812*         Recent Labs     01/22/24 1915   TROPONINT 20*       Imaging:  DX-SACRUM AND COCCYX 2+   Final Result         1.  Negative sacrum and coccyx exam.   2.  Atherosclerosis      DX-CHEST-PORTABLE (1 VIEW)   Final Result         1.  No acute cardiopulmonary disease.   2.  Atherosclerosis   3.  Hyperexpansion of lungs favors changes of COPD.      EC-ECHOCARDIOGRAM COMPLETE W/ CONT    (Results Pending)   CT-HEAD W/O    (Results Pending)       X-Ray:  I have personally reviewed the images and compared with prior images.  EKG:  I have personally reviewed the images and compared with prior images.    Assessment/Plan:  Justification for Admission Status  I anticipate this patient is appropriate for observation status at this time because patient had a syncopal event which may be vasovagal patient will require 23 hours or less of hospital management.    Patient will need a Telemetry bed on MEDICAL service .  The need is secondary to syncope.    * Syncope and collapse- (present on admission)  Assessment & Plan  Patient today while sitting on her sofa suddenly rolled her eyes and passed out.  Apparently when she came to she was complaining of a headache.  Patient does not remember the event  Potential vasovagal syncope  Patient did not hit her head  Check orthostatics  Give fluid resuscitation  Obtain echocardiogram  CT of the head  Serial troponins  Telemetry monitoring    CAD (coronary artery disease)- (present on  admission)  Assessment & Plan  Continue with medication optimization currently on statin and metoprolol.  Has history of stents  Currently chest pain-free    Primary osteoarthritis of both knees- (present on admission)  Assessment & Plan  Pain management    Macular degeneration of right eye- (present on admission)  Assessment & Plan  Continue outpatient follow-up with ophthalmology    S/P ablation of atrial fibrillation- (present on admission)  Assessment & Plan  Currently in sinus rhythm and on metoprolol doing well  No anticoagulation due to falls  The patient does take Plavix        VTE prophylaxis: SCDs/TEDs

## 2024-01-23 NOTE — ASSESSMENT & PLAN NOTE
Continue with medication optimization currently on statin and metoprolol.  Has history of stents  Currently chest pain-free

## 2024-01-23 NOTE — ED PROVIDER NOTES
ED Provider Note    CHIEF COMPLAINT  Chief Complaint   Patient presents with    Syncope       EXTERNAL RECORDS REVIEWED  Outpatient Notes was seen on the second for respiratory tract infection type symptoms, was given a prescription for doxycycline, chest x-ray was negative.  Was reseen proximately 6 days ago with continued cough and congestion.  Was prescribed steroids.    HPI/ROS  LIMITATION TO HISTORY   Select: : None  OUTSIDE HISTORIAN(S):  Family at bedside providing additional history    Chantelle Sánchez is a 89 y.o. female who presents to the ED secondary to syncopal event.  The patient is living with her family, she was at dinner, was watching the evening news on the couch, states that she felt hot, they turned off the heater, then the patient had a syncopal episode, she lost consciousness, eyes rolled back, she had some shaking.  The patient states that she felt hot, she did not feel any chest pains or shortness of breath.  The patient does have a history of coronary artery disease, 3 stents several years ago.  The patient has had a recent cold, has just recently been on steroids, she has been improved from the cold.  She has slight runny nose and congestion, minimal cough, no shortness of breath, no abdominal pains, nausea vomiting.  States that she has been eating and drinking well.    PAST MEDICAL HISTORY   has a past medical history of History of heart artery stent, History of heart attack, Hyperlipidemia, Hypertension, and Osteoarthritis.    SURGICAL HISTORY   has a past surgical history that includes rhinoplasty; knee arthroscopy; cataract extraction with iol; other orthopedic surgery; and carpal tunnel release.    FAMILY HISTORY  History reviewed. No pertinent family history.    SOCIAL HISTORY  Social History     Tobacco Use    Smoking status: Never    Smokeless tobacco: Never   Vaping Use    Vaping Use: Never used   Substance and Sexual Activity    Alcohol use: Not Currently    Drug use: Not  "Currently    Sexual activity: Not Currently       CURRENT MEDICATIONS  Home Medications       Reviewed by Jj Patino M.D. (Physician) on 01/22/24 at 2128  Med List Status: Not Addressed     Medication Last Dose Status   albuterol 108 (90 Base) MCG/ACT Aero Soln inhalation aerosol  Active   amoxicillin-clavulanate (AUGMENTIN) 875-125 MG Tab  Active   atorvastatin (LIPITOR) 80 MG tablet  Active   celecoxib (CELEBREX) 200 MG Cap  Active   cetirizine (ZYRTEC) 10 MG Tab  Active   clopidogrel (PLAVIX) 75 MG Tab  Active   Dextromethorphan-guaiFENesin (MUCINEX DM)  MG TABLET SR 12 HR  Active   fluorouracil (EFUDEX) 5 % cream  Active   fluticasone (FLONASE) 50 MCG/ACT nasal spray  Active   ipratropium-albuterol (DUONEB) 0.5-2.5 (3) MG/3ML nebulizer solution  Active   lansoprazole (PREVACID) 30 MG CAPSULE DELAYED RELEASE  Active   methylPREDNISolone (MEDROL DOSEPAK) 4 MG Tablet Therapy Pack  Active   metoprolol SR (TOPROL XL) 50 MG TABLET SR 24 HR  Active   Naloxone (NARCAN) 4 MG/0.1ML Liquid  Active   traMADol (ULTRAM) 50 MG Tab  Active                    ALLERGIES  No Known Allergies    PHYSICAL EXAM  VITAL SIGNS: BP (!) 143/65   Pulse 75   Temp 36.4 °C (97.6 °F) (Temporal)   Resp 17   Ht 1.575 m (5' 2\")   Wt 69.4 kg (153 lb)   SpO2 93%   BMI 27.98 kg/m²    Well-developed well-nourished 89-year-old female who appears in mild distress  Atraumatic, Jonna, oropharynx is clear  Neck is supple  Clear to auscultation bilaterally  Regular rhythm  Abdomen soft nontender  Neuro awake alert speech is clear  No deformity seen    DIAGNOSTIC STUDIES / PROCEDURES  Results for orders placed or performed during the hospital encounter of 01/22/24   CBC w/ Differential   Result Value Ref Range    WBC 10.9 (H) 4.8 - 10.8 K/uL    RBC 4.08 (L) 4.20 - 5.40 M/uL    Hemoglobin 13.1 12.0 - 16.0 g/dL    Hematocrit 39.3 37.0 - 47.0 %    MCV 96.3 81.4 - 97.8 fL    MCH 32.1 27.0 - 33.0 pg    MCHC 33.3 32.2 - 35.5 g/dL    RDW 58.5 (H) " 35.9 - 50.0 fL    Platelet Count 318 164 - 446 K/uL    MPV 9.6 9.0 - 12.9 fL    Neutrophils-Polys 60.00 44.00 - 72.00 %    Lymphocytes 27.60 22.00 - 41.00 %    Monocytes 9.70 0.00 - 13.40 %    Eosinophils 1.10 0.00 - 6.90 %    Basophils 0.50 0.00 - 1.80 %    Immature Granulocytes 1.10 (H) 0.00 - 0.90 %    Nucleated RBC 0.00 0.00 - 0.20 /100 WBC    Neutrophils (Absolute) 6.52 1.82 - 7.42 K/uL    Lymphs (Absolute) 2.99 1.00 - 4.80 K/uL    Monos (Absolute) 1.05 (H) 0.00 - 0.85 K/uL    Eos (Absolute) 0.12 0.00 - 0.51 K/uL    Baso (Absolute) 0.05 0.00 - 0.12 K/uL    Immature Granulocytes (abs) 0.12 (H) 0.00 - 0.11 K/uL    NRBC (Absolute) 0.00 K/uL   Complete Metabolic Panel (CMP)   Result Value Ref Range    Sodium 139 135 - 145 mmol/L    Potassium 4.1 3.6 - 5.5 mmol/L    Chloride 104 96 - 112 mmol/L    Co2 22 20 - 33 mmol/L    Anion Gap 13.0 7.0 - 16.0    Glucose 126 (H) 65 - 99 mg/dL    Bun 34 (H) 8 - 22 mg/dL    Creatinine 0.82 0.50 - 1.40 mg/dL    Calcium 7.9 (L) 8.5 - 10.5 mg/dL    Correct Calcium 8.2 (L) 8.5 - 10.5 mg/dL    AST(SGOT) 31 12 - 45 U/L    ALT(SGPT) 29 2 - 50 U/L    Alkaline Phosphatase 101 (H) 30 - 99 U/L    Total Bilirubin 0.3 0.1 - 1.5 mg/dL    Albumin 3.6 3.2 - 4.9 g/dL    Total Protein 6.5 6.0 - 8.2 g/dL    Globulin 2.9 1.9 - 3.5 g/dL    A-G Ratio 1.2 g/dL   Troponin - STAT Once   Result Value Ref Range    Troponin T 20 (H) 6 - 19 ng/L   proBrain Natriuretic Peptide, NT   Result Value Ref Range    NT-proBNP 812 (H) 0 - 125 pg/mL   URINALYSIS    Specimen: Urine   Result Value Ref Range    Color Yellow     Character Clear     Specific Gravity 1.019 <1.035    Ph 5.5 5.0 - 8.0    Glucose Negative Negative mg/dL    Ketones Negative Negative mg/dL    Protein Negative Negative mg/dL    Bilirubin Negative Negative    Urobilinogen, Urine 0.2 Negative    Nitrite Negative Negative    Leukocyte Esterase Negative Negative    Occult Blood Negative Negative    Micro Urine Req see below    ESTIMATED GFR   Result  Value Ref Range    GFR (CKD-EPI) 65 >60 mL/min/1.73 m 2   EKG   Result Value Ref Range    Report       [facility] Emergency Dept.    Test Date:  2024  Pt Name:    TRAE FRENCH   Department: ER  MRN:        [id number]                      Room:       ORTHO  Gender:     Female                       Technician: [id number]  :        [id number]                   Requested By:ER TRIAGE PROTOCOL  Order #:    [id number]                    Reading MD: ASHLEY BOWEN MD    Measurements  Intervals                                Axis  Rate:       66                           P:          -7  RI:         215                          QRS:        -50  QRSD:       97                           T:          15  QT:         437  QTc:        458    Interpretive Statements  Sinus rhythm  Atrial premature complex  Borderline prolonged RI interval  Inferior infarct, old  No previous ECG available for comparison  Electronically Signed On 2024 19:50:00 PST by ASHLEY BOWEN MD            RADIOLOGY  I have independently interpreted the diagnostic imaging associated with this visit and am waiting the final reading from the radiologist.   My preliminary interpretation is as follows: No pneumonia  Radiologist interpretation:   DX-SACRUM AND COCCYX 2+   Final Result         1.  Negative sacrum and coccyx exam.   2.  Atherosclerosis      DX-CHEST-PORTABLE (1 VIEW)   Final Result         1.  No acute cardiopulmonary disease.   2.  Atherosclerosis   3.  Hyperexpansion of lungs favors changes of COPD.      EC-ECHOCARDIOGRAM COMPLETE W/ CONT    (Results Pending)   CT-HEAD W/O    (Results Pending)         INITIAL ASSESSMENT, COURSE AND PLAN  Care Narrative: With a syncopal episode, the patient was sitting down during the episode, it does seem like a vasovagal episode, with the patient's heart rate low and the blood pressure low.  Will give the patient 500 cc of normal saline due to the patient's  hypotension and dry mucous membranes with improvement the patient's blood pressure.  Due to the patient's age and comorbidities I believe the patient will likely need to be admitted to the hospital.  Will check laboratory tests including a troponin, EKG is unremarkable.    Workup here is unremarkable, discussed case with the hospitalist for hospitalization.      DISPOSITION AND DISCUSSIONS  I have discussed management of the patient with the following physicians and DAVID's: Carey    FINAL DIAGNOSIS  1. Syncope, unspecified syncope type           Electronically signed by: Lalo Smith M.D., 1/22/2024 7:45 PM

## 2024-01-24 ENCOUNTER — PATIENT OUTREACH (OUTPATIENT)
Dept: MEDICAL GROUP | Facility: MEDICAL CENTER | Age: 89
End: 2024-01-24
Payer: MEDICARE

## 2024-01-24 NOTE — PROGRESS NOTES
1/24: 1st Patient outreach for TCM.  LVM with contact information.  1/25: 2nd Patient outreach for TCM.  LVM with contact information.

## 2024-01-24 NOTE — DISCHARGE SUMMARY
Discharge Summary    CHIEF COMPLAINT ON ADMISSION  Chief Complaint   Patient presents with    Syncope       Reason for Admission  ems     Admission Date  1/22/2024        HPI & HOSPITAL COURSE    Chantelle Sánchez is a 89 y.o. female who presented 1/22/2024 with syncope.  Patient was apparently in her normal state of health at home sitting on her sofa, she suddenly passed out family did see it.  She apparently rolled her eyes into the back of her head and then passed out.  She did not hit her head.  She had no chest pain.  She did have a headache afterwards.     The patient was admitted and monitored on telemetry.  Troponin was negative.  She did not have any arrhythmias on monitoring.  She did not have any recurrence of her symptoms.  Echocardiogram revealed normal LV size and function with aortic sclerosis moderate AI and calcified leaflets with some mobile components.  I discussed with cardiology her clinical picture is not consistent with endocarditis and based on imaging they did not feel that TOAN would be indicated.  I ordered blood cultures.    Patient has not established with a local cardiologist since moving locally I placed a referral to outpatient cardiology given prior history of CAD  The patient tolerated mobilization and she feels back to her baseline she is otherwise clinically stable for discharge    Therefore, she is discharged in good and stable condition to home with close outpatient follow-up.        Discharge Date  1/23/2024    FOLLOW UP ITEMS POST DISCHARGE  Follow-up on blood culture results  Follow-up with PCP for recheck  Follow-up with cardiology    DISCHARGE DIAGNOSES  Principal Problem:    Syncope and collapse (POA: Yes)  Active Problems:    S/P ablation of atrial fibrillation (POA: Yes)      Overview: Patient with history of atrial fibrillation.  Now status post ablation      She is currently on metoprolol 50 mg once daily not on anticoagulation       therapy.  She takes Plavix 75 mg for  history of CAD with stent      Following up with cardiology    Macular degeneration of right eye (POA: Yes)    Primary osteoarthritis of both knees (POA: Yes)    CAD (coronary artery disease) (POA: Yes)  Resolved Problems:    * No resolved hospital problems. *      FOLLOW UP  Future Appointments   Date Time Provider Department Center   2/14/2024  1:15 PM Griselda Forbes P.A.-C. [facility]   2/22/2024  2:20 PM Philip Angulo D.O. [facility] None     Tali López M.D.  [address]  [address]  [facility] [address]  [phone]    Schedule an appointment as soon as possible for a visit        MEDICATIONS ON DISCHARGE     Medication List        CONTINUE taking these medications        Instructions   albuterol 108 (90 Base) MCG/ACT Aers inhalation aerosol   Doctor's comments: Give albuterol that is patient or insurance preference please  Inhale 2 Puffs every four hours as needed for Shortness of Breath.  Dose: 2 Puff     atorvastatin 80 MG tablet  Commonly known as: Lipitor   Take 80 mg by mouth every evening.  Dose: 80 mg     celecoxib 200 MG Caps  Commonly known as: CeleBREX   Take 200 mg by mouth every morning.  Dose: 200 mg     cetirizine 10 MG Tabs  Commonly known as: ZyrTEC   Take 10 mg by mouth every day.  Dose: 10 mg     clopidogrel 75 MG Tabs  Commonly known as: Plavix      fluorouracil 5 % cream  Commonly known as: Efudex   APPLY TOPICALLY TO THE AFFECTED AREA OF FACE TWICE DAILY IN THE MORNING AND IN THE EVENING FOR 4 TO 6 WEEKS     fluticasone 50 MCG/ACT nasal spray  Commonly known as: Flonase   Administer 1 Spray into affected nostril(S) every day.  Dose: 1 Spray     ipratropium-albuterol 0.5-2.5 (3) MG/3ML nebulizer solution  Commonly known as: Duoneb   Take 3 mL by nebulization every 6 hours as needed for Shortness of Breath (wheezing).  Dose: 3 mL     lansoprazole 30 MG Cpdr  Commonly known as: Prevacid   TAKE 1 CAPSULE BY MOUTH EVERY DAY  Dose: 30 mg     metoprolol SR 50 MG Tb24  Commonly known as:  Toprol XL   Take 50 mg by mouth every day.  Dose: 50 mg     Mucinex DM  MG Tb12   Take 1 Each by mouth 3 times a day as needed (cough).  Dose: 1 Each     Naloxone 4 MG/0.1ML Liqd  Commonly known as: Narcan   One spray in one nostril for overdose and call 911.     traMADol 50 MG Tabs  Commonly known as: Ultram   Take 0.5-1 Tablets by mouth 2 times a day as needed for Severe Pain for up to 30 days.  Dose: 25-50 mg            STOP taking these medications      amoxicillin-clavulanate 875-125 MG Tabs  Commonly known as: Augmentin     methylPREDNISolone 4 MG Tbpk  Commonly known as: Medrol Dosepak              Allergies  No Known Allergies    DIET  No orders of the defined types were placed in this encounter.      ACTIVITY  As tolerated.  Weight bearing as tolerated        LABORATORY  Lab Results   Component Value Date    SODIUM 139 01/23/2024    POTASSIUM 3.9 01/23/2024    CHLORIDE 104 01/23/2024    CO2 22 01/23/2024    GLUCOSE 97 01/23/2024    BUN 23 (H) 01/23/2024    CREATININE 0.62 01/23/2024        Lab Results   Component Value Date    WBC 11.2 (H) 01/23/2024    HEMOGLOBIN 13.3 01/23/2024    HEMATOCRIT 40.4 01/23/2024    PLATELETCT 324 01/23/2024        Total time of the discharge process exceeds 35 minutes.

## 2024-01-28 LAB
BACTERIA BLD CULT: NORMAL
BACTERIA BLD CULT: NORMAL
SIGNIFICANT IND 70042: NORMAL
SIGNIFICANT IND 70042: NORMAL
SITE SITE: NORMAL
SITE SITE: NORMAL
SOURCE SOURCE: NORMAL
SOURCE SOURCE: NORMAL

## 2024-01-31 ENCOUNTER — OFFICE VISIT (OUTPATIENT)
Dept: MEDICAL GROUP | Facility: MEDICAL CENTER | Age: 89
End: 2024-01-31
Payer: MEDICARE

## 2024-01-31 VITALS
DIASTOLIC BLOOD PRESSURE: 74 MMHG | HEIGHT: 62 IN | SYSTOLIC BLOOD PRESSURE: 110 MMHG | HEART RATE: 76 BPM | WEIGHT: 148.81 LBS | OXYGEN SATURATION: 95 % | TEMPERATURE: 97.5 F | BODY MASS INDEX: 27.38 KG/M2

## 2024-01-31 DIAGNOSIS — I25.10 CORONARY ARTERY DISEASE INVOLVING NATIVE CORONARY ARTERY OF NATIVE HEART WITHOUT ANGINA PECTORIS: ICD-10-CM

## 2024-01-31 DIAGNOSIS — Z09 HOSPITAL DISCHARGE FOLLOW-UP: Primary | ICD-10-CM

## 2024-01-31 DIAGNOSIS — R55 SYNCOPE AND COLLAPSE: ICD-10-CM

## 2024-01-31 PROCEDURE — 3074F SYST BP LT 130 MM HG: CPT | Performed by: STUDENT IN AN ORGANIZED HEALTH CARE EDUCATION/TRAINING PROGRAM

## 2024-01-31 PROCEDURE — 3078F DIAST BP <80 MM HG: CPT | Performed by: STUDENT IN AN ORGANIZED HEALTH CARE EDUCATION/TRAINING PROGRAM

## 2024-01-31 PROCEDURE — 99495 TRANSJ CARE MGMT MOD F2F 14D: CPT | Performed by: STUDENT IN AN ORGANIZED HEALTH CARE EDUCATION/TRAINING PROGRAM

## 2024-01-31 ASSESSMENT — FIBROSIS 4 INDEX: FIB4 SCORE: 1.71

## 2024-01-31 NOTE — PROGRESS NOTES
Subjective:     Chantelle Sánchez is a 89 y.o. female who presents for Hospital Follow-up.    Transitional Care Management  TCM Outreach Date and Time: Filed (1/24/2024  8:51 AM)    Discharge Questions  Actual Discharge Date: 01/23/24  Did you receive any new prescriptions?: No  Did you have any durable medical equipment ordered?: No  If Home Health was ordered, have they contacted you (Patient): Not Applicable  Does this patient qualify for the CCM program?: No    Transitional Care  Number of attempts made to contact patient: 2  Current or previous attempts completed within two business days of discharge? : Yes  Has patient completed an Advanced Directive?: No  Has the Care Manager's phone number provided?: No    Discharge Summary  Chief Complaint: syncope (pt was sitting on her sofa, suddenly passed out family did see it. She did not hit her head.  She had no chest pain. She did have a headache afterwards.)  Admitting Diagnosis: ems (trop negative, ekg negative, echo showed normal LV size and function with aortic sclerosis moderate AI and calcified leaflets with some mobile components.)  Discharge Diagnosis: syncope and collapse (cards consulted, pt to f/u with cardiology outpatient for CAD)        HPI:   Patient was recently hospitalized on 1/22/2022 for a syncopal episode.  Patient was apparently sitting on her sofa when she suddenly passed out in front of family, witnessed.  Before that episode patient denies having any chest pain, palpitations, shortness of breath.  Patient reports that she felt a sharp warmth feeling hot, she was trying to take off her jacket and tried to stand up passed out.   In the hospital patient was placed on telemetry without any arrhythmias on the monitor.  Her troponin levels are negative.  Echocardiogram was done which showed normal LV size and left ventricular function with moderate aortic valve insufficiency with sclerosis. patient was told that this was probably vasovagal episode  with orthostatic hypotension.  Patient is referred to outpatient cardiologist as well.    Today doing good. No concerns. Denies any symptoms of chest pain, palpitations, dizziness, vision change, headache .      Current medicines (including reconciliation performed today)  Current Outpatient Medications   Medication Sig Dispense Refill    Dextromethorphan-guaiFENesin (MUCINEX DM)  MG TABLET SR 12 HR Take 1 Each by mouth 3 times a day as needed (cough). 30 Tablet 0    traMADol (ULTRAM) 50 MG Tab Take 0.5-1 Tablets by mouth 2 times a day as needed for Severe Pain for up to 30 days. 60 Tablet 0    ipratropium-albuterol (DUONEB) 0.5-2.5 (3) MG/3ML nebulizer solution Take 3 mL by nebulization every 6 hours as needed for Shortness of Breath (wheezing). 30 Each 0    Naloxone (NARCAN) 4 MG/0.1ML Liquid One spray in one nostril for overdose and call 911. 1 Each 0    fluorouracil (EFUDEX) 5 % cream APPLY TOPICALLY TO THE AFFECTED AREA OF FACE TWICE DAILY IN THE MORNING AND IN THE EVENING FOR 4 TO 6 WEEKS      lansoprazole (PREVACID) 30 MG CAPSULE DELAYED RELEASE TAKE 1 CAPSULE BY MOUTH EVERY DAY 90 Capsule 0    albuterol 108 (90 Base) MCG/ACT Aero Soln inhalation aerosol Inhale 2 Puffs every four hours as needed for Shortness of Breath. 1 Each 1    fluticasone (FLONASE) 50 MCG/ACT nasal spray Administer 1 Spray into affected nostril(S) every day.      cetirizine (ZYRTEC) 10 MG Tab Take 10 mg by mouth every day.      celecoxib (CELEBREX) 200 MG Cap Take 200 mg by mouth every morning.      clopidogrel (PLAVIX) 75 MG Tab       metoprolol SR (TOPROL XL) 50 MG TABLET SR 24 HR Take 50 mg by mouth every day.      atorvastatin (LIPITOR) 80 MG tablet Take 80 mg by mouth every evening.       No current facility-administered medications for this visit.       Allergies:   Patient has no known allergies.    Social History     Tobacco Use    Smoking status: Never    Smokeless tobacco: Never   Vaping Use    Vaping Use: Never used  "  Substance Use Topics    Alcohol use: Not Currently    Drug use: Not Currently       ROS:  Review of Systems   Constitutional:  Negative for fever and malaise/fatigue.   HENT:  Negative for congestion and sore throat.    Eyes:  Negative for blurred vision.   Respiratory:  Negative for cough, shortness of breath and wheezing.    Cardiovascular:  Negative for chest pain, palpitations and leg swelling.   Gastrointestinal:  Negative for blood in stool, heartburn and nausea.   Genitourinary:  Negative for dysuria and urgency.   Musculoskeletal:  Negative for falls and myalgias.   Neurological:  Negative for dizziness and headaches.   Psychiatric/Behavioral:  Negative for depression and suicidal ideas.         Objective:     Vitals:    01/31/24 1436   BP: 110/74   BP Location: Left arm   Patient Position: Sitting   BP Cuff Size: Adult   Pulse: 76   Temp: 36.4 °C (97.5 °F)   TempSrc: Temporal   SpO2: 95%   Weight: 67.5 kg (148 lb 13 oz)   Height: 1.575 m (5' 2\")     Body mass index is 27.22 kg/m².    Physical Exam:  Physical Exam  Constitutional:       Appearance: Normal appearance.   HENT:      Head: Normocephalic.   Eyes:      General: No scleral icterus.  Cardiovascular:      Rate and Rhythm: Normal rate and regular rhythm.      Pulses: Normal pulses.      Heart sounds: Normal heart sounds.   Pulmonary:      Effort: Pulmonary effort is normal.      Breath sounds: Normal breath sounds.   Musculoskeletal:      Right lower leg: No edema.      Left lower leg: No edema.   Skin:     General: Skin is warm.   Neurological:      Mental Status: She is alert and oriented to person, place, and time.   Psychiatric:         Mood and Affect: Mood normal.         Behavior: Behavior normal.           Assessment and Plan:   1. Hospital discharge follow-up  Syncope and collapse  Most likely syncopal episode due to vasovagal etiology.  Other possible etiologies could be cardiac.  During hospital stay telemetry did not show any " arrhythmias.  Patient has referral to outpatient cardiology for further evaluation. \  Currently asymptomatic blood pressure is stable  - TSH WITH REFLEX TO FT4; Future  - Lipid Profile; Future    2. Coronary artery disease involving native coronary artery of native heart without angina pectoris  Chronic, stable  Continue Plavix 75 mg daily , Lipitor 80 mg daily and metoprolol 50 mg daily.  Continue   - Lipid Profile; Future      - Chart and discharge summary were reviewed.   - Hospitalization and results reviewed with patient.   - Medications reviewed including instructions regarding high risk medications, dosing and side effects.  - Recommended Services: No services needed at this time  - Advance directive/POLST on file?  No   Recommended considering POLST Having an advanced directive  Follow-up:Return in about 3 months (around 4/30/2024), or if symptoms worsen or fail to improve.    Face-to-face transitional care management services with MODERATE (today's visit is within 14 days post discharge & LACE+ score of 28-58) medical decision complexity were provided.

## 2024-02-06 ENCOUNTER — APPOINTMENT (RX ONLY)
Dept: URBAN - METROPOLITAN AREA CLINIC 6 | Facility: CLINIC | Age: 89
Setting detail: DERMATOLOGY
End: 2024-02-06

## 2024-02-06 PROBLEM — D04.39 CARCINOMA IN SITU OF SKIN OF OTHER PARTS OF FACE: Status: ACTIVE | Noted: 2024-02-06

## 2024-02-06 PROCEDURE — 99213 OFFICE O/P EST LOW 20 MIN: CPT

## 2024-02-06 PROCEDURE — ? COUNSELING

## 2024-02-06 PROCEDURE — ? PRESCRIPTION MEDICATION MANAGEMENT

## 2024-02-06 NOTE — PROCEDURE: PRESCRIPTION MEDICATION MANAGEMENT
Detail Level: Zone
Render In Strict Bullet Format?: No
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 Treating w/ 5FU bid. Advised pt to continue x 1 month. Recommended applying Aquaphor or Urea cream between applications. Will f/u in 4 weeks - consider C&D if no improvement.

## 2024-02-09 ENCOUNTER — TELEPHONE (OUTPATIENT)
Dept: HEALTH INFORMATION MANAGEMENT | Facility: OTHER | Age: 89
End: 2024-02-09
Payer: MEDICARE

## 2024-02-22 ENCOUNTER — APPOINTMENT (OUTPATIENT)
Dept: PHYSICAL MEDICINE AND REHAB | Facility: MEDICAL CENTER | Age: 89
End: 2024-02-22
Payer: MEDICARE

## 2024-03-06 ENCOUNTER — OFFICE VISIT (OUTPATIENT)
Dept: PHYSICAL MEDICINE AND REHAB | Facility: MEDICAL CENTER | Age: 89
End: 2024-03-06
Payer: MEDICARE

## 2024-03-06 VITALS
WEIGHT: 150 LBS | HEART RATE: 77 BPM | HEIGHT: 60 IN | OXYGEN SATURATION: 94 % | SYSTOLIC BLOOD PRESSURE: 112 MMHG | BODY MASS INDEX: 29.45 KG/M2 | DIASTOLIC BLOOD PRESSURE: 72 MMHG | TEMPERATURE: 97 F

## 2024-03-06 DIAGNOSIS — M54.50 CHRONIC BILATERAL LOW BACK PAIN WITHOUT SCIATICA: ICD-10-CM

## 2024-03-06 DIAGNOSIS — M47.816 LUMBAR SPONDYLOSIS: ICD-10-CM

## 2024-03-06 DIAGNOSIS — I25.2 HISTORY OF MI (MYOCARDIAL INFARCTION): ICD-10-CM

## 2024-03-06 DIAGNOSIS — M51.36 LUMBAR DEGENERATIVE DISC DISEASE: ICD-10-CM

## 2024-03-06 DIAGNOSIS — G89.29 CHRONIC BILATERAL LOW BACK PAIN WITHOUT SCIATICA: ICD-10-CM

## 2024-03-06 DIAGNOSIS — Z95.5 S/P CORONARY ARTERY STENT PLACEMENT: ICD-10-CM

## 2024-03-06 PROCEDURE — 1125F AMNT PAIN NOTED PAIN PRSNT: CPT | Performed by: PHYSICAL MEDICINE & REHABILITATION

## 2024-03-06 PROCEDURE — 99204 OFFICE O/P NEW MOD 45 MIN: CPT | Performed by: PHYSICAL MEDICINE & REHABILITATION

## 2024-03-06 PROCEDURE — 3078F DIAST BP <80 MM HG: CPT | Performed by: PHYSICAL MEDICINE & REHABILITATION

## 2024-03-06 PROCEDURE — 3074F SYST BP LT 130 MM HG: CPT | Performed by: PHYSICAL MEDICINE & REHABILITATION

## 2024-03-06 ASSESSMENT — PAIN SCALES - GENERAL: PAINLEVEL: 10=SEVERE PAIN

## 2024-03-06 ASSESSMENT — FIBROSIS 4 INDEX: FIB4 SCORE: 1.71

## 2024-03-06 ASSESSMENT — PATIENT HEALTH QUESTIONNAIRE - PHQ9
SUM OF ALL RESPONSES TO PHQ QUESTIONS 1-9: 10
CLINICAL INTERPRETATION OF PHQ2 SCORE: 2
5. POOR APPETITE OR OVEREATING: 0 - NOT AT ALL

## 2024-03-06 NOTE — PROGRESS NOTES
New patient note    Interventional spine and Pain  Physiatry (physical medicine and  Rehabilitation)     Date of service: See epic    Chief complaint:   Chief Complaint   Patient presents with    New Patient     Back pain        Referring provider: Tali López M.D.     HISTORY    HPI: Chantelle Sánchez 89 y.o.  who presents today with Diagnoses of Chronic bilateral low back pain without sciatica, History of MI (myocardial infarction), S/P coronary artery stent placement, Lumbar spondylosis, and Lumbar degenerative disc disease were pertinent to this visit.    HPI    Chronic bilateral axial low back pain worse with lumbar extension which is been present for many years.  She denies acute changes.  This pain is 10 out of 10 intensity, gradually worsening.    The patient is on multiple different conservative treatments tried in the past including medication management, epidural steroid injections, physical therapy, home exercise program.       Medical records review:  I reviewed the note from the referring provider Tali López M.D. including the note dated 1/31/2024.           ROS:   Red Flags ROS:   Fever, Chills, Sweats: Denies  Involuntary Weight Loss: Denies  Bladder Incontinence: Denies  Bowel Incontinence: denies  Saddle Anesthesia: Denies    All other systems reviewed and negative.       PMHx:   Past Medical History:   Diagnosis Date    History of heart artery stent     History of heart attack     Hyperlipidemia     Hypertension     Osteoarthritis     (B)         Current Outpatient Medications on File Prior to Visit   Medication Sig Dispense Refill    Dextromethorphan-guaiFENesin (MUCINEX DM)  MG TABLET SR 12 HR Take 1 Each by mouth 3 times a day as needed (cough). 30 Tablet 0    ipratropium-albuterol (DUONEB) 0.5-2.5 (3) MG/3ML nebulizer solution Take 3 mL by nebulization every 6 hours as needed for Shortness of Breath (wheezing). 30 Each 0    Naloxone (NARCAN) 4 MG/0.1ML Liquid One spray in one  nostril for overdose and call 911. 1 Each 0    fluorouracil (EFUDEX) 5 % cream APPLY TOPICALLY TO THE AFFECTED AREA OF FACE TWICE DAILY IN THE MORNING AND IN THE EVENING FOR 4 TO 6 WEEKS      lansoprazole (PREVACID) 30 MG CAPSULE DELAYED RELEASE TAKE 1 CAPSULE BY MOUTH EVERY DAY 90 Capsule 0    albuterol 108 (90 Base) MCG/ACT Aero Soln inhalation aerosol Inhale 2 Puffs every four hours as needed for Shortness of Breath. 1 Each 1    fluticasone (FLONASE) 50 MCG/ACT nasal spray Administer 1 Spray into affected nostril(S) every day.      cetirizine (ZYRTEC) 10 MG Tab Take 10 mg by mouth every day.      celecoxib (CELEBREX) 200 MG Cap Take 200 mg by mouth every morning.      clopidogrel (PLAVIX) 75 MG Tab       metoprolol SR (TOPROL XL) 50 MG TABLET SR 24 HR Take 50 mg by mouth every day.      atorvastatin (LIPITOR) 80 MG tablet Take 80 mg by mouth every evening.       No current facility-administered medications on file prior to visit.        PSHx:   Past Surgical History:   Procedure Laterality Date    CARPAL TUNNEL RELEASE      CATARACT EXTRACTION WITH IOL      KNEE ARTHROSCOPY      OTHER ORTHOPEDIC SURGERY      toe    RHINOPLASTY         Family history   History reviewed. No pertinent family history.      Medications: reviewed on epic.   Outpatient Medications Marked as Taking for the 3/6/24 encounter (Office Visit) with Enrique Louise M.D.   Medication Sig Dispense Refill    Dextromethorphan-guaiFENesin (MUCINEX DM)  MG TABLET SR 12 HR Take 1 Each by mouth 3 times a day as needed (cough). 30 Tablet 0    ipratropium-albuterol (DUONEB) 0.5-2.5 (3) MG/3ML nebulizer solution Take 3 mL by nebulization every 6 hours as needed for Shortness of Breath (wheezing). 30 Each 0    Naloxone (NARCAN) 4 MG/0.1ML Liquid One spray in one nostril for overdose and call 911. 1 Each 0    fluorouracil (EFUDEX) 5 % cream APPLY TOPICALLY TO THE AFFECTED AREA OF FACE TWICE DAILY IN THE MORNING AND IN THE EVENING FOR 4 TO 6 WEEKS       lansoprazole (PREVACID) 30 MG CAPSULE DELAYED RELEASE TAKE 1 CAPSULE BY MOUTH EVERY DAY 90 Capsule 0    albuterol 108 (90 Base) MCG/ACT Aero Soln inhalation aerosol Inhale 2 Puffs every four hours as needed for Shortness of Breath. 1 Each 1    fluticasone (FLONASE) 50 MCG/ACT nasal spray Administer 1 Spray into affected nostril(S) every day.      cetirizine (ZYRTEC) 10 MG Tab Take 10 mg by mouth every day.      celecoxib (CELEBREX) 200 MG Cap Take 200 mg by mouth every morning.      clopidogrel (PLAVIX) 75 MG Tab       metoprolol SR (TOPROL XL) 50 MG TABLET SR 24 HR Take 50 mg by mouth every day.      atorvastatin (LIPITOR) 80 MG tablet Take 80 mg by mouth every evening.          Allergies:   No Known Allergies    Social Hx:   Social History     Socioeconomic History    Marital status:      Spouse name: Not on file    Number of children: Not on file    Years of education: Not on file    Highest education level: Not on file   Occupational History    Not on file   Tobacco Use    Smoking status: Never    Smokeless tobacco: Never   Vaping Use    Vaping Use: Never used   Substance and Sexual Activity    Alcohol use: Not Currently    Drug use: Not Currently    Sexual activity: Not Currently   Other Topics Concern    Not on file   Social History Narrative    Not on file     Social Determinants of Health     Financial Resource Strain: Not on file   Food Insecurity: Not on file   Transportation Needs: Not on file   Physical Activity: Not on file   Stress: Not on file   Social Connections: Not on file   Intimate Partner Violence: Not on file   Housing Stability: Not on file         EXAMINATION     Physical Exam:   Vitals: /72 (BP Location: Right arm, Patient Position: Sitting, BP Cuff Size: Adult)   Pulse 77   Temp 36.1 °C (97 °F) (Temporal)   Ht 1.524 m (5')   Wt 68 kg (150 lb)   SpO2 94%     Constitutional:   Body Habitus: Body mass index is 29.29 kg/m².  Cooperation: Fully cooperates with  exam  Appearance: Well-groomed, well-nourished, not disheveled     Eyes: No scleral icterus to suggest severe liver disease, no proptosis to suggest severe hyperthyroid    ENT -no obvious auditory deficits, no obvious tongue lesions, tongue midline, no facial droop     Skin -no rashes or lesions noted     Respiratory-  breathing comfortable on room air, no audible wheezing    Cardiovascular- capillary refills less than 2 seconds.     Psychiatric- alert and oriented ×3. Normal affect.     Musculoskeletal and Neuro -     Thoracic/Lumbar Spine/Sacral Spine/Hips   Inspection: No evidence of atrophy in bilateral lower extremities throughout     ROM: decreased active range of motion with flexion, lateral flexion, and rotation bilaterally.   There is decreased active range of motion with lumbar extension with pain.    There is pain with facet loading maneuver (extension rotation) with axial low back pain on the BILATERAL side(s)    Palpation:   No tenderness to palpation in midline at T1-T12 levels. No tenderness to palpation in the left and right of the midline T1-L5, NEGATIVE for tenderness to palpation to the para-midline region in the lower lumbar levels.  palpation over SI joint: negative bilaterally    palpation in hip or over the gluteus medius tendon insertion: negative bilaterally      Lumbar spine Special tests  Neuro tension  Straight leg test negative bilaterally    Slump test negative bilaterally      HIP  FAIR test negative bilaterally    Range of motion in the RIGHT hip is full  in flexion, extension, abduction, internal rotation, external rotation.  Range of motion in the LEFT hip is full  in flexion, extension, abduction, internal rotation, external rotation.      SI joint tests  Observation patient sits on one buttocks: Negative  SI joint compression negative bilaterally    SI joint distraction negative bilaterally    Thigh thrust test negative bilaterally    VALORIE test negative bilaterally  "                Key points for the international standards for neurological classification of spinal cord injury (ISNCSCI) to light touch.     Dermatome R L                                      L2 2 2   L3 2 2   L4 2 2   L5 2 2   S1 2 2   S2 2 2       Motor Exam Lower Extremities    ? Myotome R L   Hip flexion L2 5 5   Knee extension L3 5 5   Ankle dorsiflexion L4 5 5   Toe extension L5 5 5   Ankle plantarflexion S1 5 5         Reflexes  Babinski sign negative bilaterally   Clonus of the ankle negative bilaterally       MEDICAL DECISION MAKING    Medical records review: see under HPI section.     DATA    Labs:   Lab Results   Component Value Date/Time    SODIUM 139 01/23/2024 02:14 AM    POTASSIUM 3.9 01/23/2024 02:14 AM    CHLORIDE 104 01/23/2024 02:14 AM    CO2 22 01/23/2024 02:14 AM    ANION 13.0 01/23/2024 02:14 AM    GLUCOSE 97 01/23/2024 02:14 AM    BUN 23 (H) 01/23/2024 02:14 AM    CREATININE 0.62 01/23/2024 02:14 AM    CALCIUM 8.0 (L) 01/23/2024 02:14 AM    ASTSGOT 31 01/22/2024 07:15 PM    ALTSGPT 29 01/22/2024 07:15 PM    TBILIRUBIN 0.3 01/22/2024 07:15 PM    ALBUMIN 3.6 01/22/2024 07:15 PM    TOTPROTEIN 6.5 01/22/2024 07:15 PM    GLOBULIN 2.9 01/22/2024 07:15 PM    AGRATIO 1.2 01/22/2024 07:15 PM   ]    No results found for: \"PROTHROMBTM\", \"INR\"     Lab Results   Component Value Date/Time    WBC 11.2 (H) 01/23/2024 02:14 AM    RBC 4.24 01/23/2024 02:14 AM    HEMOGLOBIN 13.3 01/23/2024 02:14 AM    HEMATOCRIT 40.4 01/23/2024 02:14 AM    MCV 95.3 01/23/2024 02:14 AM    MCH 31.4 01/23/2024 02:14 AM    MCHC 32.9 01/23/2024 02:14 AM    MPV 9.4 01/23/2024 02:14 AM    NEUTSPOLYS 60.00 01/22/2024 07:15 PM    LYMPHOCYTES 27.60 01/22/2024 07:15 PM    MONOCYTES 9.70 01/22/2024 07:15 PM    EOSINOPHILS 1.10 01/22/2024 07:15 PM    BASOPHILS 0.50 01/22/2024 07:15 PM        No results found for: \"HBA1C\"     Imaging:   I personally reviewed following images, these are my reads    x-ray sacrum 1/22/2024  Degenerative disc " disease worst in the lower levels of the lumbar spine.  No acute fractures are seen.      IMAGING radiology reads. I reviewed the following radiology reads                                                                    Results for orders placed in visit on 01/02/24    DX-CHEST-2 VIEWS    Impression  NEGATIVE TWO VIEWS OF THE CHEST.                     Results for orders placed in visit on 10/04/23    DX-KNEE COMPLETE 4+ BILATERAL                          Diagnosis   Visit Diagnoses     ICD-10-CM   1. Chronic bilateral low back pain without sciatica  M54.50    G89.29   2. History of MI (myocardial infarction)  I25.2   3. S/P coronary artery stent placement  Z95.5   4. Lumbar spondylosis  M47.816   5. Lumbar degenerative disc disease  M51.36           ASSESSMENT AND PLAN:  Chantelle Sánchez 89 y.o. female      Chantelle was seen today for new patient.    Diagnoses and all orders for this visit:    Chronic bilateral low back pain without sciatica  -     DX-LUMBAR SPINE-4+ VIEWS; Future  -     MR-LUMBAR SPINE-W/O; Future  -     Referral to Physical Therapy    History of MI (myocardial infarction)    S/P coronary artery stent placement    Lumbar spondylosis    Lumbar degenerative disc disease      There is likely facet mediated pain based on exam.  There may also be signs of spinal stenosis however there is no current finding of neurogenic claudication in the legs.  There are no findings to suggest acute compression fracture on exam.    The patient has no red flag signs on today's exam.  Specifically the patient has no saddle anesthesia, bowel incontinence, bladder incontinence.  We discussed emergency precautions. The patient understands emergency precautions.         Physical therapy: I ordered physical therapy to focus on strengthening and stretching.     home exercise program: I provided the patient with a strengthening and stretching with a home exercise program     Diagnostic workup: As above    Medications:     Acetaminophen up to 1000 mg 3 times daily as needed not to exceed 3000 mg per 24-hours.    Interventional program: I would consider the patient for diagnostic medial branch block depending on the results of the above       Follow-up: After the above diagnostic studies.  This is a complicated case will require longitudinal care.          Please note that this dictation was created using voice recognition software. I have made every reasonable attempt to correct obvious errors but there may be errors of grammar and content that I may have overlooked prior to finalization of this note.      Enrique Louise MD  Physical Medicine and Rehabilitation  Interventional Spine and Sports Physiatry  [facility]          Tali López M.D.

## 2024-03-08 ENCOUNTER — HOSPITAL ENCOUNTER (OUTPATIENT)
Dept: RADIOLOGY | Facility: MEDICAL CENTER | Age: 89
End: 2024-03-08
Attending: PHYSICAL MEDICINE & REHABILITATION
Payer: MEDICARE

## 2024-03-08 DIAGNOSIS — G89.29 CHRONIC BILATERAL LOW BACK PAIN WITHOUT SCIATICA: ICD-10-CM

## 2024-03-08 DIAGNOSIS — M54.50 CHRONIC BILATERAL LOW BACK PAIN WITHOUT SCIATICA: ICD-10-CM

## 2024-03-08 PROCEDURE — 72110 X-RAY EXAM L-2 SPINE 4/>VWS: CPT

## 2024-03-08 PROCEDURE — 72148 MRI LUMBAR SPINE W/O DYE: CPT

## 2024-03-10 DIAGNOSIS — K21.9 GASTROESOPHAGEAL REFLUX DISEASE, UNSPECIFIED WHETHER ESOPHAGITIS PRESENT: ICD-10-CM

## 2024-03-11 RX ORDER — LANSOPRAZOLE 30 MG/1
30 CAPSULE, DELAYED RELEASE ORAL
Qty: 90 CAPSULE | Refills: 0 | Status: SHIPPED | OUTPATIENT
Start: 2024-03-11

## 2024-03-11 NOTE — TELEPHONE ENCOUNTER
Received request via: Pharmacy    Was the patient seen in the last year in this department? Yes    Does the patient have an active prescription (recently filled or refills available) for medication(s) requested? No    [facility]    Does the patient have [facility] and need 100 day supply (blood pressure, diabetes and cholesterol meds only)? Patient does not have [facility]    Future Appointments         Provider Department Center    4/4/2024 10:20 AM (Arrive by 10:05 AM) Enrique Louise M.D. [facility] PHYSIATRY     4/11/2024 1:40 PM Davion Wilcox M.D. [facility] - Operated by [facility]  Arrive at: Cardiology [facility] - Arrival     5/16/2024 1:00 PM SHUN Guerrero Main Totals (Joint) [facility] [place]

## 2024-03-12 ENCOUNTER — APPOINTMENT (RX ONLY)
Dept: URBAN - METROPOLITAN AREA CLINIC 6 | Facility: CLINIC | Age: 89
Setting detail: DERMATOLOGY
End: 2024-03-12

## 2024-03-12 PROBLEM — D04.39 CARCINOMA IN SITU OF SKIN OF OTHER PARTS OF FACE: Status: ACTIVE | Noted: 2024-03-12

## 2024-03-12 PROCEDURE — 99213 OFFICE O/P EST LOW 20 MIN: CPT

## 2024-03-12 PROCEDURE — ? PRESCRIPTION MEDICATION MANAGEMENT

## 2024-03-12 PROCEDURE — ? COUNSELING

## 2024-03-12 NOTE — PROCEDURE: PRESCRIPTION MEDICATION MANAGEMENT
Detail Level: Zone
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 Treated w/ 5FU bid x 8 weeks total. D/c treatment now. Recommended applying Aquaphor. Consider C&D if no improvement.
Render In Strict Bullet Format?: No

## 2024-03-15 ENCOUNTER — APPOINTMENT (OUTPATIENT)
Dept: CARDIOLOGY | Facility: MEDICAL CENTER | Age: 89
End: 2024-03-15
Attending: HOSPITALIST
Payer: MEDICARE

## 2024-03-21 ENCOUNTER — OFFICE VISIT (OUTPATIENT)
Dept: URGENT CARE | Facility: CLINIC | Age: 89
End: 2024-03-21
Payer: MEDICARE

## 2024-03-21 ENCOUNTER — HOSPITAL ENCOUNTER (OUTPATIENT)
Facility: MEDICAL CENTER | Age: 89
End: 2024-03-21
Attending: NURSE PRACTITIONER
Payer: MEDICARE

## 2024-03-21 VITALS
WEIGHT: 150 LBS | HEART RATE: 71 BPM | BODY MASS INDEX: 29.45 KG/M2 | TEMPERATURE: 97.6 F | SYSTOLIC BLOOD PRESSURE: 110 MMHG | RESPIRATION RATE: 18 BRPM | DIASTOLIC BLOOD PRESSURE: 88 MMHG | OXYGEN SATURATION: 95 % | HEIGHT: 60 IN

## 2024-03-21 DIAGNOSIS — N30.00 ACUTE CYSTITIS WITHOUT HEMATURIA: ICD-10-CM

## 2024-03-21 LAB
APPEARANCE UR: CLEAR
BILIRUB UR STRIP-MCNC: NEGATIVE MG/DL
COLOR UR AUTO: YELLOW
GLUCOSE UR STRIP.AUTO-MCNC: NEGATIVE MG/DL
KETONES UR STRIP.AUTO-MCNC: NEGATIVE MG/DL
LEUKOCYTE ESTERASE UR QL STRIP.AUTO: NORMAL
NITRITE UR QL STRIP.AUTO: POSITIVE
PH UR STRIP.AUTO: 7 [PH] (ref 5–8)
PROT UR QL STRIP: NEGATIVE MG/DL
RBC UR QL AUTO: NORMAL
SP GR UR STRIP.AUTO: 1.01
UROBILINOGEN UR STRIP-MCNC: 0.2 MG/DL

## 2024-03-21 PROCEDURE — 87186 SC STD MICRODIL/AGAR DIL: CPT

## 2024-03-21 PROCEDURE — 87077 CULTURE AEROBIC IDENTIFY: CPT

## 2024-03-21 PROCEDURE — 3079F DIAST BP 80-89 MM HG: CPT | Performed by: NURSE PRACTITIONER

## 2024-03-21 PROCEDURE — 87086 URINE CULTURE/COLONY COUNT: CPT

## 2024-03-21 PROCEDURE — 99213 OFFICE O/P EST LOW 20 MIN: CPT | Performed by: NURSE PRACTITIONER

## 2024-03-21 PROCEDURE — 81002 URINALYSIS NONAUTO W/O SCOPE: CPT | Performed by: NURSE PRACTITIONER

## 2024-03-21 PROCEDURE — 3074F SYST BP LT 130 MM HG: CPT | Performed by: NURSE PRACTITIONER

## 2024-03-21 RX ORDER — CEFDINIR 300 MG/1
300 CAPSULE ORAL 2 TIMES DAILY
Qty: 14 CAPSULE | Refills: 0 | Status: SHIPPED | OUTPATIENT
Start: 2024-03-21 | End: 2024-03-28

## 2024-03-21 ASSESSMENT — ENCOUNTER SYMPTOMS
FEVER: 0
GASTROINTESTINAL NEGATIVE: 1
CONSTITUTIONAL NEGATIVE: 1
MUSCULOSKELETAL NEGATIVE: 1
CARDIOVASCULAR NEGATIVE: 1
NEUROLOGICAL NEGATIVE: 1
FLANK PAIN: 0
RESPIRATORY NEGATIVE: 1
PSYCHIATRIC NEGATIVE: 1
CHILLS: 0
EYES NEGATIVE: 1

## 2024-03-21 ASSESSMENT — FIBROSIS 4 INDEX: FIB4 SCORE: 1.71

## 2024-03-22 DIAGNOSIS — N30.00 ACUTE CYSTITIS WITHOUT HEMATURIA: ICD-10-CM

## 2024-03-22 NOTE — PROGRESS NOTES
Subjective:   Chantelle Sánchez is a 89 y.o. female who presents for Dysuria (Frequency )      Dysuria   This is a new problem. The current episode started today. The problem occurs every urination. The problem has been gradually worsening. The quality of the pain is described as burning. The pain is at a severity of 5/10. The pain is moderate. There has been no fever. Associated symptoms include frequency and urgency. Pertinent negatives include no chills, discharge, flank pain, hematuria or hesitancy. She has tried increased fluids for the symptoms. The treatment provided mild relief.       Review of Systems   Constitutional: Negative.  Negative for chills and fever.   HENT: Negative.     Eyes: Negative.    Respiratory: Negative.     Cardiovascular: Negative.    Gastrointestinal: Negative.    Genitourinary:  Positive for dysuria, frequency and urgency. Negative for flank pain, hematuria and hesitancy.   Musculoskeletal: Negative.    Skin: Negative.    Neurological: Negative.    Endo/Heme/Allergies: Negative.    Psychiatric/Behavioral: Negative.     All other systems reviewed and are negative.      Medications, Allergies, and current problem list reviewed today in Epic.     Objective:     /88 (BP Location: Left arm, Patient Position: Sitting, BP Cuff Size: Adult)   Pulse 71   Temp 36.4 °C (97.6 °F) (Temporal)   Resp 18   Ht 1.524 m (5')   Wt 68 kg (150 lb)   SpO2 95%     Physical Exam  Vitals reviewed.   Constitutional:       General: She is not in acute distress.     Appearance: Normal appearance. She is not ill-appearing.   HENT:      Head: Normocephalic and atraumatic.      Nose: Nose normal.      Mouth/Throat:      Mouth: Mucous membranes are moist.      Pharynx: Oropharynx is clear.   Eyes:      Extraocular Movements: Extraocular movements intact.      Conjunctiva/sclera: Conjunctivae normal.      Pupils: Pupils are equal, round, and reactive to light.   Cardiovascular:      Rate and Rhythm:  Normal rate and regular rhythm.      Pulses: Normal pulses.   Pulmonary:      Effort: Pulmonary effort is normal.      Breath sounds: Normal breath sounds.   Abdominal:      General: Abdomen is flat.      Palpations: Abdomen is soft.      Tenderness: There is no abdominal tenderness. There is no right CVA tenderness, left CVA tenderness, guarding or rebound.   Musculoskeletal:         General: Normal range of motion.      Cervical back: Normal range of motion and neck supple.   Skin:     General: Skin is warm and dry.      Capillary Refill: Capillary refill takes less than 2 seconds.   Neurological:      General: No focal deficit present.      Mental Status: She is alert.   Psychiatric:         Mood and Affect: Mood normal.         Behavior: Behavior normal.         Assessment/Plan:     Diagnosis and associated orders:     1. Acute cystitis without hematuria  POCT Urinalysis    URINE CULTURE(NEW)    cefdinir (OMNICEF) 300 MG Cap         Comments/MDM:     The patient's presenting symptoms and exam findings are consistent with a simple urinary tract infection. They are overall very well-appearing with normal vital signs and benign examination findings.   Increase fluid intake.  Urine culture: will call back only if positive and if necessary change in therapy.   Advised to return to the Urgent Care or follow up with their PCP if symptoms are not improving in 2-3 days or sooner if any worsening symptoms such as fever, chills, abdominal pain, back/flank pain, nausea, vomiting, or any other concerns.            Differential diagnosis, natural history, supportive care, and indications for immediate follow-up discussed.    Advised the patient to follow-up with the primary care physician for recheck, reevaluation, and consideration of further management.    Please note that this dictation was created using voice recognition software. I have made a reasonable attempt to correct obvious errors, but I expect that there are  errors of grammar and possibly content that I did not discover before finalizing the note.    This note was electronically signed by ZIGGY Guadalupe

## 2024-03-24 LAB
BACTERIA UR CULT: ABNORMAL
BACTERIA UR CULT: ABNORMAL
SIGNIFICANT IND 70042: ABNORMAL
SITE SITE: ABNORMAL
SOURCE SOURCE: ABNORMAL

## 2024-04-04 ENCOUNTER — APPOINTMENT (OUTPATIENT)
Dept: PHYSICAL MEDICINE AND REHAB | Facility: MEDICAL CENTER | Age: 89
End: 2024-04-04
Payer: MEDICARE

## 2024-04-04 VITALS
TEMPERATURE: 97.3 F | SYSTOLIC BLOOD PRESSURE: 128 MMHG | BODY MASS INDEX: 29.45 KG/M2 | DIASTOLIC BLOOD PRESSURE: 76 MMHG | HEIGHT: 60 IN | OXYGEN SATURATION: 94 % | WEIGHT: 150 LBS | HEART RATE: 82 BPM

## 2024-04-04 DIAGNOSIS — M54.16 LUMBAR RADICULOPATHY: ICD-10-CM

## 2024-04-04 DIAGNOSIS — M51.36 LUMBAR DEGENERATIVE DISC DISEASE: ICD-10-CM

## 2024-04-04 DIAGNOSIS — M47.816 LUMBAR SPONDYLOSIS: ICD-10-CM

## 2024-04-04 DIAGNOSIS — Z95.5 S/P CORONARY ARTERY STENT PLACEMENT: ICD-10-CM

## 2024-04-04 DIAGNOSIS — I25.2 HISTORY OF MI (MYOCARDIAL INFARCTION): ICD-10-CM

## 2024-04-04 PROCEDURE — 99214 OFFICE O/P EST MOD 30 MIN: CPT | Performed by: PHYSICAL MEDICINE & REHABILITATION

## 2024-04-04 PROCEDURE — 3074F SYST BP LT 130 MM HG: CPT | Performed by: PHYSICAL MEDICINE & REHABILITATION

## 2024-04-04 PROCEDURE — 1125F AMNT PAIN NOTED PAIN PRSNT: CPT | Performed by: PHYSICAL MEDICINE & REHABILITATION

## 2024-04-04 PROCEDURE — 3078F DIAST BP <80 MM HG: CPT | Performed by: PHYSICAL MEDICINE & REHABILITATION

## 2024-04-04 PROCEDURE — G2211 COMPLEX E/M VISIT ADD ON: HCPCS | Performed by: PHYSICAL MEDICINE & REHABILITATION

## 2024-04-04 ASSESSMENT — PATIENT HEALTH QUESTIONNAIRE - PHQ9
CLINICAL INTERPRETATION OF PHQ2 SCORE: 1
5. POOR APPETITE OR OVEREATING: 0 - NOT AT ALL
SUM OF ALL RESPONSES TO PHQ QUESTIONS 1-9: 7

## 2024-04-04 ASSESSMENT — PAIN SCALES - GENERAL: PAINLEVEL: 6=MODERATE PAIN

## 2024-04-04 ASSESSMENT — FIBROSIS 4 INDEX: FIB4 SCORE: 1.71

## 2024-04-04 NOTE — PROGRESS NOTES
Follow up patient note  Interventional spine and Pain  Physiatry (physical medicine and  Rehabilitation)       Chief complaint:   Chief Complaint   Patient presents with    Back Pain     Review Imaging          HISTORY    Please see new patient note by Dr Louise,  for more details.     HPI  Patient identification: Chantelle Sánchez , DONTA 10/31/1927,   With Diagnoses of Lumbar radiculopathy, History of MI (myocardial infarction), S/P coronary artery stent placement, Lumbar spondylosis, and Lumbar degenerative disc disease were pertinent to this visit.     Chronic bilateral low back pain rating to the bilateral buttocks 7 out of 10 intensity constant worse with standing walking bending lifting.  She has had a provider driven home exercise program and is attempted this.  She is tried medication management.  She cannot take NSAIDs because of Plavix.  Not on muscle relaxers because of age.       ROS Red Flags :   Fever, Chills, Sweats: Denies  Involuntary Weight Loss: Denies  Bowel/Bladder Incontinence: Denies  Saddle Anesthesia: Denies        PMHx:   Past Medical History:   Diagnosis Date    History of heart artery stent     History of heart attack     Hyperlipidemia     Hypertension     Osteoarthritis     (B)       PSHx:   Past Surgical History:   Procedure Laterality Date    CARPAL TUNNEL RELEASE      CATARACT EXTRACTION WITH IOL      KNEE ARTHROSCOPY      OTHER ORTHOPEDIC SURGERY      toe    RHINOPLASTY         Family history   History reviewed. No pertinent family history.      Medications:   Outpatient Medications Marked as Taking for the 24 encounter (Office Visit) with Enrique Louise M.D.   Medication Sig Dispense Refill    lansoprazole (PREVACID) 30 MG CAPSULE DELAYED RELEASE TAKE 1 CAPSULE BY MOUTH EVERY DAY 90 Capsule 0    Dextromethorphan-guaiFENesin (MUCINEX DM)  MG TABLET SR 12 HR Take 1 Each by mouth 3 times a day as needed (cough). 30 Tablet 0    ipratropium-albuterol (DUONEB) 0.5-2.5 (3)  MG/3ML nebulizer solution Take 3 mL by nebulization every 6 hours as needed for Shortness of Breath (wheezing). 30 Each 0    Naloxone (NARCAN) 4 MG/0.1ML Liquid One spray in one nostril for overdose and call 911. 1 Each 0    fluorouracil (EFUDEX) 5 % cream APPLY TOPICALLY TO THE AFFECTED AREA OF FACE TWICE DAILY IN THE MORNING AND IN THE EVENING FOR 4 TO 6 WEEKS      albuterol 108 (90 Base) MCG/ACT Aero Soln inhalation aerosol Inhale 2 Puffs every four hours as needed for Shortness of Breath. 1 Each 1    fluticasone (FLONASE) 50 MCG/ACT nasal spray Administer 1 Spray into affected nostril(S) every day.      cetirizine (ZYRTEC) 10 MG Tab Take 10 mg by mouth every day.      celecoxib (CELEBREX) 200 MG Cap Take 200 mg by mouth every morning.      clopidogrel (PLAVIX) 75 MG Tab       metoprolol SR (TOPROL XL) 50 MG TABLET SR 24 HR Take 50 mg by mouth every day.      atorvastatin (LIPITOR) 80 MG tablet Take 80 mg by mouth every evening.          Current Outpatient Medications on File Prior to Visit   Medication Sig Dispense Refill    lansoprazole (PREVACID) 30 MG CAPSULE DELAYED RELEASE TAKE 1 CAPSULE BY MOUTH EVERY DAY 90 Capsule 0    Dextromethorphan-guaiFENesin (MUCINEX DM)  MG TABLET SR 12 HR Take 1 Each by mouth 3 times a day as needed (cough). 30 Tablet 0    ipratropium-albuterol (DUONEB) 0.5-2.5 (3) MG/3ML nebulizer solution Take 3 mL by nebulization every 6 hours as needed for Shortness of Breath (wheezing). 30 Each 0    Naloxone (NARCAN) 4 MG/0.1ML Liquid One spray in one nostril for overdose and call 911. 1 Each 0    fluorouracil (EFUDEX) 5 % cream APPLY TOPICALLY TO THE AFFECTED AREA OF FACE TWICE DAILY IN THE MORNING AND IN THE EVENING FOR 4 TO 6 WEEKS      albuterol 108 (90 Base) MCG/ACT Aero Soln inhalation aerosol Inhale 2 Puffs every four hours as needed for Shortness of Breath. 1 Each 1    fluticasone (FLONASE) 50 MCG/ACT nasal spray Administer 1 Spray into affected nostril(S) every day.       cetirizine (ZYRTEC) 10 MG Tab Take 10 mg by mouth every day.      celecoxib (CELEBREX) 200 MG Cap Take 200 mg by mouth every morning.      clopidogrel (PLAVIX) 75 MG Tab       metoprolol SR (TOPROL XL) 50 MG TABLET SR 24 HR Take 50 mg by mouth every day.      atorvastatin (LIPITOR) 80 MG tablet Take 80 mg by mouth every evening.       No current facility-administered medications on file prior to visit.         Allergies:   No Known Allergies    Social Hx:   Social History     Socioeconomic History    Marital status:      Spouse name: Not on file    Number of children: Not on file    Years of education: Not on file    Highest education level: Not on file   Occupational History    Not on file   Tobacco Use    Smoking status: Never    Smokeless tobacco: Never   Vaping Use    Vaping Use: Never used   Substance and Sexual Activity    Alcohol use: Not Currently    Drug use: Not Currently    Sexual activity: Not Currently   Other Topics Concern    Not on file   Social History Narrative    Not on file     Social Determinants of Health     Financial Resource Strain: Not on file   Food Insecurity: Not on file   Transportation Needs: Not on file   Physical Activity: Not on file   Stress: Not on file   Social Connections: Not on file   Intimate Partner Violence: Not on file   Housing Stability: Not on file         EXAMINATION     Physical Exam:   Vitals: /76 (BP Location: Left arm, Patient Position: Sitting, BP Cuff Size: Adult)   Pulse 82   Temp 36.3 °C (97.3 °F) (Temporal)   Ht 1.524 m (5')   Wt 68 kg (150 lb)   SpO2 94%     Constitutional:   Body Habitus: Body mass index is 29.29 kg/m².  Cooperation: Fully cooperates with exam  Appearance: Well-groomed no disheveled    Respiratory-  breathing comfortable on room air, no audible wheezing  Cardiovascular- capillary refills less than 2 seconds. No lower extremity edema is noted.   Psychiatric- alert and oriented ×3. Normal affect.    MSK and Neuro:  "-      Thoracic/Lumbar Spine/Sacral Spine/Hips   There are no signs of infection around the injection sites.   decreased active range of motion with flexion, lateral flexion, and rotation bilaterally.   There is decreased active range of motion with lumbar extension.    There is  pain with lumbar extension.   There is pain with facet loading maneuver (extension rotation) with axial low back pain on the BILATERAL side(s)       Palpation:   No tenderness to palpation in midline at T1-T12 levels. No tenderness to palpation in the left and right of the midline T1-L5, NEGATIVE for tenderness to palpation to the para-midline region in the lower lumbar levels.  palpation over SI joint: negative bilaterally    palpation in hip or over the gluteus medius tendon insertion: negative bilaterally      Lumbar spine Special tests  Neuro tension  Straight leg test positive bilaterally    Slump test positive bilaterally      Key points for the international standards for neurological classification of spinal cord injury (ISNCSCI) to light touch.     Dermatome R L                                      L2 2 2   L3 2 2   L4 2 2   L5 2 2   S1 2 2   S2 2 2         Motor Exam Lower Extremities    ? Myotome R L   Hip flexion L2 5 5   Knee extension L3 5 5   Ankle dorsiflexion L4 5 5   Toe extension L5 5 5   Ankle plantarflexion S1 5 5           MEDICAL DECISION MAKING    DATA    Labs:   Lab Results   Component Value Date/Time    SODIUM 139 01/23/2024 02:14 AM    POTASSIUM 3.9 01/23/2024 02:14 AM    CHLORIDE 104 01/23/2024 02:14 AM    CO2 22 01/23/2024 02:14 AM    GLUCOSE 97 01/23/2024 02:14 AM    BUN 23 (H) 01/23/2024 02:14 AM    CREATININE 0.62 01/23/2024 02:14 AM        No results found for: \"PROTHROMBTM\", \"INR\"     Lab Results   Component Value Date/Time    WBC 11.2 (H) 01/23/2024 02:14 AM    RBC 4.24 01/23/2024 02:14 AM    HEMOGLOBIN 13.3 01/23/2024 02:14 AM    HEMATOCRIT 40.4 01/23/2024 02:14 AM    MCV 95.3 01/23/2024 02:14 AM    MCH " "31.4 2024 02:14 AM    MCHC 32.9 2024 02:14 AM    MPV 9.4 2024 02:14 AM    NEUTSPOLYS 60.00 2024 07:15 PM    LYMPHOCYTES 27.60 2024 07:15 PM    MONOCYTES 9.70 2024 07:15 PM    EOSINOPHILS 1.10 2024 07:15 PM    BASOPHILS 0.50 2024 07:15 PM        No results found for: \"HBA1C\"       Imaging:   I personally reviewed following images            I reviewed the following radiology reports          Results for orders placed during the hospital encounter of 24    MR-LUMBAR SPINE-W/O    Impression  1.  Degenerative disease as described above.  2.  There is severe right L4 foraminal stenosis with nerve root impingement.  3.  Infrarenal abdominal aortic aneurysm.                                Results for orders placed in visit on 24    DX-CHEST-2 VIEWS    Impression  NEGATIVE TWO VIEWS OF THE CHEST.                     Results for orders placed in visit on 10/04/23    DX-KNEE COMPLETE 4+ BILATERAL    Results for orders placed during the hospital encounter of 24    DX-LUMBAR SPINE-4+ VIEWS    Impression  1.  Multilevel disc and facet joint degenerative changes.  2.  Osteopenia without evidence displaced fracture.  3.  No evidence of dynamic instability.  4.  3.6 cm infrarenal abdominal aortic aneurysm.                        DIAGNOSIS   Visit Diagnoses     ICD-10-CM   1. Lumbar radiculopathy  M54.16   2. History of MI (myocardial infarction)  I25.2   3. S/P coronary artery stent placement  Z95.5   4. Lumbar spondylosis  M47.816   5. Lumbar degenerative disc disease  M51.36         ASSESSMENT and PLAN:     Chantelle Zhengmesfin  10/31/1927 female      Chantelle was seen today for back pain.    Diagnoses and all orders for this visit:    Lumbar radiculopathy  -     Referral to Physical Medicine Rehab    History of MI (myocardial infarction)    S/P coronary artery stent placement    Lumbar spondylosis    Lumbar degenerative disc disease        I reviewed the MRI " lumbar spine and there are no areas of high-grade central canal stenosis.  There is degenerative disc disease and facet arthropathy as well as foraminal stenosis.  The patient responded well to epidural steroid injections in the past.    I have ordered a lumbar interlaminar epidural steroid injection L4-5 versus L5-S1.    The risks benefits and alternatives to this procedure were discussed and the patient wishes to proceed with the procedure. Risks include but are not limited to damage to surrounding structures, infection, bleeding, worsening of pain which can be permanent, weakness which can be permanent. Benefits include pain relief, improved function. Alternatives includes not doing the procedure.      I also discussed case with the patient's daughter Gabriella who was at today's visit and assisted with the HPI.    Medications: DEA guidelines reviewed.  The patient will need clearance to be off of Plavix for 5 days prior to the procedure above    Follow up: After the above procedure    Thank you for allowing me to participate in the care of this patient. If you have any questions please not hesitate to contact me.             Please note that this dictation was created using voice recognition software. I have made every reasonable attempt to correct obvious errors but there may be errors of grammar and content that I may have overlooked prior to finalization of this note.      Enrique Louise MD  Interventional Spine and Sports Physiatry  Physical Medicine and Rehabilitation  [facility]

## 2024-04-04 NOTE — H&P (VIEW-ONLY)
Follow up patient note  Interventional spine and Pain  Physiatry (physical medicine and  Rehabilitation)       Chief complaint:   Chief Complaint   Patient presents with    Back Pain     Review Imaging          HISTORY    Please see new patient note by Dr Louise,  for more details.     HPI  Patient identification: Chantelle Sánchez , DONTA 10/31/1927,   With Diagnoses of Lumbar radiculopathy, History of MI (myocardial infarction), S/P coronary artery stent placement, Lumbar spondylosis, and Lumbar degenerative disc disease were pertinent to this visit.     Chronic bilateral low back pain rating to the bilateral buttocks 7 out of 10 intensity constant worse with standing walking bending lifting.  She has had a provider driven home exercise program and is attempted this.  She is tried medication management.  She cannot take NSAIDs because of Plavix.  Not on muscle relaxers because of age.       ROS Red Flags :   Fever, Chills, Sweats: Denies  Involuntary Weight Loss: Denies  Bowel/Bladder Incontinence: Denies  Saddle Anesthesia: Denies        PMHx:   Past Medical History:   Diagnosis Date    History of heart artery stent     History of heart attack     Hyperlipidemia     Hypertension     Osteoarthritis     (B)       PSHx:   Past Surgical History:   Procedure Laterality Date    CARPAL TUNNEL RELEASE      CATARACT EXTRACTION WITH IOL      KNEE ARTHROSCOPY      OTHER ORTHOPEDIC SURGERY      toe    RHINOPLASTY         Family history   History reviewed. No pertinent family history.      Medications:   Outpatient Medications Marked as Taking for the 24 encounter (Office Visit) with Enrique Louise M.D.   Medication Sig Dispense Refill    lansoprazole (PREVACID) 30 MG CAPSULE DELAYED RELEASE TAKE 1 CAPSULE BY MOUTH EVERY DAY 90 Capsule 0    Dextromethorphan-guaiFENesin (MUCINEX DM)  MG TABLET SR 12 HR Take 1 Each by mouth 3 times a day as needed (cough). 30 Tablet 0    ipratropium-albuterol (DUONEB) 0.5-2.5 (3)  MG/3ML nebulizer solution Take 3 mL by nebulization every 6 hours as needed for Shortness of Breath (wheezing). 30 Each 0    Naloxone (NARCAN) 4 MG/0.1ML Liquid One spray in one nostril for overdose and call 911. 1 Each 0    fluorouracil (EFUDEX) 5 % cream APPLY TOPICALLY TO THE AFFECTED AREA OF FACE TWICE DAILY IN THE MORNING AND IN THE EVENING FOR 4 TO 6 WEEKS      albuterol 108 (90 Base) MCG/ACT Aero Soln inhalation aerosol Inhale 2 Puffs every four hours as needed for Shortness of Breath. 1 Each 1    fluticasone (FLONASE) 50 MCG/ACT nasal spray Administer 1 Spray into affected nostril(S) every day.      cetirizine (ZYRTEC) 10 MG Tab Take 10 mg by mouth every day.      celecoxib (CELEBREX) 200 MG Cap Take 200 mg by mouth every morning.      clopidogrel (PLAVIX) 75 MG Tab       metoprolol SR (TOPROL XL) 50 MG TABLET SR 24 HR Take 50 mg by mouth every day.      atorvastatin (LIPITOR) 80 MG tablet Take 80 mg by mouth every evening.          Current Outpatient Medications on File Prior to Visit   Medication Sig Dispense Refill    lansoprazole (PREVACID) 30 MG CAPSULE DELAYED RELEASE TAKE 1 CAPSULE BY MOUTH EVERY DAY 90 Capsule 0    Dextromethorphan-guaiFENesin (MUCINEX DM)  MG TABLET SR 12 HR Take 1 Each by mouth 3 times a day as needed (cough). 30 Tablet 0    ipratropium-albuterol (DUONEB) 0.5-2.5 (3) MG/3ML nebulizer solution Take 3 mL by nebulization every 6 hours as needed for Shortness of Breath (wheezing). 30 Each 0    Naloxone (NARCAN) 4 MG/0.1ML Liquid One spray in one nostril for overdose and call 911. 1 Each 0    fluorouracil (EFUDEX) 5 % cream APPLY TOPICALLY TO THE AFFECTED AREA OF FACE TWICE DAILY IN THE MORNING AND IN THE EVENING FOR 4 TO 6 WEEKS      albuterol 108 (90 Base) MCG/ACT Aero Soln inhalation aerosol Inhale 2 Puffs every four hours as needed for Shortness of Breath. 1 Each 1    fluticasone (FLONASE) 50 MCG/ACT nasal spray Administer 1 Spray into affected nostril(S) every day.       cetirizine (ZYRTEC) 10 MG Tab Take 10 mg by mouth every day.      celecoxib (CELEBREX) 200 MG Cap Take 200 mg by mouth every morning.      clopidogrel (PLAVIX) 75 MG Tab       metoprolol SR (TOPROL XL) 50 MG TABLET SR 24 HR Take 50 mg by mouth every day.      atorvastatin (LIPITOR) 80 MG tablet Take 80 mg by mouth every evening.       No current facility-administered medications on file prior to visit.         Allergies:   No Known Allergies    Social Hx:   Social History     Socioeconomic History    Marital status:      Spouse name: Not on file    Number of children: Not on file    Years of education: Not on file    Highest education level: Not on file   Occupational History    Not on file   Tobacco Use    Smoking status: Never    Smokeless tobacco: Never   Vaping Use    Vaping Use: Never used   Substance and Sexual Activity    Alcohol use: Not Currently    Drug use: Not Currently    Sexual activity: Not Currently   Other Topics Concern    Not on file   Social History Narrative    Not on file     Social Determinants of Health     Financial Resource Strain: Not on file   Food Insecurity: Not on file   Transportation Needs: Not on file   Physical Activity: Not on file   Stress: Not on file   Social Connections: Not on file   Intimate Partner Violence: Not on file   Housing Stability: Not on file         EXAMINATION     Physical Exam:   Vitals: /76 (BP Location: Left arm, Patient Position: Sitting, BP Cuff Size: Adult)   Pulse 82   Temp 36.3 °C (97.3 °F) (Temporal)   Ht 1.524 m (5')   Wt 68 kg (150 lb)   SpO2 94%     Constitutional:   Body Habitus: Body mass index is 29.29 kg/m².  Cooperation: Fully cooperates with exam  Appearance: Well-groomed no disheveled    Respiratory-  breathing comfortable on room air, no audible wheezing  Cardiovascular- capillary refills less than 2 seconds. No lower extremity edema is noted.   Psychiatric- alert and oriented ×3. Normal affect.    MSK and Neuro:  "-      Thoracic/Lumbar Spine/Sacral Spine/Hips   There are no signs of infection around the injection sites.   decreased active range of motion with flexion, lateral flexion, and rotation bilaterally.   There is decreased active range of motion with lumbar extension.    There is  pain with lumbar extension.   There is pain with facet loading maneuver (extension rotation) with axial low back pain on the BILATERAL side(s)       Palpation:   No tenderness to palpation in midline at T1-T12 levels. No tenderness to palpation in the left and right of the midline T1-L5, NEGATIVE for tenderness to palpation to the para-midline region in the lower lumbar levels.  palpation over SI joint: negative bilaterally    palpation in hip or over the gluteus medius tendon insertion: negative bilaterally      Lumbar spine Special tests  Neuro tension  Straight leg test positive bilaterally    Slump test positive bilaterally      Key points for the international standards for neurological classification of spinal cord injury (ISNCSCI) to light touch.     Dermatome R L                                      L2 2 2   L3 2 2   L4 2 2   L5 2 2   S1 2 2   S2 2 2         Motor Exam Lower Extremities    ? Myotome R L   Hip flexion L2 5 5   Knee extension L3 5 5   Ankle dorsiflexion L4 5 5   Toe extension L5 5 5   Ankle plantarflexion S1 5 5           MEDICAL DECISION MAKING    DATA    Labs:   Lab Results   Component Value Date/Time    SODIUM 139 01/23/2024 02:14 AM    POTASSIUM 3.9 01/23/2024 02:14 AM    CHLORIDE 104 01/23/2024 02:14 AM    CO2 22 01/23/2024 02:14 AM    GLUCOSE 97 01/23/2024 02:14 AM    BUN 23 (H) 01/23/2024 02:14 AM    CREATININE 0.62 01/23/2024 02:14 AM        No results found for: \"PROTHROMBTM\", \"INR\"     Lab Results   Component Value Date/Time    WBC 11.2 (H) 01/23/2024 02:14 AM    RBC 4.24 01/23/2024 02:14 AM    HEMOGLOBIN 13.3 01/23/2024 02:14 AM    HEMATOCRIT 40.4 01/23/2024 02:14 AM    MCV 95.3 01/23/2024 02:14 AM    MCH " "31.4 2024 02:14 AM    MCHC 32.9 2024 02:14 AM    MPV 9.4 2024 02:14 AM    NEUTSPOLYS 60.00 2024 07:15 PM    LYMPHOCYTES 27.60 2024 07:15 PM    MONOCYTES 9.70 2024 07:15 PM    EOSINOPHILS 1.10 2024 07:15 PM    BASOPHILS 0.50 2024 07:15 PM        No results found for: \"HBA1C\"       Imaging:   I personally reviewed following images            I reviewed the following radiology reports          Results for orders placed during the hospital encounter of 24    MR-LUMBAR SPINE-W/O    Impression  1.  Degenerative disease as described above.  2.  There is severe right L4 foraminal stenosis with nerve root impingement.  3.  Infrarenal abdominal aortic aneurysm.                                Results for orders placed in visit on 24    DX-CHEST-2 VIEWS    Impression  NEGATIVE TWO VIEWS OF THE CHEST.                     Results for orders placed in visit on 10/04/23    DX-KNEE COMPLETE 4+ BILATERAL    Results for orders placed during the hospital encounter of 24    DX-LUMBAR SPINE-4+ VIEWS    Impression  1.  Multilevel disc and facet joint degenerative changes.  2.  Osteopenia without evidence displaced fracture.  3.  No evidence of dynamic instability.  4.  3.6 cm infrarenal abdominal aortic aneurysm.                        DIAGNOSIS   Visit Diagnoses     ICD-10-CM   1. Lumbar radiculopathy  M54.16   2. History of MI (myocardial infarction)  I25.2   3. S/P coronary artery stent placement  Z95.5   4. Lumbar spondylosis  M47.816   5. Lumbar degenerative disc disease  M51.36         ASSESSMENT and PLAN:     Chantelle Zhengmesfin  10/31/1927 female      Chantelle was seen today for back pain.    Diagnoses and all orders for this visit:    Lumbar radiculopathy  -     Referral to Physical Medicine Rehab    History of MI (myocardial infarction)    S/P coronary artery stent placement    Lumbar spondylosis    Lumbar degenerative disc disease        I reviewed the MRI " lumbar spine and there are no areas of high-grade central canal stenosis.  There is degenerative disc disease and facet arthropathy as well as foraminal stenosis.  The patient responded well to epidural steroid injections in the past.    I have ordered a lumbar interlaminar epidural steroid injection L4-5 versus L5-S1.    The risks benefits and alternatives to this procedure were discussed and the patient wishes to proceed with the procedure. Risks include but are not limited to damage to surrounding structures, infection, bleeding, worsening of pain which can be permanent, weakness which can be permanent. Benefits include pain relief, improved function. Alternatives includes not doing the procedure.      I also discussed case with the patient's daughter Gabreilla who was at today's visit and assisted with the HPI.    Medications: DEA guidelines reviewed.  The patient will need clearance to be off of Plavix for 5 days prior to the procedure above    Follow up: After the above procedure    Thank you for allowing me to participate in the care of this patient. If you have any questions please not hesitate to contact me.             Please note that this dictation was created using voice recognition software. I have made every reasonable attempt to correct obvious errors but there may be errors of grammar and content that I may have overlooked prior to finalization of this note.      Enrique Louise MD  Interventional Spine and Sports Physiatry  Physical Medicine and Rehabilitation  [facility]

## 2024-04-08 ENCOUNTER — APPOINTMENT (RX ONLY)
Dept: URBAN - METROPOLITAN AREA CLINIC 6 | Facility: CLINIC | Age: 89
Setting detail: DERMATOLOGY
End: 2024-04-08

## 2024-04-08 DIAGNOSIS — Z71.89 OTHER SPECIFIED COUNSELING: ICD-10-CM

## 2024-04-08 DIAGNOSIS — Z86.007 PERSONAL HISTORY OF IN-SITU NEOPLASM OF SKIN: ICD-10-CM | Status: STABLE

## 2024-04-08 DIAGNOSIS — L57.0 ACTINIC KERATOSIS: ICD-10-CM

## 2024-04-08 PROCEDURE — ? LIQUID NITROGEN

## 2024-04-08 PROCEDURE — ? COUNSELING

## 2024-04-08 PROCEDURE — 99212 OFFICE O/P EST SF 10 MIN: CPT | Mod: 25

## 2024-04-08 PROCEDURE — ? SUNSCREEN TREATMENT REGIMEN

## 2024-04-08 PROCEDURE — ? ADDITIONAL NOTES

## 2024-04-08 PROCEDURE — 17000 DESTRUCT PREMALG LESION: CPT

## 2024-04-08 PROCEDURE — ? OBSERVATION

## 2024-04-08 ASSESSMENT — LOCATION SIMPLE DESCRIPTION DERM
LOCATION SIMPLE: LEFT HAND
LOCATION SIMPLE: LEFT FOREHEAD
LOCATION SIMPLE: RIGHT HAND
LOCATION SIMPLE: RIGHT CHEEK
LOCATION SIMPLE: RIGHT EYEBROW

## 2024-04-08 ASSESSMENT — LOCATION DETAILED DESCRIPTION DERM
LOCATION DETAILED: RIGHT CENTRAL MALAR CHEEK
LOCATION DETAILED: RIGHT MEDIAL EYEBROW
LOCATION DETAILED: RIGHT RADIAL DORSAL HAND
LOCATION DETAILED: LEFT RADIAL DORSAL HAND
LOCATION DETAILED: LEFT INFERIOR FOREHEAD

## 2024-04-08 ASSESSMENT — LOCATION ZONE DERM
LOCATION ZONE: HAND
LOCATION ZONE: FACE

## 2024-04-08 NOTE — PROCEDURE: LIQUID NITROGEN
Show Applicator Variable?: Yes
Render Note In Bullet Format When Appropriate: No
Post-Care Instructions: I reviewed with the patient in detail post-care instructions. Patient is to wear sunprotection, and avoid picking at any of the treated lesions. Pt may apply Vaseline to crusted or scabbing areas.
Duration Of Freeze Thaw-Cycle (Seconds): 10
Detail Level: Detailed
Number Of Freeze-Thaw Cycles: 2 freeze-thaw cycles
Consent: The patient's verbal consent was obtained including but not limited to risks of crusting, scabbing, blistering, scarring, darker or lighter pigmentary change, recurrence, incomplete removal and infection.

## 2024-04-08 NOTE — PROCEDURE: ADDITIONAL NOTES
Render Risk Assessment In Note?: no
Detail Level: Simple
Additional Notes: [id number]- deferred treatment today, will monitor

## 2024-04-10 ENCOUNTER — TELEPHONE (OUTPATIENT)
Dept: CARDIOLOGY | Facility: MEDICAL CENTER | Age: 89
End: 2024-04-10
Payer: MEDICARE

## 2024-04-11 ENCOUNTER — OFFICE VISIT (OUTPATIENT)
Dept: CARDIOLOGY | Facility: MEDICAL CENTER | Age: 89
End: 2024-04-11
Attending: HOSPITALIST
Payer: MEDICARE

## 2024-04-11 VITALS
SYSTOLIC BLOOD PRESSURE: 122 MMHG | BODY MASS INDEX: 27.68 KG/M2 | RESPIRATION RATE: 16 BRPM | HEART RATE: 73 BPM | HEIGHT: 60 IN | WEIGHT: 141 LBS | DIASTOLIC BLOOD PRESSURE: 80 MMHG | OXYGEN SATURATION: 93 %

## 2024-04-11 DIAGNOSIS — E78.5 HYPERLIPIDEMIA, UNSPECIFIED HYPERLIPIDEMIA TYPE: ICD-10-CM

## 2024-04-11 DIAGNOSIS — I47.20 VENTRICULAR TACHYCARDIA (HCC): ICD-10-CM

## 2024-04-11 DIAGNOSIS — I35.1 AORTIC VALVE INSUFFICIENCY, ETIOLOGY OF CARDIAC VALVE DISEASE UNSPECIFIED: ICD-10-CM

## 2024-04-11 DIAGNOSIS — I72.9 ANEURYSM (HCC): ICD-10-CM

## 2024-04-11 DIAGNOSIS — I25.2 HISTORY OF MI (MYOCARDIAL INFARCTION): ICD-10-CM

## 2024-04-11 DIAGNOSIS — Z01.810 PREOPERATIVE CARDIOVASCULAR EXAMINATION: ICD-10-CM

## 2024-04-11 DIAGNOSIS — Z95.5 S/P CORONARY ARTERY STENT PLACEMENT: ICD-10-CM

## 2024-04-11 DIAGNOSIS — I25.83 CORONARY ARTERY DISEASE DUE TO LIPID RICH PLAQUE: ICD-10-CM

## 2024-04-11 DIAGNOSIS — R55 SYNCOPE AND COLLAPSE: ICD-10-CM

## 2024-04-11 DIAGNOSIS — I25.10 CORONARY ARTERY DISEASE DUE TO LIPID RICH PLAQUE: ICD-10-CM

## 2024-04-11 PROBLEM — Z98.890 S/P ABLATION OF ATRIAL FIBRILLATION: Status: RESOLVED | Noted: 2023-09-11 | Resolved: 2024-04-11

## 2024-04-11 PROBLEM — Z86.79 S/P ABLATION OF ATRIAL FIBRILLATION: Status: RESOLVED | Noted: 2023-09-11 | Resolved: 2024-04-11

## 2024-04-11 LAB — EKG IMPRESSION: NORMAL

## 2024-04-11 PROCEDURE — 99205 OFFICE O/P NEW HI 60 MIN: CPT | Performed by: INTERNAL MEDICINE

## 2024-04-11 PROCEDURE — 3079F DIAST BP 80-89 MM HG: CPT | Performed by: INTERNAL MEDICINE

## 2024-04-11 PROCEDURE — 93005 ELECTROCARDIOGRAM TRACING: CPT | Performed by: INTERNAL MEDICINE

## 2024-04-11 PROCEDURE — 93010 ELECTROCARDIOGRAM REPORT: CPT | Performed by: INTERNAL MEDICINE

## 2024-04-11 PROCEDURE — 3074F SYST BP LT 130 MM HG: CPT | Performed by: INTERNAL MEDICINE

## 2024-04-11 PROCEDURE — 99213 OFFICE O/P EST LOW 20 MIN: CPT | Performed by: INTERNAL MEDICINE

## 2024-04-11 ASSESSMENT — ENCOUNTER SYMPTOMS
SYNCOPE: 0
ABDOMINAL PAIN: 0
HEARTBURN: 0
IRREGULAR HEARTBEAT: 0
VOMITING: 0
ALTERED MENTAL STATUS: 0
FEVER: 0
ORTHOPNEA: 0
DIZZINESS: 0
DIARRHEA: 0
SHORTNESS OF BREATH: 0
NEAR-SYNCOPE: 0
FLANK PAIN: 0
COUGH: 0
PND: 0
NAUSEA: 0
WEIGHT GAIN: 0
WEIGHT LOSS: 0
BACK PAIN: 0
BLURRED VISION: 0
DEPRESSION: 0
DECREASED APPETITE: 0
PALPITATIONS: 0
CONSTIPATION: 0
DYSPNEA ON EXERTION: 0
CLAUDICATION: 0

## 2024-04-11 ASSESSMENT — FIBROSIS 4 INDEX: FIB4 SCORE: 1.71

## 2024-04-12 ENCOUNTER — TELEPHONE (OUTPATIENT)
Dept: CARDIOLOGY | Facility: MEDICAL CENTER | Age: 89
End: 2024-04-12

## 2024-04-12 ENCOUNTER — NON-PROVIDER VISIT (OUTPATIENT)
Dept: CARDIOLOGY | Facility: MEDICAL CENTER | Age: 89
End: 2024-04-12
Attending: INTERNAL MEDICINE
Payer: MEDICARE

## 2024-04-12 DIAGNOSIS — I47.20 VENTRICULAR TACHYCARDIA (HCC): ICD-10-CM

## 2024-04-12 NOTE — LETTER
PROCEDURE/SURGERY CLEARANCE FORM      Encounter Date: 4/12/2024    Patient: Chantelle Sánchez  YOB: 1927    CARDIOLOGIST:  Davion Wilcox M.D.    REFERRING DOCTOR:  No ref. provider found    The above patient is cleared to have the following procedure/surgery: Epidural steroid Injection                                            Additional comments: Preoperative cardiac assessment - no active cardiac complaints. Prior syncope without recurrence unclear etiology. Stable EKG. METS >4. RCRI 1. No further cardiac testing would modify perioperative risk for epidural. Hold plavix seven days prior and resume when hemostasis achieved.           Electronically signed        MD Signature   Davion Wilcox M.D.

## 2024-04-12 NOTE — PROGRESS NOTES
Home enrollment completed in the 14 day Zio XT Holter monitor per Davion Wilcox MD.  Monitor will be shipped to patient via [email or web address], pending EOS.

## 2024-04-12 NOTE — TELEPHONE ENCOUNTER
VR        Caller: Alexandra ([facility] Clearance office)    Office Name, phone number, fax number: [facility]  Fax# [phone]    Fax clearance to [phone]    Procedure Name: Epidural steroid Injection    Procedure Scheduled Date: 4/23/24  (Clearance was faxed and in media dated 4/10/24)    Callback Number: see below      Thank you    -Alonso OBANDO

## 2024-04-15 NOTE — TELEPHONE ENCOUNTER
"Last OV: 4/11/2024  Proposed Surgery:  Epidural steroid Injection   Surgery Date: 4/23/2024  Requesting Office Name: [facility]   Fax Number: [phone]   Preference of Location (default is surgery center unless specified by Cardiologist or DAVID)  Prior Clearance Addressed: Yes \"Preoperative cardiac assessment - no active cardiac complaints. Prior syncope without recurrence unclear etiology. Stable EKG. METS >4. RCRI 1. No further cardiac testing would modify perioperative risk for epidural. Hold plavix seven days prior and resume when hemostasis achieved. \"    History (cardiac history):   Past Medical History:   Diagnosis Date    History of heart artery stent     History of heart attack     Hyperlipidemia     Hypertension     Osteoarthritis     (B)             Surgical Clearance Letter Sent: YES   **Scan clearance request letter into [facility].**   "

## 2024-04-23 ENCOUNTER — HOSPITAL ENCOUNTER (OUTPATIENT)
Dept: RADIOLOGY | Facility: REHABILITATION | Age: 89
End: 2024-04-23
Attending: PHYSICAL MEDICINE & REHABILITATION

## 2024-04-23 ENCOUNTER — HOSPITAL ENCOUNTER (OUTPATIENT)
Facility: REHABILITATION | Age: 89
End: 2024-04-23
Attending: PHYSICAL MEDICINE & REHABILITATION | Admitting: PHYSICAL MEDICINE & REHABILITATION
Payer: MEDICARE

## 2024-04-23 VITALS
RESPIRATION RATE: 18 BRPM | TEMPERATURE: 97.9 F | HEART RATE: 72 BPM | SYSTOLIC BLOOD PRESSURE: 150 MMHG | BODY MASS INDEX: 27.66 KG/M2 | WEIGHT: 140.87 LBS | DIASTOLIC BLOOD PRESSURE: 80 MMHG | OXYGEN SATURATION: 94 % | HEIGHT: 60 IN

## 2024-04-23 PROCEDURE — 700111 HCHG RX REV CODE 636 W/ 250 OVERRIDE (IP): Mod: JZ

## 2024-04-23 PROCEDURE — 62321 NJX INTERLAMINAR CRV/THRC: CPT

## 2024-04-23 PROCEDURE — 700117 HCHG RX CONTRAST REV CODE 255

## 2024-04-23 RX ORDER — LIDOCAINE HYDROCHLORIDE 10 MG/ML
INJECTION, SOLUTION EPIDURAL; INFILTRATION; INTRACAUDAL; PERINEURAL
Status: COMPLETED
Start: 2024-04-23 | End: 2024-04-23

## 2024-04-23 RX ORDER — DEXAMETHASONE SODIUM PHOSPHATE 10 MG/ML
INJECTION, SOLUTION INTRAMUSCULAR; INTRAVENOUS
Status: COMPLETED
Start: 2024-04-23 | End: 2024-04-23

## 2024-04-23 RX ADMIN — DEXAMETHASONE SODIUM PHOSPHATE 10 MG: 10 INJECTION, SOLUTION INTRAMUSCULAR; INTRAVENOUS at 09:18

## 2024-04-23 RX ADMIN — IOHEXOL 5 ML: 240 INJECTION, SOLUTION INTRATHECAL; INTRAVASCULAR; INTRAVENOUS; ORAL at 09:17

## 2024-04-23 RX ADMIN — LIDOCAINE HYDROCHLORIDE 10 ML: 10 INJECTION, SOLUTION EPIDURAL; INFILTRATION; INTRACAUDAL; PERINEURAL at 09:17

## 2024-04-23 ASSESSMENT — FIBROSIS 4 INDEX: FIB4 SCORE: 1.71

## 2024-04-23 ASSESSMENT — PAIN DESCRIPTION - PAIN TYPE: TYPE: CHRONIC PAIN

## 2024-04-23 NOTE — INTERVAL H&P NOTE
H&P reviewed. The patient was examined and there are no changes to the H&P     Lungs clear to auscultation bilaterally.  No abdominal tenderness.  Heart regular rate and rhythm.  Vitals reviewed.    Proceed with the originally scheduled procedure.  The risks, benefits and alternatives were discussed.  The patient understands these.    Pre-Op Diagnosis Codes:     * Lumbar radiculopathy [M54.16]  Procedure(s):  Lumbar L4-5 versus L5-S1 interlaminar epidural steroid injection…..Note: Patient needs clearance to be off of Plavix for 5 days prior to the procedure    Enrique Louise MD  Physical Medicine and Rehabilitation  Interventional Spine and Sports Physiatry  [facility]

## 2024-04-23 NOTE — OP REPORT
Date of Service: 4/23/2024    Physician/s: Enrique Louise MD    Pre-operative Diagnosis: Lumbar radiculopathy     Post-operative Diagnosis: Lumbar radiculopathy,     Procedure: Lumbar  L5-S1   interlaminar epidural steroid injection     Description of procedure:    The risks, benefits, and alternatives of the procedure were reviewed and discussed with the patient.  Written informed consent was freely obtained. A pre-procedural time-out was conducted by the physician verifying patient’s identity, procedure to be performed, procedure site and side, and allergy verification. Appropriate equipment was determined to be in place for the procedure.         In the fluoroscopy suite the patient was placed in a prone position, a pillow placed underneath their umbilicus. The skin was prepped and draped in the usual sterile fashion. The fluoroscope was placed over the lumbar spine at the appropriate injection AP angle view, and the target for injection was marked. A 27g needle was placed into the marked site, and 2mL of 1% Lidocaine was injected subcutaneously into the epidermal and dermal layers. The needle was removed. A 20g Tuohy needle was then placed and advanced under fluoroscopic guidance into the  L5-S1  interlaminar space utilizing the initial position AP view and a lateral view to ensure proper location of the needle tip at all times. A loss of resistance technique was used to guide the needle into the epidural space in a contralateral oblique fluoroscopic view. In the AP and lateral views, contrast dye was used to highlight the epidural space spread while the fluoroscope was running live. Following negative aspiration, 1mL of 1% lidocaine,  preservative free with 1mL of 10mg/ml dexamethasone, 1mL sterile saline was then injected, and the needle was removed intact after restyleted. The patient's back was covered with a 4x4 gauze, the area was cleansed with sterile normal saline, and a dressing was applied. There were  no complications noted.     The patient had 80% pain relief postprocedure  Preprocedural pain: 7/10 NRS  Postprocedural pain: 1/10 NRS    The patient was then evaluated post-procedure, and was hemodynamically stable prior to leaving the post-operative care unit.     Follow-up as scheduled    Enrique Louise MD      CPT  Interlaminar epidural injection - lumbar or sacral (caudal): 13542

## 2024-04-23 NOTE — OR SURGEON
Immediate Post OP Note    Pre-Op Diagnosis Codes:     * Lumbar radiculopathy [M54.16]      Post-Op Diagnosis Codes:     * Lumbar radiculopathy [M54.16]      Procedure(s):  Lumbar L5-S1 interlaminar epidural steroid injection….     - Wound Class: Clean    Surgeon(s):  Enrique Louise M.D.    Anesthesiologist/Type of Anesthesia:  No anesthesia staff entered./Local    Surgical Staff:  Circulator: Shannan Tran R.N.  Scrub Person: Chichi Osman  Radiology Technologist: Reanna Whitney    Specimens removed if any:  * No specimens in log *    Estimated Blood Loss: None    Findings: None    Complications: None        4/23/2024 9:22 AM Enrique Louise M.D.

## 2024-04-23 NOTE — PROGRESS NOTES
0818 Pt arrived to pre-procedure area. Procedure & plan for recovery reviewed, site confirmed, & consent signed. Allergies & current medications verified. Appointed  waiting in lobby. Printed discharge instructions discussed & signed. Pt denies taking NSAIDS or anticoagulants in the last 5 days. MD to bedside prior to procedure.     0924 Pt to recovery area s/p MITCHELL & updates received from procedure RN. Pt reports relief in pain at this time. Ice pack given for home care.     0957 Pt ambulates with cane without difficulty & meets DC criteria. RN assisted pt off unit to appointed .

## 2024-04-25 ENCOUNTER — TELEPHONE (OUTPATIENT)
Dept: PHYSICAL MEDICINE AND REHAB | Facility: MEDICAL CENTER | Age: 89
End: 2024-04-25
Payer: MEDICARE

## 2024-04-25 NOTE — TELEPHONE ENCOUNTER
Called for Post -SP check up: Lumbar L4-5 versus L5-S1 interlaminar epidural steroid injection     Change in pain: Yes    Concerns? No    Confirmed [facility] appt? Yes

## 2024-05-21 ENCOUNTER — APPOINTMENT (OUTPATIENT)
Dept: PHYSICAL THERAPY | Facility: REHABILITATION | Age: 89
End: 2024-05-21
Attending: PHYSICAL MEDICINE & REHABILITATION
Payer: MEDICARE

## 2024-05-23 ENCOUNTER — TELEPHONE (OUTPATIENT)
Dept: CARDIOLOGY | Facility: MEDICAL CENTER | Age: 89
End: 2024-05-23
Payer: MEDICARE

## 2024-05-23 NOTE — TELEPHONE ENCOUNTER
DmitriyoXT EOS to VR's nurse, Dana, on 5/23/2024  Monitor noted:  2 runs of SVT,  with an avg rate of 117 BPM  Sinus rhythm,  with an avg rate of 73 BPM  No patient events recorded

## 2024-05-28 ENCOUNTER — APPOINTMENT (OUTPATIENT)
Dept: PHYSICAL THERAPY | Facility: REHABILITATION | Age: 89
End: 2024-05-28
Attending: PHYSICAL MEDICINE & REHABILITATION
Payer: MEDICARE

## 2024-06-04 ENCOUNTER — APPOINTMENT (OUTPATIENT)
Dept: PHYSICAL MEDICINE AND REHAB | Facility: MEDICAL CENTER | Age: 89
End: 2024-06-04
Payer: MEDICARE

## 2024-06-04 ENCOUNTER — APPOINTMENT (OUTPATIENT)
Dept: PHYSICAL THERAPY | Facility: REHABILITATION | Age: 89
End: 2024-06-04
Attending: PHYSICAL MEDICINE & REHABILITATION
Payer: MEDICARE

## 2024-06-04 VITALS
OXYGEN SATURATION: 99 % | DIASTOLIC BLOOD PRESSURE: 76 MMHG | HEART RATE: 59 BPM | TEMPERATURE: 97.2 F | SYSTOLIC BLOOD PRESSURE: 122 MMHG | HEIGHT: 60 IN | BODY MASS INDEX: 27.48 KG/M2 | WEIGHT: 140 LBS

## 2024-06-04 DIAGNOSIS — I25.2 HISTORY OF MI (MYOCARDIAL INFARCTION): ICD-10-CM

## 2024-06-04 DIAGNOSIS — M51.36 LUMBAR DEGENERATIVE DISC DISEASE: ICD-10-CM

## 2024-06-04 DIAGNOSIS — M47.816 LUMBAR SPONDYLOSIS: ICD-10-CM

## 2024-06-04 DIAGNOSIS — G89.29 CHRONIC BILATERAL LOW BACK PAIN WITHOUT SCIATICA: ICD-10-CM

## 2024-06-04 DIAGNOSIS — Z95.5 S/P CORONARY ARTERY STENT PLACEMENT: ICD-10-CM

## 2024-06-04 DIAGNOSIS — M54.50 CHRONIC BILATERAL LOW BACK PAIN WITHOUT SCIATICA: ICD-10-CM

## 2024-06-04 DIAGNOSIS — M54.16 LUMBAR RADICULOPATHY: ICD-10-CM

## 2024-06-04 PROCEDURE — G2211 COMPLEX E/M VISIT ADD ON: HCPCS | Performed by: PHYSICAL MEDICINE & REHABILITATION

## 2024-06-04 PROCEDURE — 99213 OFFICE O/P EST LOW 20 MIN: CPT | Performed by: PHYSICAL MEDICINE & REHABILITATION

## 2024-06-04 PROCEDURE — 1126F AMNT PAIN NOTED NONE PRSNT: CPT | Performed by: PHYSICAL MEDICINE & REHABILITATION

## 2024-06-04 PROCEDURE — 3074F SYST BP LT 130 MM HG: CPT | Performed by: PHYSICAL MEDICINE & REHABILITATION

## 2024-06-04 PROCEDURE — 3078F DIAST BP <80 MM HG: CPT | Performed by: PHYSICAL MEDICINE & REHABILITATION

## 2024-06-04 ASSESSMENT — PAIN SCALES - GENERAL: PAINLEVEL: NO PAIN

## 2024-06-04 ASSESSMENT — PATIENT HEALTH QUESTIONNAIRE - PHQ9
5. POOR APPETITE OR OVEREATING: 0 - NOT AT ALL
SUM OF ALL RESPONSES TO PHQ QUESTIONS 1-9: 5
CLINICAL INTERPRETATION OF PHQ2 SCORE: 1

## 2024-06-04 ASSESSMENT — FIBROSIS 4 INDEX: FIB4 SCORE: 1.71

## 2024-06-04 NOTE — PROGRESS NOTES
Follow up patient note  Interventional spine and Pain  Physiatry (physical medicine and  Rehabilitation)       Chief complaint:   Chief Complaint   Patient presents with    Follow-Up     Back pain          HISTORY    Please see new patient note by Dr Louise,  for more details.     HPI  Patient identification: Chantelle Sánchez ,  10/31/1927,   With Diagnoses of Lumbar radiculopathy, Lumbar spondylosis, Lumbar degenerative disc disease, Chronic bilateral low back pain without sciatica, History of MI (myocardial infarction), and S/P coronary artery stent placement were pertinent to this visit.     Status post lumbar L5-S1 interlaminar epidural steroid injection which I performed on 2024 with 90% pain relief overall.  Preprocedure pain 7/10 NRS postprocedure pain 1/10 NRS.  The patient is very happy with the pain relief.  She can more easily accomplish ADLs.  She can more easily do bed mobility and lower body dressing.  She denies radiating pain.  Denies numbness tingling or weakness.         ROS Red Flags :   Fever, Chills, Sweats: Denies  Involuntary Weight Loss: Denies  Bowel/Bladder Incontinence: Denies  Saddle Anesthesia: Denies        PMHx:   Past Medical History:   Diagnosis Date    History of heart artery stent     History of heart attack     Hyperlipidemia     Hypertension     Osteoarthritis     (B)       PSHx:   Past Surgical History:   Procedure Laterality Date    TN INJ LUMBAR/SACRAL,W/ IMAGING N/A 2024    Procedure: Lumbar L5-S1 interlaminar epidural steroid injection…..Note: Patient needs clearance to be off of Plavix for 5 days prior to the procedure;  Surgeon: Enrique Louise M.D.;  [facility];  Service: Pain Management    CARPAL TUNNEL RELEASE      CATARACT EXTRACTION WITH IOL      KNEE ARTHROSCOPY      OTHER ORTHOPEDIC SURGERY      toe    RHINOPLASTY         Family history   History reviewed. No pertinent family history.      Medications:   Outpatient  Medications Marked as Taking for the 6/4/24 encounter (Office Visit) with Enrique Louise M.D.   Medication Sig Dispense Refill    traMADol (ULTRAM) 50 MG Tab Take 1 Tablet by mouth 2 times a day as needed for Moderate Pain for up to 30 days. 60 Tablet 0    lansoprazole (PREVACID) 30 MG CAPSULE DELAYED RELEASE TAKE 1 CAPSULE BY MOUTH EVERY DAY 90 Capsule 0    Dextromethorphan-guaiFENesin (MUCINEX DM)  MG TABLET SR 12 HR Take 1 Each by mouth 3 times a day as needed (cough). 30 Tablet 0    ipratropium-albuterol (DUONEB) 0.5-2.5 (3) MG/3ML nebulizer solution Take 3 mL by nebulization every 6 hours as needed for Shortness of Breath (wheezing). 30 Each 0    Naloxone (NARCAN) 4 MG/0.1ML Liquid One spray in one nostril for overdose and call 911. 1 Each 0    albuterol 108 (90 Base) MCG/ACT Aero Soln inhalation aerosol Inhale 2 Puffs every four hours as needed for Shortness of Breath. 1 Each 1    fluticasone (FLONASE) 50 MCG/ACT nasal spray Administer 1 Spray into affected nostril(S) every day.      cetirizine (ZYRTEC) 10 MG Tab Take 10 mg by mouth every day.      celecoxib (CELEBREX) 200 MG Cap Take 200 mg by mouth every morning.      clopidogrel (PLAVIX) 75 MG Tab       metoprolol SR (TOPROL XL) 50 MG TABLET SR 24 HR Take 50 mg by mouth every day.      atorvastatin (LIPITOR) 80 MG tablet Take 80 mg by mouth every evening.          Current Outpatient Medications on File Prior to Visit   Medication Sig Dispense Refill    traMADol (ULTRAM) 50 MG Tab Take 1 Tablet by mouth 2 times a day as needed for Moderate Pain for up to 30 days. 60 Tablet 0    lansoprazole (PREVACID) 30 MG CAPSULE DELAYED RELEASE TAKE 1 CAPSULE BY MOUTH EVERY DAY 90 Capsule 0    Dextromethorphan-guaiFENesin (MUCINEX DM)  MG TABLET SR 12 HR Take 1 Each by mouth 3 times a day as needed (cough). 30 Tablet 0    ipratropium-albuterol (DUONEB) 0.5-2.5 (3) MG/3ML nebulizer solution Take 3 mL by nebulization every 6 hours as needed for Shortness of  Breath (wheezing). 30 Each 0    Naloxone (NARCAN) 4 MG/0.1ML Liquid One spray in one nostril for overdose and call 911. 1 Each 0    albuterol 108 (90 Base) MCG/ACT Aero Soln inhalation aerosol Inhale 2 Puffs every four hours as needed for Shortness of Breath. 1 Each 1    fluticasone (FLONASE) 50 MCG/ACT nasal spray Administer 1 Spray into affected nostril(S) every day.      cetirizine (ZYRTEC) 10 MG Tab Take 10 mg by mouth every day.      celecoxib (CELEBREX) 200 MG Cap Take 200 mg by mouth every morning.      clopidogrel (PLAVIX) 75 MG Tab       metoprolol SR (TOPROL XL) 50 MG TABLET SR 24 HR Take 50 mg by mouth every day.      atorvastatin (LIPITOR) 80 MG tablet Take 80 mg by mouth every evening.      fluorouracil (EFUDEX) 5 % cream APPLY TOPICALLY TO THE AFFECTED AREA OF FACE TWICE DAILY IN THE MORNING AND IN THE EVENING FOR 4 TO 6 WEEKS       No current facility-administered medications on file prior to visit.         Allergies:   No Known Allergies    Social Hx:   Social History     Socioeconomic History    Marital status:      Spouse name: Not on file    Number of children: Not on file    Years of education: Not on file    Highest education level: Not on file   Occupational History    Not on file   Tobacco Use    Smoking status: Never    Smokeless tobacco: Never   Vaping Use    Vaping status: Never Used   Substance and Sexual Activity    Alcohol use: Not Currently    Drug use: Not Currently    Sexual activity: Not Currently   Other Topics Concern    Not on file   Social History Narrative    Not on file     Social Determinants of Health     Financial Resource Strain: Not on file   Food Insecurity: Not on file   Transportation Needs: Not on file   Physical Activity: Not on file   Stress: Not on file   Social Connections: Not on file   Intimate Partner Violence: Not on file   Housing Stability: Not on file         EXAMINATION     Physical Exam:   Vitals: /76 (BP Location: Right arm, Patient Position:  Sitting, BP Cuff Size: Adult)   Pulse (!) 59   Temp 36.2 °C (97.2 °F) (Temporal)   Ht 1.524 m (5')   Wt 63.5 kg (140 lb)   SpO2 99%     Constitutional:   Body Habitus: Body mass index is 27.34 kg/m².  Cooperation: Fully cooperates with exam  Appearance: Well-groomed no disheveled    Respiratory-  breathing comfortable on room air, no audible wheezing  Cardiovascular- capillary refills less than 2 seconds. No lower extremity edema is noted.   Psychiatric- alert and oriented ×3. Normal affect.    MSK and Neuro: -      Thoracic/Lumbar Spine/Sacral Spine/Hips   There are no signs of infection around the injection sites.   full  active range of motion with flexion, lateral flexion, and rotation bilaterally.   There is full  active range of motion with lumbar extension.    There is no  pain with lumbar extension.   There is no pain with facet loading maneuver (extension rotation) with axial low back pain on the BILATERAL side(s)       Palpation:   No tenderness to palpation in midline at T1-T12 levels. No tenderness to palpation in the left and right of the midline T1-L5, NEGATIVE for tenderness to palpation to the para-midline region in the lower lumbar levels.  palpation over SI joint: negative bilaterally    palpation in hip or over the gluteus medius tendon insertion: negative bilaterally      Lumbar spine Special tests  Neuro tension  Straight leg test negative bilaterally    Slump test negative bilaterally      Key points for the international standards for neurological classification of spinal cord injury (ISNCSCI) to light touch.     Dermatome R L                                      L2 2 2   L3 2 2   L4 2 2   L5 2 2   S1 2 2   S2 2 2         Motor Exam Lower Extremities    ? Myotome R L   Hip flexion L2 5 5   Knee extension L3 5 5   Ankle dorsiflexion L4 5 5   Toe extension L5 5 5   Ankle plantarflexion S1 5 5             MEDICAL DECISION MAKING    DATA    Labs:   Lab Results   Component Value Date/Time     "SODIUM 139 01/23/2024 02:14 AM    POTASSIUM 3.9 01/23/2024 02:14 AM    CHLORIDE 104 01/23/2024 02:14 AM    CO2 22 01/23/2024 02:14 AM    GLUCOSE 97 01/23/2024 02:14 AM    BUN 23 (H) 01/23/2024 02:14 AM    CREATININE 0.62 01/23/2024 02:14 AM        No results found for: \"PROTHROMBTM\", \"INR\"     Lab Results   Component Value Date/Time    WBC 11.2 (H) 01/23/2024 02:14 AM    RBC 4.24 01/23/2024 02:14 AM    HEMOGLOBIN 13.3 01/23/2024 02:14 AM    HEMATOCRIT 40.4 01/23/2024 02:14 AM    MCV 95.3 01/23/2024 02:14 AM    MCH 31.4 01/23/2024 02:14 AM    MCHC 32.9 01/23/2024 02:14 AM    MPV 9.4 01/23/2024 02:14 AM    NEUTSPOLYS 60.00 01/22/2024 07:15 PM    LYMPHOCYTES 27.60 01/22/2024 07:15 PM    MONOCYTES 9.70 01/22/2024 07:15 PM    EOSINOPHILS 1.10 01/22/2024 07:15 PM    BASOPHILS 0.50 01/22/2024 07:15 PM        No results found for: \"HBA1C\"       Imaging:   I personally reviewed following images            I reviewed the following radiology reports          Results for orders placed during the hospital encounter of 03/08/24    MR-LUMBAR SPINE-W/O    Impression  1.  Degenerative disease as described above.  2.  There is severe right L4 foraminal stenosis with nerve root impingement.  3.  Infrarenal abdominal aortic aneurysm.                                Results for orders placed in visit on 01/02/24    DX-CHEST-2 VIEWS    Impression  NEGATIVE TWO VIEWS OF THE CHEST.                     Results for orders placed in visit on 10/04/23    DX-KNEE COMPLETE 4+ BILATERAL    Results for orders placed during the hospital encounter of 03/08/24    DX-LUMBAR SPINE-4+ VIEWS    Impression  1.  Multilevel disc and facet joint degenerative changes.  2.  Osteopenia without evidence displaced fracture.  3.  No evidence of dynamic instability.  4.  3.6 cm infrarenal abdominal aortic aneurysm.                        DIAGNOSIS   Visit Diagnoses     ICD-10-CM   1. Lumbar radiculopathy  M54.16   2. Lumbar spondylosis  M47.816   3. Lumbar " degenerative disc disease  M51.36   4. Chronic bilateral low back pain without sciatica  M54.50    G89.29   5. History of MI (myocardial infarction)  I25.2   6. S/P coronary artery stent placement  Z95.5           ASSESSMENT and PLAN:     Chantelle Sánchez  10/31/1927 female      Chantelle was seen today for follow-up.    Diagnoses and all orders for this visit:    Lumbar radiculopathy    Lumbar spondylosis    Lumbar degenerative disc disease    Chronic bilateral low back pain without sciatica    History of MI (myocardial infarction)    S/P coronary artery stent placement        The patient had excellent improvement with pain and function after the epidural steroid injection with 90% improvement in pain.  And a significant improvement in function with greater than 50 send improvement of function.  She can more easily do her ADLs.    Medications: Acetaminophen up to 1000 mg 3 times daily as needed not to exceed 3000 mg per 24-hours.      I also discussed the case with the patient's daughter Gabriella who was at today's visit and assisted with the HPI    Follow up: After the above procedure    Thank you for allowing me to participate in the care of this patient. If you have any questions please not hesitate to contact me.             Please note that this dictation was created using voice recognition software. I have made every reasonable attempt to correct obvious errors but there may be errors of grammar and content that I may have overlooked prior to finalization of this note.      Enrique Louise MD  Interventional Spine and Sports Physiatry  Physical Medicine and Rehabilitation  [facility]

## 2024-06-11 ENCOUNTER — APPOINTMENT (OUTPATIENT)
Dept: PHYSICAL THERAPY | Facility: REHABILITATION | Age: 89
End: 2024-06-11
Attending: PHYSICAL MEDICINE & REHABILITATION
Payer: MEDICARE

## 2024-06-11 DIAGNOSIS — K21.9 GASTROESOPHAGEAL REFLUX DISEASE, UNSPECIFIED WHETHER ESOPHAGITIS PRESENT: ICD-10-CM

## 2024-06-12 RX ORDER — LANSOPRAZOLE 30 MG/1
30 CAPSULE, DELAYED RELEASE ORAL
Qty: 90 CAPSULE | Refills: 0 | Status: SHIPPED | OUTPATIENT
Start: 2024-06-12

## 2024-06-18 ENCOUNTER — APPOINTMENT (OUTPATIENT)
Dept: PHYSICAL THERAPY | Facility: REHABILITATION | Age: 89
End: 2024-06-18
Attending: PHYSICAL MEDICINE & REHABILITATION
Payer: MEDICARE

## 2024-06-25 ENCOUNTER — APPOINTMENT (OUTPATIENT)
Dept: CARDIOLOGY | Facility: MEDICAL CENTER | Age: 89
End: 2024-06-25
Attending: INTERNAL MEDICINE
Payer: MEDICARE

## 2024-06-25 ENCOUNTER — APPOINTMENT (OUTPATIENT)
Dept: PHYSICAL THERAPY | Facility: REHABILITATION | Age: 89
End: 2024-06-25
Attending: PHYSICAL MEDICINE & REHABILITATION
Payer: MEDICARE

## 2024-07-02 ENCOUNTER — APPOINTMENT (OUTPATIENT)
Dept: PHYSICAL THERAPY | Facility: REHABILITATION | Age: 89
End: 2024-07-02
Attending: PHYSICAL MEDICINE & REHABILITATION
Payer: MEDICARE

## 2024-07-05 ENCOUNTER — HOSPITAL ENCOUNTER (OUTPATIENT)
Dept: RADIOLOGY | Facility: MEDICAL CENTER | Age: 89
End: 2024-07-05
Attending: INTERNAL MEDICINE
Payer: MEDICARE

## 2024-07-05 DIAGNOSIS — I72.9 ANEURYSM (HCC): ICD-10-CM

## 2024-07-05 PROCEDURE — 93978 VASCULAR STUDY: CPT

## 2024-07-07 PROCEDURE — 93978 VASCULAR STUDY: CPT | Mod: 26 | Performed by: INTERNAL MEDICINE

## 2024-07-09 ENCOUNTER — APPOINTMENT (OUTPATIENT)
Dept: PHYSICAL THERAPY | Facility: REHABILITATION | Age: 89
End: 2024-07-09
Attending: PHYSICAL MEDICINE & REHABILITATION
Payer: MEDICARE

## 2024-07-16 ENCOUNTER — APPOINTMENT (OUTPATIENT)
Dept: PHYSICAL THERAPY | Facility: REHABILITATION | Age: 89
End: 2024-07-16
Attending: PHYSICAL MEDICINE & REHABILITATION
Payer: MEDICARE

## 2024-07-23 ENCOUNTER — APPOINTMENT (RX ONLY)
Dept: URBAN - METROPOLITAN AREA CLINIC 6 | Facility: CLINIC | Age: 89
Setting detail: DERMATOLOGY
End: 2024-07-23

## 2024-07-23 DIAGNOSIS — L81.4 OTHER MELANIN HYPERPIGMENTATION: ICD-10-CM

## 2024-07-23 DIAGNOSIS — L82.1 OTHER SEBORRHEIC KERATOSIS: ICD-10-CM

## 2024-07-23 DIAGNOSIS — D22 MELANOCYTIC NEVI: ICD-10-CM

## 2024-07-23 DIAGNOSIS — D485 NEOPLASM OF UNCERTAIN BEHAVIOR OF SKIN: ICD-10-CM

## 2024-07-23 DIAGNOSIS — D18.0 HEMANGIOMA: ICD-10-CM

## 2024-07-23 DIAGNOSIS — Z86.007 PERSONAL HISTORY OF IN-SITU NEOPLASM OF SKIN: ICD-10-CM

## 2024-07-23 DIAGNOSIS — D69.2 OTHER NONTHROMBOCYTOPENIC PURPURA: ICD-10-CM

## 2024-07-23 DIAGNOSIS — Z71.89 OTHER SPECIFIED COUNSELING: ICD-10-CM

## 2024-07-23 DIAGNOSIS — L57.0 ACTINIC KERATOSIS: ICD-10-CM

## 2024-07-23 PROBLEM — D48.5 NEOPLASM OF UNCERTAIN BEHAVIOR OF SKIN: Status: ACTIVE | Noted: 2024-07-23

## 2024-07-23 PROBLEM — D18.01 HEMANGIOMA OF SKIN AND SUBCUTANEOUS TISSUE: Status: ACTIVE | Noted: 2024-07-23

## 2024-07-23 PROBLEM — D22.5 MELANOCYTIC NEVI OF TRUNK: Status: ACTIVE | Noted: 2024-07-23

## 2024-07-23 PROCEDURE — ? COUNSELING

## 2024-07-23 PROCEDURE — 99213 OFFICE O/P EST LOW 20 MIN: CPT | Mod: 25

## 2024-07-23 PROCEDURE — ? SUNSCREEN TREATMENT REGIMEN

## 2024-07-23 PROCEDURE — 11102 TANGNTL BX SKIN SINGLE LES: CPT

## 2024-07-23 PROCEDURE — ? LIQUID NITROGEN

## 2024-07-23 PROCEDURE — 17000 DESTRUCT PREMALG LESION: CPT | Mod: 59

## 2024-07-23 PROCEDURE — 17003 DESTRUCT PREMALG LES 2-14: CPT

## 2024-07-23 PROCEDURE — ? PHOTO-DOCUMENTATION

## 2024-07-23 PROCEDURE — ? BIOPSY BY SHAVE METHOD

## 2024-07-23 PROCEDURE — ? OBSERVATION

## 2024-07-23 ASSESSMENT — LOCATION DETAILED DESCRIPTION DERM
LOCATION DETAILED: LEFT DISTAL DORSAL FOREARM
LOCATION DETAILED: LEFT DISTAL POSTERIOR UPPER ARM
LOCATION DETAILED: EPIGASTRIC SKIN
LOCATION DETAILED: LEFT CENTRAL ZYGOMA
LOCATION DETAILED: RIGHT LATERAL BREAST 6-7:00 REGION
LOCATION DETAILED: RIGHT DISTAL POSTERIOR UPPER ARM
LOCATION DETAILED: LEFT INFERIOR FOREHEAD
LOCATION DETAILED: RIGHT PROXIMAL RADIAL DORSAL FOREARM
LOCATION DETAILED: RIGHT VENTRAL PROXIMAL FOREARM
LOCATION DETAILED: LEFT LATERAL MALAR CHEEK
LOCATION DETAILED: LEFT PROXIMAL DORSAL FOREARM
LOCATION DETAILED: RIGHT RADIAL DORSAL HAND
LOCATION DETAILED: RIGHT MEDIAL SUPERIOR CHEST
LOCATION DETAILED: RIGHT PROXIMAL DORSAL FOREARM
LOCATION DETAILED: LEFT MEDIAL UPPER BACK
LOCATION DETAILED: LEFT RADIAL DORSAL HAND
LOCATION DETAILED: RIGHT CENTRAL MALAR CHEEK
LOCATION DETAILED: LEFT VENTRAL PROXIMAL FOREARM
LOCATION DETAILED: MIDDLE STERNUM

## 2024-07-23 ASSESSMENT — LOCATION ZONE DERM
LOCATION ZONE: ARM
LOCATION ZONE: FACE
LOCATION ZONE: TRUNK
LOCATION ZONE: HAND

## 2024-07-23 ASSESSMENT — LOCATION SIMPLE DESCRIPTION DERM
LOCATION SIMPLE: LEFT FOREHEAD
LOCATION SIMPLE: RIGHT POSTERIOR UPPER ARM
LOCATION SIMPLE: RIGHT BREAST
LOCATION SIMPLE: LEFT POSTERIOR UPPER ARM
LOCATION SIMPLE: RIGHT CHEEK
LOCATION SIMPLE: RIGHT FOREARM
LOCATION SIMPLE: RIGHT HAND
LOCATION SIMPLE: LEFT CHEEK
LOCATION SIMPLE: LEFT UPPER BACK
LOCATION SIMPLE: LEFT FOREARM
LOCATION SIMPLE: LEFT ZYGOMA
LOCATION SIMPLE: ABDOMEN
LOCATION SIMPLE: CHEST
LOCATION SIMPLE: LEFT HAND

## 2024-07-23 NOTE — PROCEDURE: BIOPSY BY SHAVE METHOD
Detail Level: Detailed
Depth Of Biopsy: dermis
Was A Bandage Applied: Yes
Size Of Lesion In Cm: 1
X Size Of Lesion In Cm: 0
Biopsy Type: H and E
Biopsy Method: Dermablade
Anesthesia Type: 1% lidocaine with epinephrine and a 1:10 solution of 8.4% sodium bicarbonate
Anesthesia Volume In Cc: 0.5
Hemostasis: Drysol
Wound Care: Vaseline
Dressing: bandage
Destruction After The Procedure: No
Type Of Destruction Used: Curettage
Curettage Text: The wound bed was treated with curettage after the biopsy was performed.
Cryotherapy Text: The wound bed was treated with cryotherapy after the biopsy was performed.
Electrodesiccation Text: The wound bed was treated with electrodesiccation after the biopsy was performed.
Electrodesiccation And Curettage Text: The wound bed was treated with electrodesiccation and curettage after the biopsy was performed.
Silver Nitrate Text: The wound bed was treated with silver nitrate after the biopsy was performed.
Lab: [id number]
Lab Facility: 
Consent: Written consent was obtained and risks were reviewed including but not limited to scarring, infection, bleeding, scabbing, incomplete removal, nerve damage and allergy to anesthesia.
Post-Care Instructions: I reviewed with the patient in detail post-care instructions. Patient is to keep the biopsy site dry overnight, and then apply bacitracin twice daily until healed. Patient may apply hydrogen peroxide soaks to remove any crusting.
Notification Instructions: Patient will be notified of biopsy results. However, patient instructed to call the office if not contacted within 2 weeks.
Billing Type: Third-Party Bill
Information: Selecting Yes will display possible errors in your note based on the variables you have selected. This validation is only offered as a suggestion for you. PLEASE NOTE THAT THE VALIDATION TEXT WILL BE REMOVED WHEN YOU FINALIZE YOUR NOTE. IF YOU WANT TO FAX A PRELIMINARY NOTE YOU WILL NEED TO TOGGLE THIS TO 'NO' IF YOU DO NOT WANT IT IN YOUR FAXED NOTE.

## 2024-09-30 ENCOUNTER — TELEPHONE (OUTPATIENT)
Dept: CARDIOLOGY | Facility: MEDICAL CENTER | Age: 89
End: 2024-09-30
Payer: MEDICARE

## 2024-09-30 NOTE — TELEPHONE ENCOUNTER
LVM to pt asking if they are planning on getting their labs/imaging done prior to their next appt with VR. I let pt know that they can call imaging scheduling at [phone] ti schedule an appt & lab scheduling at [phone] to schedule an appt or they can do a walk in at any of our lab locations.

## 2024-10-04 ENCOUNTER — HOSPITAL ENCOUNTER (OUTPATIENT)
Dept: LAB | Facility: MEDICAL CENTER | Age: 89
End: 2024-10-04
Attending: STUDENT IN AN ORGANIZED HEALTH CARE EDUCATION/TRAINING PROGRAM
Payer: MEDICARE

## 2024-10-04 DIAGNOSIS — Z09 HOSPITAL DISCHARGE FOLLOW-UP: ICD-10-CM

## 2024-10-04 DIAGNOSIS — I25.10 CORONARY ARTERY DISEASE INVOLVING NATIVE CORONARY ARTERY OF NATIVE HEART WITHOUT ANGINA PECTORIS: ICD-10-CM

## 2024-10-04 DIAGNOSIS — R55 SYNCOPE AND COLLAPSE: ICD-10-CM

## 2024-10-04 LAB
CHOLEST SERPL-MCNC: 160 MG/DL (ref 100–199)
FASTING STATUS PATIENT QL REPORTED: NORMAL
HDLC SERPL-MCNC: 45 MG/DL
LDLC SERPL CALC-MCNC: 93 MG/DL
TRIGL SERPL-MCNC: 109 MG/DL (ref 0–149)
TSH SERPL DL<=0.005 MIU/L-ACNC: 2.22 UIU/ML (ref 0.38–5.33)

## 2024-10-04 PROCEDURE — 80061 LIPID PANEL: CPT

## 2024-10-04 PROCEDURE — 84443 ASSAY THYROID STIM HORMONE: CPT

## 2024-10-04 PROCEDURE — 36415 COLL VENOUS BLD VENIPUNCTURE: CPT

## 2024-10-16 ENCOUNTER — OFFICE VISIT (OUTPATIENT)
Dept: CARDIOLOGY | Facility: MEDICAL CENTER | Age: 89
End: 2024-10-16
Attending: INTERNAL MEDICINE
Payer: MEDICARE

## 2024-10-16 VITALS
WEIGHT: 141 LBS | SYSTOLIC BLOOD PRESSURE: 120 MMHG | OXYGEN SATURATION: 95 % | HEIGHT: 60 IN | BODY MASS INDEX: 27.68 KG/M2 | HEART RATE: 64 BPM | RESPIRATION RATE: 16 BRPM | DIASTOLIC BLOOD PRESSURE: 74 MMHG

## 2024-10-16 DIAGNOSIS — I35.1 NONRHEUMATIC AORTIC VALVE INSUFFICIENCY: ICD-10-CM

## 2024-10-16 DIAGNOSIS — I25.10 CORONARY ARTERY DISEASE INVOLVING NATIVE CORONARY ARTERY OF NATIVE HEART WITHOUT ANGINA PECTORIS: ICD-10-CM

## 2024-10-16 DIAGNOSIS — I25.2 HISTORY OF MI (MYOCARDIAL INFARCTION): ICD-10-CM

## 2024-10-16 DIAGNOSIS — E78.5 HYPERLIPIDEMIA, UNSPECIFIED HYPERLIPIDEMIA TYPE: ICD-10-CM

## 2024-10-16 DIAGNOSIS — I47.20 VENTRICULAR TACHYCARDIA (HCC): ICD-10-CM

## 2024-10-16 DIAGNOSIS — Z95.5 S/P CORONARY ARTERY STENT PLACEMENT: ICD-10-CM

## 2024-10-16 PROBLEM — Z01.810 PREOPERATIVE CARDIOVASCULAR EXAMINATION: Status: RESOLVED | Noted: 2024-04-11 | Resolved: 2024-10-16

## 2024-10-16 PROCEDURE — 99212 OFFICE O/P EST SF 10 MIN: CPT | Performed by: INTERNAL MEDICINE

## 2024-10-16 PROCEDURE — G2211 COMPLEX E/M VISIT ADD ON: HCPCS | Performed by: INTERNAL MEDICINE

## 2024-10-16 PROCEDURE — 99214 OFFICE O/P EST MOD 30 MIN: CPT | Performed by: INTERNAL MEDICINE

## 2024-10-16 PROCEDURE — 3074F SYST BP LT 130 MM HG: CPT | Performed by: INTERNAL MEDICINE

## 2024-10-16 PROCEDURE — 3078F DIAST BP <80 MM HG: CPT | Performed by: INTERNAL MEDICINE

## 2024-10-16 ASSESSMENT — ENCOUNTER SYMPTOMS
PALPITATIONS: 0
IRREGULAR HEARTBEAT: 0
DECREASED APPETITE: 0
DEPRESSION: 0
ALTERED MENTAL STATUS: 0
NEAR-SYNCOPE: 0
WEIGHT LOSS: 0
FEVER: 0
ABDOMINAL PAIN: 0
ORTHOPNEA: 0
CLAUDICATION: 0
DIZZINESS: 0
DIARRHEA: 0
HEARTBURN: 0
BACK PAIN: 0
COUGH: 0
CONSTIPATION: 0
SYNCOPE: 0
PND: 0
DYSPNEA ON EXERTION: 0
FLANK PAIN: 0
SHORTNESS OF BREATH: 0
WEIGHT GAIN: 0
VOMITING: 0
BLURRED VISION: 0
NAUSEA: 0

## 2024-10-16 ASSESSMENT — FIBROSIS 4 INDEX: FIB4 SCORE: 1.71

## 2024-10-17 ENCOUNTER — OFFICE VISIT (OUTPATIENT)
Dept: PHYSICAL MEDICINE AND REHAB | Facility: MEDICAL CENTER | Age: 89
End: 2024-10-17
Payer: MEDICARE

## 2024-10-17 VITALS
HEART RATE: 68 BPM | DIASTOLIC BLOOD PRESSURE: 72 MMHG | BODY MASS INDEX: 28.07 KG/M2 | WEIGHT: 143 LBS | TEMPERATURE: 96.5 F | HEIGHT: 60 IN | OXYGEN SATURATION: 95 % | SYSTOLIC BLOOD PRESSURE: 153 MMHG

## 2024-10-17 DIAGNOSIS — M25.561 CHRONIC PAIN OF BOTH KNEES: ICD-10-CM

## 2024-10-17 DIAGNOSIS — G89.29 CHRONIC BILATERAL LOW BACK PAIN WITHOUT SCIATICA: ICD-10-CM

## 2024-10-17 DIAGNOSIS — G89.29 CHRONIC PAIN OF BOTH KNEES: ICD-10-CM

## 2024-10-17 DIAGNOSIS — I25.2 HISTORY OF MI (MYOCARDIAL INFARCTION): ICD-10-CM

## 2024-10-17 DIAGNOSIS — Z95.5 S/P CORONARY ARTERY STENT PLACEMENT: ICD-10-CM

## 2024-10-17 DIAGNOSIS — Z79.02 ANTIPLATELET OR ANTITHROMBOTIC LONG-TERM USE: ICD-10-CM

## 2024-10-17 DIAGNOSIS — M47.816 LUMBAR SPONDYLOSIS: ICD-10-CM

## 2024-10-17 DIAGNOSIS — M17.10 ARTHRITIS OF KNEE: ICD-10-CM

## 2024-10-17 DIAGNOSIS — M25.562 CHRONIC PAIN OF BOTH KNEES: ICD-10-CM

## 2024-10-17 DIAGNOSIS — M54.50 CHRONIC BILATERAL LOW BACK PAIN WITHOUT SCIATICA: ICD-10-CM

## 2024-10-17 DIAGNOSIS — M54.16 LUMBAR RADICULOPATHY: ICD-10-CM

## 2024-10-17 PROCEDURE — 3078F DIAST BP <80 MM HG: CPT | Performed by: PHYSICAL MEDICINE & REHABILITATION

## 2024-10-17 PROCEDURE — 1125F AMNT PAIN NOTED PAIN PRSNT: CPT | Performed by: PHYSICAL MEDICINE & REHABILITATION

## 2024-10-17 PROCEDURE — 99214 OFFICE O/P EST MOD 30 MIN: CPT | Mod: 25 | Performed by: PHYSICAL MEDICINE & REHABILITATION

## 2024-10-17 PROCEDURE — 3077F SYST BP >= 140 MM HG: CPT | Performed by: PHYSICAL MEDICINE & REHABILITATION

## 2024-10-17 PROCEDURE — 76882 US LMTD JT/FCL EVL NVASC XTR: CPT | Performed by: PHYSICAL MEDICINE & REHABILITATION

## 2024-10-17 ASSESSMENT — FIBROSIS 4 INDEX: FIB4 SCORE: 1.71

## 2024-10-17 ASSESSMENT — PAIN SCALES - GENERAL: PAINLEVEL: 9=SEVERE PAIN

## 2024-10-17 ASSESSMENT — PATIENT HEALTH QUESTIONNAIRE - PHQ9
SUM OF ALL RESPONSES TO PHQ QUESTIONS 1-9: 13
5. POOR APPETITE OR OVEREATING: 0 - NOT AT ALL
CLINICAL INTERPRETATION OF PHQ2 SCORE: 2

## 2024-10-17 NOTE — ED TRIAGE NOTES
"Chantelle Sánchez  89 y.o. female    Chief Complaint   Patient presents with    Syncope       Pt arrives via EMS after syncopal episode that occurred when pt attempted to stand from recliner and fell back into recliner. Per EMS, pt hypotensive on scene 60's/40's and bradycardic into 50's. Pt arrives A7Ox4, GCS 15. Normotensive. Taking steroids for a recent \"cold\"    Given 250cc by medics. Pt on plavix for hx of MI    "
Kenia CARRASCO RN

## 2024-10-17 NOTE — H&P (VIEW-ONLY)
Follow up patient note  Interventional spine and Pain  Physiatry (physical medicine and  Rehabilitation)       Chief complaint:   Chief Complaint   Patient presents with    Follow-Up     Back pain          HISTORY    Please see new patient note by Dr Louise,  for more details.     HPI  Patient identification: Chantelle Sánchez ,  10/31/1927,   With Diagnoses of Chronic pain of both knees, Arthritis of knee, Lumbar radiculopathy, Lumbar spondylosis, Chronic bilateral low back pain without sciatica, History of MI (myocardial infarction), S/P coronary artery stent placement, and Antiplatelet or antithrombotic long-term use were pertinent to this visit.     Procedures   Status post lumbar L5-S1 interlaminar epidural steroid injection which I performed on 2024 with 90% pain relief overall.  Preprocedure pain 7/10 NRS postprocedure pain 1/10 NRS.      History of Present Illness  The patient is a 89-year-old female here for a follow-up visit.    She reports that her back pain is relatively stable and mild in intensity. She does not experience radiating pain down her legs.    The primary concern is severe, chronic pain in both knees, rated as 9 out of 10 in intensity. The pain is constant, aching, and non-radiating, and it worsens with standing, walking, and using stairs. Her left knee is more problematic than the right one. She experiences significant discomfort and difficulty standing up, occasionally being unable to do so.    She has been receiving injections every three months at the orthopedic clinic. Previously, she was treated with Zilretta 32 mg in [place]. However, the last injection in her left knee did not provide any relief.    She is accompanied by her daughter, Gabriella, who is also present in the room today. Apart from the knee pain, she is doing well. She is currently on Plavix.    Patient is done a provider driven home exercise program in the past including the past 6 months.    Not on NSAIDs  because of age.  Not on muscle relaxers because of age and she has tried acetaminophen.         ROS Red Flags :   Fever, Chills, Sweats: Denies  Involuntary Weight Loss: Denies  Bowel/Bladder Incontinence: Denies  Saddle Anesthesia: Denies        PMHx:   Past Medical History:   Diagnosis Date    History of heart artery stent     History of heart attack     Hyperlipidemia     Hypertension     Osteoarthritis     (B)       PSHx:   Past Surgical History:   Procedure Laterality Date    NH INJ LUMBAR/SACRAL,W/ IMAGING N/A 4/23/2024    Procedure: Lumbar L5-S1 interlaminar epidural steroid injection…..Note: Patient needs clearance to be off of Plavix for 5 days prior to the procedure;  Surgeon: Enrique Louise M.D.;  [facility];  Service: Pain Management    CARPAL TUNNEL RELEASE      CATARACT EXTRACTION WITH IOL      KNEE ARTHROSCOPY      OTHER ORTHOPEDIC SURGERY      toe    RHINOPLASTY         Family history   History reviewed. No pertinent family history.      Medications:   Outpatient Medications Marked as Taking for the 10/17/24 encounter (Office Visit) with Enrique Louise M.D.   Medication Sig Dispense Refill    traMADol (ULTRAM) 50 MG Tab Take 1 Tablet by mouth every 6 hours as needed for Moderate Pain for up to 30 days. 120 Tablet 0    lansoprazole (PREVACID) 30 MG CAPSULE DELAYED RELEASE TAKE 1 CAPSULE BY MOUTH EVERY DAY 90 Capsule 0    Dextromethorphan-guaiFENesin (MUCINEX DM)  MG TABLET SR 12 HR Take 1 Each by mouth 3 times a day as needed (cough). 30 Tablet 0    ipratropium-albuterol (DUONEB) 0.5-2.5 (3) MG/3ML nebulizer solution Take 3 mL by nebulization every 6 hours as needed for Shortness of Breath (wheezing). 30 Each 0    albuterol 108 (90 Base) MCG/ACT Aero Soln inhalation aerosol Inhale 2 Puffs every four hours as needed for Shortness of Breath. 1 Each 1    fluticasone (FLONASE) 50 MCG/ACT nasal spray Administer 1 Spray into affected nostril(S) every day.       cetirizine (ZYRTEC) 10 MG Tab Take 10 mg by mouth every day.      clopidogrel (PLAVIX) 75 MG Tab       metoprolol SR (TOPROL XL) 50 MG TABLET SR 24 HR Take 50 mg by mouth every day.      atorvastatin (LIPITOR) 80 MG tablet Take 80 mg by mouth every evening.          Current Outpatient Medications on File Prior to Visit   Medication Sig Dispense Refill    traMADol (ULTRAM) 50 MG Tab Take 1 Tablet by mouth every 6 hours as needed for Moderate Pain for up to 30 days. 120 Tablet 0    lansoprazole (PREVACID) 30 MG CAPSULE DELAYED RELEASE TAKE 1 CAPSULE BY MOUTH EVERY DAY 90 Capsule 0    Dextromethorphan-guaiFENesin (MUCINEX DM)  MG TABLET SR 12 HR Take 1 Each by mouth 3 times a day as needed (cough). 30 Tablet 0    ipratropium-albuterol (DUONEB) 0.5-2.5 (3) MG/3ML nebulizer solution Take 3 mL by nebulization every 6 hours as needed for Shortness of Breath (wheezing). 30 Each 0    albuterol 108 (90 Base) MCG/ACT Aero Soln inhalation aerosol Inhale 2 Puffs every four hours as needed for Shortness of Breath. 1 Each 1    fluticasone (FLONASE) 50 MCG/ACT nasal spray Administer 1 Spray into affected nostril(S) every day.      cetirizine (ZYRTEC) 10 MG Tab Take 10 mg by mouth every day.      clopidogrel (PLAVIX) 75 MG Tab       metoprolol SR (TOPROL XL) 50 MG TABLET SR 24 HR Take 50 mg by mouth every day.      atorvastatin (LIPITOR) 80 MG tablet Take 80 mg by mouth every evening.      Naloxone (NARCAN) 4 MG/0.1ML Liquid One spray in one nostril for overdose and call 911. 1 Each 0    fluorouracil (EFUDEX) 5 % cream APPLY TOPICALLY TO THE AFFECTED AREA OF FACE TWICE DAILY IN THE MORNING AND IN THE EVENING FOR 4 TO 6 WEEKS       No current facility-administered medications on file prior to visit.         Allergies:   No Known Allergies    Social Hx:   Social History     Socioeconomic History    Marital status:      Spouse name: Not on file    Number of children: Not on file    Years of education: Not on file     Highest education level: Not on file   Occupational History    Not on file   Tobacco Use    Smoking status: Never    Smokeless tobacco: Never   Vaping Use    Vaping status: Never Used   Substance and Sexual Activity    Alcohol use: Not Currently    Drug use: Not Currently    Sexual activity: Not Currently   Other Topics Concern    Not on file   Social History Narrative    Not on file     Social Determinants of Health     Financial Resource Strain: Not on file   Food Insecurity: Not on file   Transportation Needs: Not on file   Physical Activity: Not on file   Stress: Not on file   Social Connections: Not on file   Intimate Partner Violence: Not on file   Housing Stability: Not on file         EXAMINATION     Physical Exam:   Vitals: BP (!) 153/72 (BP Location: Right arm, Patient Position: Sitting, BP Cuff Size: Adult)   Pulse 68   Temp 35.8 °C (96.5 °F) (Temporal)   Ht 1.524 m (5')   Wt 64.9 kg (143 lb)   SpO2 95%     Constitutional:   Body Habitus: Body mass index is 27.93 kg/m².  Cooperation: Fully cooperates with exam  Appearance: Well-groomed no disheveled    Respiratory-  breathing comfortable on room air, no audible wheezing  Cardiovascular- capillary refills less than 2 seconds. No lower extremity edema is noted.   Psychiatric- alert and oriented ×3. Normal affect.    MSK and Neuro: -    BILATERAL  Knee:   Inspection suprapatellar effusions are present.  No other swelling, rashes   Palpation positive mild tenderness palpation of the medial and lateral joint line.  No significant tenderness to palpation throughout the knee including the  quadriceps tendon, patellar tendon, patellar, medial patellar facets, lateral patellar facets, tibial tuberosity, fibular head.  Range of motion normal range of motion in flexion and extension  Special tests:   Varus stress tests: Negative  Valgus stress tests: Negative  Lockman's test: Negative  Anterior drawer: Negative  Posterior drawer: Negative  Elvis's:  "negative             MEDICAL DECISION MAKING    DATA    Labs:   Lab Results   Component Value Date/Time    SODIUM 139 01/23/2024 02:14 AM    POTASSIUM 3.9 01/23/2024 02:14 AM    CHLORIDE 104 01/23/2024 02:14 AM    CO2 22 01/23/2024 02:14 AM    GLUCOSE 97 01/23/2024 02:14 AM    BUN 23 (H) 01/23/2024 02:14 AM    CREATININE 0.62 01/23/2024 02:14 AM        No results found for: \"PROTHROMBTM\", \"INR\"     Lab Results   Component Value Date/Time    WBC 11.2 (H) 01/23/2024 02:14 AM    RBC 4.24 01/23/2024 02:14 AM    HEMOGLOBIN 13.3 01/23/2024 02:14 AM    HEMATOCRIT 40.4 01/23/2024 02:14 AM    MCV 95.3 01/23/2024 02:14 AM    MCH 31.4 01/23/2024 02:14 AM    MCHC 32.9 01/23/2024 02:14 AM    MPV 9.4 01/23/2024 02:14 AM    NEUTSPOLYS 60.00 01/22/2024 07:15 PM    LYMPHOCYTES 27.60 01/22/2024 07:15 PM    MONOCYTES 9.70 01/22/2024 07:15 PM    EOSINOPHILS 1.10 01/22/2024 07:15 PM    BASOPHILS 0.50 01/22/2024 07:15 PM        No results found for: \"HBA1C\"       Imaging:   I personally reviewed following images      Results  Imaging  X-ray from October 4, 2023 shows degenerative changes with arthritis in the bilateral knees.     10/17/2024 I performed a Limited diagnostic ultrasound of the bilateral knees shows enthesopathy of the connection of the quadriceps tendon to the patella on the right knee, a small suprapatellar effusion on the right knee, and a moderate to large size suprapatellar effusion in the left knee.         I reviewed the following radiology reports          Results for orders placed during the hospital encounter of 03/08/24    MR-LUMBAR SPINE-W/O    Impression  1.  Degenerative disease as described above.  2.  There is severe right L4 foraminal stenosis with nerve root impingement.  3.  Infrarenal abdominal aortic aneurysm.                                Results for orders placed in visit on 01/02/24    DX-CHEST-2 VIEWS    Impression  NEGATIVE TWO VIEWS OF THE CHEST.                     Results for orders placed in " visit on 10/04/23    DX-KNEE COMPLETE 4+ BILATERAL    Results for orders placed during the hospital encounter of 24    DX-LUMBAR SPINE-4+ VIEWS    Impression  1.  Multilevel disc and facet joint degenerative changes.  2.  Osteopenia without evidence displaced fracture.  3.  No evidence of dynamic instability.  4.  3.6 cm infrarenal abdominal aortic aneurysm.                        DIAGNOSIS   Visit Diagnoses     ICD-10-CM   1. Chronic pain of both knees  M25.561    M25.562    G89.29   2. Arthritis of knee  M17.10   3. Lumbar radiculopathy  M54.16   4. Lumbar spondylosis  M47.816   5. Chronic bilateral low back pain without sciatica  M54.50    G89.29   6. History of MI (myocardial infarction)  I25.2   7. S/P coronary artery stent placement  Z95.5   8. Antiplatelet or antithrombotic long-term use  Z79.02           ASSESSMENT and PLAN:     Chantelle Novakyaya  10/31/1927 female      Chantelle was seen today for follow-up.    Diagnoses and all orders for this visit:    Chronic pain of both knees  -     Referral to Physical Medicine Rehab  -     Referral to Physical Medicine Rehab    Arthritis of knee  -     Referral to Physical Medicine Rehab  -     Referral to Physical Medicine Rehab    Lumbar radiculopathy    Lumbar spondylosis    Chronic bilateral low back pain without sciatica    History of MI (myocardial infarction)    S/P coronary artery stent placement    Antiplatelet or antithrombotic long-term use        Assessment & Plan    Lumbar radiculopathy, lumbar spondylosis are stable and well-controlled.    The patient reports severe, constant, aching pain in both knees, rated 9 out of 10 in intensity, which is worse with standing, walking, and stairs. The pain is non-radiating. She has been receiving steroid injections every 3 months, including Zilretta, but the last injection in the left knee did not provide relief. An ultrasound performed today shows enthesopathy of the quadriceps tendon connection to the  patella and a small suprapatellar effusion in the right knee, and a moderate to large suprapatellar effusion in the left knee. A genicular nerve block is recommended as the initial step in treatment. Orders for the genicular nerve blocks have been placed, starting with the left knee, which is worse. If the nerve block provides temporary relief, a genicular nerve neurotomy (ablation) will be considered for longer-term pain management.    The patient has degenerative changes with arthritis in both knees, confirmed by an x-ray from October 4, 2023. She has been managing the condition with steroid injections and gel supplementation, which are no longer as effective. The genicular nerve block and potential subsequent ablation are aimed at addressing the pain associated with arthritis.    The patient has chronic knee pain for greater than 6 months and failed conservative treatments. I have ordered a right and left  knee genicular nerve block.  If the genicular nerve block is positive then we will plan to proceed with right and left  knee genicular nerve neurotomy.    The risks benefits and alternatives to this procedure were discussed and the patient wishes to proceed with the procedure. Risks include but are not limited to damage to surrounding structures, infection, bleeding, worsening of pain which can be permanent, weakness which can be permanent. Benefits include pain relief, improved function. Alternatives includes not doing the procedure.        Ultrasound today revealed a moderate to large suprapatellar effusion in the left knee. The genicular nerve block will be performed to assess pain relief, and further treatment options will be considered based on the response.    Ultrasound today showed enthesopathy of the quadriceps tendon connection to the patella in the right knee. The genicular nerve block will be performed to assess pain relief, and further treatment options will be considered based on the  response.    Medications: Continue Plavix through the above procedure.  DEA guidelines reviewed.    Follow-up  Return after the nerve block procedure for further evaluation and discussion of potential ablation.        Follow up: After the above diagnostic procedure    Thank you for allowing me to participate in the care of this patient. If you have any questions please not hesitate to contact me.             Please note that this dictation was created using voice recognition software. I have made every reasonable attempt to correct obvious errors but there may be errors of grammar and content that I may have overlooked prior to finalization of this note.      Enrique Louise MD  Interventional Spine and Sports Physiatry  Physical Medicine and Rehabilitation  [facility]

## 2024-11-06 ENCOUNTER — HOSPITAL ENCOUNTER (OUTPATIENT)
Facility: REHABILITATION | Age: 89
End: 2024-11-06
Attending: PHYSICAL MEDICINE & REHABILITATION | Admitting: PHYSICAL MEDICINE & REHABILITATION
Payer: MEDICARE

## 2024-11-06 ENCOUNTER — APPOINTMENT (OUTPATIENT)
Dept: RADIOLOGY | Facility: REHABILITATION | Age: 89
End: 2024-11-06
Attending: PHYSICAL MEDICINE & REHABILITATION
Payer: MEDICARE

## 2024-11-06 VITALS
DIASTOLIC BLOOD PRESSURE: 86 MMHG | HEART RATE: 71 BPM | SYSTOLIC BLOOD PRESSURE: 173 MMHG | TEMPERATURE: 97.2 F | HEIGHT: 60 IN | WEIGHT: 138.45 LBS | BODY MASS INDEX: 27.18 KG/M2 | RESPIRATION RATE: 16 BRPM | OXYGEN SATURATION: 96 %

## 2024-11-06 PROCEDURE — 700101 HCHG RX REV CODE 250

## 2024-11-06 PROCEDURE — 64454 NJX AA&/STRD GNCLR NRV BRNCH: CPT

## 2024-11-06 PROCEDURE — 700111 HCHG RX REV CODE 636 W/ 250 OVERRIDE (IP): Mod: JZ

## 2024-11-06 RX ORDER — LIDOCAINE HYDROCHLORIDE 10 MG/ML
INJECTION, SOLUTION EPIDURAL; INFILTRATION; INTRACAUDAL; PERINEURAL
Status: COMPLETED
Start: 2024-11-06 | End: 2024-11-06

## 2024-11-06 RX ORDER — LIDOCAINE HYDROCHLORIDE 20 MG/ML
INJECTION, SOLUTION EPIDURAL; INFILTRATION; INTRACAUDAL; PERINEURAL
Status: COMPLETED
Start: 2024-11-06 | End: 2024-11-06

## 2024-11-06 RX ADMIN — LIDOCAINE HYDROCHLORIDE 10 ML: 10 INJECTION, SOLUTION EPIDURAL; INFILTRATION; INTRACAUDAL; PERINEURAL at 10:34

## 2024-11-06 RX ADMIN — LIDOCAINE HYDROCHLORIDE 10 ML: 20 INJECTION, SOLUTION EPIDURAL; INFILTRATION; INTRACAUDAL at 10:34

## 2024-11-06 ASSESSMENT — PAIN DESCRIPTION - PAIN TYPE
TYPE: CHRONIC PAIN
TYPE: CHRONIC PAIN

## 2024-11-06 ASSESSMENT — FIBROSIS 4 INDEX: FIB4 SCORE: 1.72

## 2024-11-06 NOTE — OR SURGEON
Immediate Post OP Note    Pre-Op Diagnosis Codes:      * Chronic pain of both knees [M25.561, M25.562, G89.29]     * Arthritis of knee [M17.10]      Post-Op Diagnosis Codes:     * Chronic pain of both knees [M25.561, M25.562, G89.29]     * Arthritis of knee [M17.10]      Procedure(s):  LEFT knee genicular nerve blocks with fluoroscopic guidance     - Wound Class: Clean    Surgeon(s):  Enrique Louise M.D.    Anesthesiologist/Type of Anesthesia:  No anesthesia staff entered./* No anesthesia type entered *    Surgical Staff:  Circulator: Leisa Bello R.N.  Scrub Person: Chichi Osman  Radiology Technologist: Reanna Whitney    Specimens removed if any:  * No specimens in log *    Estimated Blood Loss: None    Findings: None    Complications: None        11/6/2024 11:54 AM Enrique Louise M.D.

## 2024-11-06 NOTE — INTERVAL H&P NOTE
Consented Procedure: LEFT knee genicular nerve blocks with fluoroscopic guidance….Note: OK to continue plavix. DEA guidelines reviewed. Left knee approximately 1 week before the right.  I have examined the patient, provided the risks, benefits, and alternatives to the procedure(s) indicated on the signed consent form, and the patient wishes to proceed.    H&P reviewed. The patient was examined and there are no changes to the H&P      Enrique Louise M.D.  11/06/24 10:11 AM

## 2024-11-06 NOTE — OP REPORT
Patient Name: Chantelle Sánchez 89 y.o. female    MRN: [id number]     Date of Service: 11/6/2024    Physician/s: Enrique Louise MD    Pre-operative Diagnosis: left Knee osteoarthrosis, Knee pain.      Post-operative Diagnosis: left Knee osteoarthrosis, Knee pain.     Procedure: left Knee Genicular Nerve Blocks     Description of procedure:  The risks, benefits, and alternatives of the procedure were reviewed and discussed with the patient.  Written informed consent was freely obtained. A pre-procedural time-out was conducted by the physician verifying patient’s identity, procedure to be performed, procedure site and side, and allergy verification. Appropriate equipment was determined to be in place for the procedure.          The patient's vital signs were carefully monitored before, throughout, and after the procedure.    In the fluoroscopy suite the patient was placed in a supine position, the knee was flexed with a pillow/towels underneath for support, and the skin was prepped and draped in the usual sterile fashion. The fluoroscope was placed over the left knee at an true AP position, and THREE targets for injection were marked superior lateral genicular nerve adjacent to the femoral metaphysis on the lateral aspect of the middle third of the, superior medial genicular nerve adjacent to the femoral  metaphysis on the medial aspect and in the middle third of the lateral view, inferior medial and medial tibial metaphysis on the AP view and the middle third on the lateral view. A 25g spinal needle was then advanced into the superior lateral, superior medial, inferior medial, and superior midline target points for the genicular nerves. The needle tips were then verified by AP and lateral views.  Following negative aspiration, 1mL of 2% lidocaine was then injected at the above levels, and the needles were removed intact after restyleted. The patient's knee was covered with a 4x4 gauze, the area was cleansed with  sterile normal saline, and a dressing was applied.     There were no complications noted, was hemodynamically stable, and tolerated the procedure well.     The patient had 100% pain relief postprocedure  Preprocedure pain 8/10 NRS  Postprocedure pain 0 /10 NRS     Follow-up as scheduled    Enrique Louise MD  Interventional Spine and Pain  Physical Medicine and Rehabilitation  [facility]           CPT  Suprapatellar genicular nerve block, Superomedial genicular nerve block, Inferomedial genicular nerve block [id number]

## 2024-11-07 ENCOUNTER — TELEPHONE (OUTPATIENT)
Dept: PHYSICAL MEDICINE AND REHAB | Facility: MEDICAL CENTER | Age: 89
End: 2024-11-07
Payer: MEDICARE

## 2024-11-07 NOTE — TELEPHONE ENCOUNTER
Called for Post -SP check up:RIGHT left knee genicular nerve blocks with fluoroscopic guidance     Change in pain: slight improvement    Concerns? No    Confirmed [facility] appt? yes

## 2024-11-08 ENCOUNTER — HOSPITAL ENCOUNTER (OUTPATIENT)
Facility: REHABILITATION | Age: 89
End: 2024-11-08
Attending: PHYSICAL MEDICINE & REHABILITATION | Admitting: PHYSICAL MEDICINE & REHABILITATION
Payer: MEDICARE

## 2024-11-11 DIAGNOSIS — K21.9 GASTROESOPHAGEAL REFLUX DISEASE, UNSPECIFIED WHETHER ESOPHAGITIS PRESENT: ICD-10-CM

## 2024-11-12 RX ORDER — LANSOPRAZOLE 30 MG/1
30 CAPSULE, DELAYED RELEASE ORAL
Qty: 90 CAPSULE | Refills: 2 | Status: SHIPPED | OUTPATIENT
Start: 2024-11-12

## 2024-11-21 ENCOUNTER — PATIENT MESSAGE (OUTPATIENT)
Dept: PHYSICAL MEDICINE AND REHAB | Facility: MEDICAL CENTER | Age: 89
End: 2024-11-21
Payer: MEDICARE

## 2024-11-22 NOTE — TELEPHONE ENCOUNTER
If she had less than 50% relief from the genicular nerve block, she should follow-up next with Dr. Louise in clinic for further evaluation and discussion.

## 2024-11-25 NOTE — PATIENT COMMUNICATION
Spoke to Pt daughter and notified that will cancelled the Knee neurotomy and keep the appt on 12/12/24.  Pt daughter agreed.    Thank you  Lesli

## 2024-12-12 ENCOUNTER — APPOINTMENT (OUTPATIENT)
Dept: PHYSICAL MEDICINE AND REHAB | Facility: MEDICAL CENTER | Age: 89
End: 2024-12-12
Payer: MEDICARE

## 2024-12-12 VITALS
OXYGEN SATURATION: 96 % | TEMPERATURE: 97.4 F | SYSTOLIC BLOOD PRESSURE: 143 MMHG | HEART RATE: 75 BPM | WEIGHT: 142.8 LBS | HEIGHT: 60 IN | DIASTOLIC BLOOD PRESSURE: 69 MMHG | BODY MASS INDEX: 28.03 KG/M2

## 2024-12-12 DIAGNOSIS — G89.29 CHRONIC PAIN OF BOTH KNEES: ICD-10-CM

## 2024-12-12 DIAGNOSIS — M47.816 LUMBAR SPONDYLOSIS: ICD-10-CM

## 2024-12-12 DIAGNOSIS — M54.50 CHRONIC BILATERAL LOW BACK PAIN WITHOUT SCIATICA: ICD-10-CM

## 2024-12-12 DIAGNOSIS — M25.561 CHRONIC PAIN OF BOTH KNEES: ICD-10-CM

## 2024-12-12 DIAGNOSIS — M17.10 ARTHRITIS OF KNEE: ICD-10-CM

## 2024-12-12 DIAGNOSIS — G89.29 CHRONIC BILATERAL LOW BACK PAIN WITHOUT SCIATICA: ICD-10-CM

## 2024-12-12 DIAGNOSIS — M25.562 CHRONIC PAIN OF BOTH KNEES: ICD-10-CM

## 2024-12-12 DIAGNOSIS — M54.16 LUMBAR RADICULOPATHY: ICD-10-CM

## 2024-12-12 PROCEDURE — 3078F DIAST BP <80 MM HG: CPT | Performed by: PHYSICAL MEDICINE & REHABILITATION

## 2024-12-12 PROCEDURE — 1125F AMNT PAIN NOTED PAIN PRSNT: CPT | Performed by: PHYSICAL MEDICINE & REHABILITATION

## 2024-12-12 PROCEDURE — 99214 OFFICE O/P EST MOD 30 MIN: CPT | Performed by: PHYSICAL MEDICINE & REHABILITATION

## 2024-12-12 PROCEDURE — 3077F SYST BP >= 140 MM HG: CPT | Performed by: PHYSICAL MEDICINE & REHABILITATION

## 2024-12-12 ASSESSMENT — PATIENT HEALTH QUESTIONNAIRE - PHQ9
CLINICAL INTERPRETATION OF PHQ2 SCORE: 1
SUM OF ALL RESPONSES TO PHQ QUESTIONS 1-9: 5
5. POOR APPETITE OR OVEREATING: 0 - NOT AT ALL

## 2024-12-12 ASSESSMENT — FIBROSIS 4 INDEX: FIB4 SCORE: 1.72

## 2024-12-12 ASSESSMENT — PAIN SCALES - GENERAL: PAINLEVEL_OUTOF10: 9=SEVERE PAIN

## 2024-12-12 NOTE — PROGRESS NOTES
Follow up patient note  Interventional spine and Pain  Physiatry (physical medicine and  Rehabilitation)       Chief complaint:   Chief Complaint   Patient presents with    Follow-Up     Knee pain          HISTORY    Please see new patient note by Dr Louise,  for more details.     HPI  Patient identification: Chantelle Sánchez ,  10/31/1927,   With Diagnoses of Chronic pain of both knees and Arthritis of knee were pertinent to this visit.     Procedures   Status post lumbar L5-S1 interlaminar epidural steroid injection which I performed on 2024 with 90% pain relief overall.  Preprocedure pain 7/10 NRS postprocedure pain 1/10 NRS.      History of Present Illness  The patient is a 89-year-old female presenting for a follow-up visit, accompanied by her daughter, who assists in providing the history of present illness.    The patient reports chronic bilateral knee pain, described as aching in quality, with an intensity of 9 out of 10, constant in nature, and persisting for many years. She has not responded to conservative treatments, including a provider-directed home exercise program over the past six months. Due to her advanced age, she is not prescribed NSAIDs or muscle relaxants. Previously, she received intra-articular corticosteroid injections every three months, but the most recent injection failed to provide relief. She underwent a left knee genicular nerve block on 2024. Post-procedure, she had a difficult time telling if there was pain relief in the left knee given pain in the right knee and difficulty with walking.  Despite this, the patient noted an improvement in her ability to rise from the table post-procedure. The daughter also inquired about the potential use of a TENS (Transcutaneous Electrical Nerve Stimulation) unit for pain management.    MEDICATIONS  - Tramadol (current)      Patient is done a provider driven home exercise program in the past including the past 6 months.    Not on  NSAIDs because of age.  Not on muscle relaxers because of age and she has tried acetaminophen.         ROS Red Flags :   Fever, Chills, Sweats: Denies  Involuntary Weight Loss: Denies  Bowel/Bladder Incontinence: Denies  Saddle Anesthesia: Denies        PMHx:   Past Medical History:   Diagnosis Date    History of heart artery stent     History of heart attack     Hyperlipidemia     Hypertension     Osteoarthritis     (B)       PSHx:   Past Surgical History:   Procedure Laterality Date    [place] INJ AA/STRD GNCLR NRV BRNCH W/IMG Left 11/6/2024    Procedure: LEFT knee genicular nerve blocks with fluoroscopic guidance….Note: OK to continue plavix. DEA guidelines reviewed. Left knee approximately 1 week before the right.;  Surgeon: Enrique Louise M.D.;  [facility];  Service: Pain Management    WA INJ LUMBAR/SACRAL,W/ IMAGING N/A 4/23/2024    Procedure: Lumbar L5-S1 interlaminar epidural steroid injection…..Note: Patient needs clearance to be off of Plavix for 5 days prior to the procedure;  Surgeon: Enrique Louise M.D.;  [facility];  Service: Pain Management    CARPAL TUNNEL RELEASE      CATARACT EXTRACTION WITH IOL      KNEE ARTHROSCOPY      OTHER ORTHOPEDIC SURGERY      toe    RHINOPLASTY         Family history   History reviewed. No pertinent family history.      Medications:   Outpatient Medications Marked as Taking for the 12/12/24 encounter (Office Visit) with Enrique Louise M.D.   Medication Sig Dispense Refill    lansoprazole (PREVACID) 30 MG CAPSULE DELAYED RELEASE TAKE 1 CAPSULE BY MOUTH EVERY DAY 90 Capsule 2    Dextromethorphan-guaiFENesin (MUCINEX DM)  MG TABLET SR 12 HR Take 1 Each by mouth 3 times a day as needed (cough). 30 Tablet 0    ipratropium-albuterol (DUONEB) 0.5-2.5 (3) MG/3ML nebulizer solution Take 3 mL by nebulization every 6 hours as needed for Shortness of Breath (wheezing). 30 Each 0    albuterol 108 (90 Base) MCG/ACT Aero  Soln inhalation aerosol Inhale 2 Puffs every four hours as needed for Shortness of Breath. 1 Each 1    fluticasone (FLONASE) 50 MCG/ACT nasal spray Administer 1 Spray into affected nostril(S) every day.      cetirizine (ZYRTEC) 10 MG Tab Take 10 mg by mouth every day.      clopidogrel (PLAVIX) 75 MG Tab       metoprolol SR (TOPROL XL) 50 MG TABLET SR 24 HR Take 50 mg by mouth every day.      atorvastatin (LIPITOR) 80 MG tablet Take 80 mg by mouth every evening.          Current Outpatient Medications on File Prior to Visit   Medication Sig Dispense Refill    lansoprazole (PREVACID) 30 MG CAPSULE DELAYED RELEASE TAKE 1 CAPSULE BY MOUTH EVERY DAY 90 Capsule 2    Dextromethorphan-guaiFENesin (MUCINEX DM)  MG TABLET SR 12 HR Take 1 Each by mouth 3 times a day as needed (cough). 30 Tablet 0    ipratropium-albuterol (DUONEB) 0.5-2.5 (3) MG/3ML nebulizer solution Take 3 mL by nebulization every 6 hours as needed for Shortness of Breath (wheezing). 30 Each 0    albuterol 108 (90 Base) MCG/ACT Aero Soln inhalation aerosol Inhale 2 Puffs every four hours as needed for Shortness of Breath. 1 Each 1    fluticasone (FLONASE) 50 MCG/ACT nasal spray Administer 1 Spray into affected nostril(S) every day.      cetirizine (ZYRTEC) 10 MG Tab Take 10 mg by mouth every day.      clopidogrel (PLAVIX) 75 MG Tab       metoprolol SR (TOPROL XL) 50 MG TABLET SR 24 HR Take 50 mg by mouth every day.      atorvastatin (LIPITOR) 80 MG tablet Take 80 mg by mouth every evening.       No current facility-administered medications on file prior to visit.         Allergies:   No Known Allergies    Social Hx:   Social History     Socioeconomic History    Marital status:      Spouse name: Not on file    Number of children: Not on file    Years of education: Not on file    Highest education level: Not on file   Occupational History    Not on file   Tobacco Use    Smoking status: Never    Smokeless tobacco: Never   Vaping Use    Vaping  status: Never Used   Substance and Sexual Activity    Alcohol use: Not Currently    Drug use: Not Currently    Sexual activity: Not Currently   Other Topics Concern    Not on file   Social History Narrative    Not on file     Social Drivers of Health     Financial Resource Strain: Not on file   Food Insecurity: Not on file   Transportation Needs: Not on file   Physical Activity: Not on file   Stress: Not on file   Social Connections: Not on file   Intimate Partner Violence: Not on file   Housing Stability: Not on file         EXAMINATION     Physical Exam:   Vitals: BP (!) 143/69 (BP Location: Left arm, Patient Position: Sitting, BP Cuff Size: Large adult)   Pulse 75   Temp 36.3 °C (97.4 °F) (Temporal)   Ht 1.524 m (5')   Wt 64.8 kg (142 lb 12.8 oz)   SpO2 96%     Constitutional:   Body Habitus: Body mass index is 27.89 kg/m².  Cooperation: Fully cooperates with exam  Appearance: Well-groomed no disheveled    Respiratory-  breathing comfortable on room air, no audible wheezing  Cardiovascular- capillary refills less than 2 seconds. No lower extremity edema is noted.   Psychiatric- alert and oriented ×3. Normal affect.    MSK and Neuro: -    Physical Exam          BILATERAL  Knee:   Inspection suprapatellar effusions are present.  No other swelling, rashes   Palpation positive mild tenderness palpation of the medial and lateral joint line.  No significant tenderness to palpation throughout the knee including the  quadriceps tendon, patellar tendon, patellar, medial patellar facets, lateral patellar facets, tibial tuberosity, fibular head.  Range of motion normal range of motion in flexion and extension  Special tests:   Varus stress tests: Negative  Valgus stress tests: Negative  Lockman's test: Negative  Anterior drawer: Negative  Posterior drawer: Negative  Elvis's: negative             MEDICAL DECISION MAKING    DATA    Labs:   Lab Results   Component Value Date/Time    SODIUM 139 01/23/2024 02:14 AM     "POTASSIUM 3.9 01/23/2024 02:14 AM    CHLORIDE 104 01/23/2024 02:14 AM    CO2 22 01/23/2024 02:14 AM    GLUCOSE 97 01/23/2024 02:14 AM    BUN 23 (H) 01/23/2024 02:14 AM    CREATININE 0.62 01/23/2024 02:14 AM        No results found for: \"PROTHROMBTM\", \"INR\"     Lab Results   Component Value Date/Time    WBC 11.2 (H) 01/23/2024 02:14 AM    RBC 4.24 01/23/2024 02:14 AM    HEMOGLOBIN 13.3 01/23/2024 02:14 AM    HEMATOCRIT 40.4 01/23/2024 02:14 AM    MCV 95.3 01/23/2024 02:14 AM    MCH 31.4 01/23/2024 02:14 AM    MCHC 32.9 01/23/2024 02:14 AM    MPV 9.4 01/23/2024 02:14 AM    NEUTSPOLYS 60.00 01/22/2024 07:15 PM    LYMPHOCYTES 27.60 01/22/2024 07:15 PM    MONOCYTES 9.70 01/22/2024 07:15 PM    EOSINOPHILS 1.10 01/22/2024 07:15 PM    BASOPHILS 0.50 01/22/2024 07:15 PM        No results found for: \"HBA1C\"       Imaging:   I personally reviewed following images      Results  Imaging  X-ray from October 4, 2023 shows degenerative changes with arthritis in the bilateral knees.     10/17/2024 I performed a Limited diagnostic ultrasound of the bilateral knees shows enthesopathy of the connection of the quadriceps tendon to the patella on the right knee, a small suprapatellar effusion on the right knee, and a moderate to large size suprapatellar effusion in the left knee.         I reviewed the following radiology reports          Results for orders placed during the hospital encounter of 03/08/24    MR-LUMBAR SPINE-W/O    Impression  1.  Degenerative disease as described above.  2.  There is severe right L4 foraminal stenosis with nerve root impingement.  3.  Infrarenal abdominal aortic aneurysm.                                Results for orders placed in visit on 01/02/24    DX-CHEST-2 VIEWS    Impression  NEGATIVE TWO VIEWS OF THE CHEST.                     Results for orders placed in visit on 10/04/23    DX-KNEE COMPLETE 4+ BILATERAL    Results for orders placed during the hospital encounter of 03/08/24    DX-LUMBAR SPINE-4+ " VIEWS    Impression  1.  Multilevel disc and facet joint degenerative changes.  2.  Osteopenia without evidence displaced fracture.  3.  No evidence of dynamic instability.  4.  3.6 cm infrarenal abdominal aortic aneurysm.                        DIAGNOSIS   Visit Diagnoses     ICD-10-CM   1. Chronic pain of both knees  M25.561    M25.562    G89.29   2. Arthritis of knee  M17.10           ASSESSMENT and PLAN:     Chantelle Sánchez  10/31/1927 female      Chantelle was seen today for follow-up.    Diagnoses and all orders for this visit:    Chronic pain of both knees  -     Referral to Physical Medicine Rehab    Arthritis of knee  -     Referral to Physical Medicine Rehab        Assessment & Plan  Bilateral knee pain.  The patient has chronic bilateral knee pain, with a history of failed conservative treatments and previous steroid injections that are no longer effective. She is status post left knee genicular nerve block from 2024, which provided temporary relief.  However she had a difficult time telling how much pain relief there was because of right knee pain and this causing a challenge with the diagnostic phase.  A genicular nerve block will be administered to both knees simultaneously to assess for potential ablation candidacy. She is advised to use a TENS unit for additional pain management.  Medications: Acetaminophen up to 1000 mg 3 times daily as needed not to exceed 3000 mg per 24-hours.  Avoid NSAIDs given age and of the patient is on Plavix.    Okay to continue Plavix through the genicular nerve blocks.  DEA guidelines reviewed.    PROCEDURE  The patient received a steroid injection every 3 months in the past, with the last injection providing no relief. She is status post a left knee genicular nerve block from 2024.          Follow up: After the above diagnostic procedure    Thank you for allowing me to participate in the care of this patient. If you have any questions please not  hesitate to contact me.             Please note that this dictation was created using voice recognition software. I have made every reasonable attempt to correct obvious errors but there may be errors of grammar and content that I may have overlooked prior to finalization of this note.      Enrique Louise MD  Interventional Spine and Sports Physiatry  Physical Medicine and Rehabilitation  [facility]

## 2025-01-14 ENCOUNTER — HOSPITAL ENCOUNTER (OUTPATIENT)
Facility: REHABILITATION | Age: OVER 89
End: 2025-01-14
Attending: PHYSICAL MEDICINE & REHABILITATION | Admitting: PHYSICAL MEDICINE & REHABILITATION
Payer: MEDICARE

## 2025-01-14 ENCOUNTER — APPOINTMENT (OUTPATIENT)
Dept: RADIOLOGY | Facility: REHABILITATION | Age: OVER 89
End: 2025-01-14
Attending: PHYSICAL MEDICINE & REHABILITATION
Payer: MEDICARE

## 2025-01-14 VITALS
SYSTOLIC BLOOD PRESSURE: 168 MMHG | HEIGHT: 60 IN | TEMPERATURE: 97.5 F | BODY MASS INDEX: 28.31 KG/M2 | DIASTOLIC BLOOD PRESSURE: 82 MMHG | RESPIRATION RATE: 16 BRPM | HEART RATE: 65 BPM | OXYGEN SATURATION: 92 % | WEIGHT: 144.18 LBS

## 2025-01-14 PROCEDURE — 700111 HCHG RX REV CODE 636 W/ 250 OVERRIDE (IP): Mod: JZ

## 2025-01-14 PROCEDURE — 64454 NJX AA&/STRD GNCLR NRV BRNCH: CPT

## 2025-01-14 PROCEDURE — 700101 HCHG RX REV CODE 250

## 2025-01-14 RX ORDER — LIDOCAINE HYDROCHLORIDE 10 MG/ML
INJECTION, SOLUTION EPIDURAL; INFILTRATION; INTRACAUDAL; PERINEURAL
Status: COMPLETED
Start: 2025-01-14 | End: 2025-01-14

## 2025-01-14 RX ORDER — LIDOCAINE HYDROCHLORIDE 20 MG/ML
INJECTION, SOLUTION EPIDURAL; INFILTRATION; INTRACAUDAL; PERINEURAL
Status: COMPLETED
Start: 2025-01-14 | End: 2025-01-14

## 2025-01-14 RX ADMIN — LIDOCAINE HYDROCHLORIDE 10 ML: 20 INJECTION, SOLUTION EPIDURAL; INFILTRATION; INTRACAUDAL; PERINEURAL at 10:26

## 2025-01-14 RX ADMIN — LIDOCAINE HYDROCHLORIDE 10 ML: 10 INJECTION, SOLUTION EPIDURAL; INFILTRATION; INTRACAUDAL; PERINEURAL at 10:26

## 2025-01-14 ASSESSMENT — PAIN DESCRIPTION - PAIN TYPE
TYPE: CHRONIC PAIN
TYPE: CHRONIC PAIN

## 2025-01-14 ASSESSMENT — FIBROSIS 4 INDEX: FIB4 SCORE: 1.72

## 2025-01-14 NOTE — H&P
Physical medicine and rehabilitation preprocedure history & Physical Note    Date  1/14/2025    Primary Care Physician  Tali López M.D.    CC  Pre-Op Diagnosis Codes:      * Chronic pain of both knees [M25.561, M25.562, G89.29]     * Arthritis of knee [M17.10]     HPI  This is a 89 y.o. female who presented with chronic bilateral knee pain present for greater than 6 months failed conservative treatments here for genicular nerve blocks    See previous notes of Dr. Louise    Past Medical History:   Diagnosis Date    History of heart artery stent     History of heart attack     Hyperlipidemia     Hypertension     Osteoarthritis     (B)       Past Surgical History:   Procedure Laterality Date    SD INJ AA/STRD GNCLR NRV BRNCH W/IMG Left 11/6/2024    Procedure: LEFT knee genicular nerve blocks with fluoroscopic guidance….Note: OK to continue plavix. DEA guidelines reviewed. Left knee approximately 1 week before the right.;  Surgeon: Enrique Louise M.D.;  [facility];  Service: Pain Management    SD INJ LUMBAR/SACRAL,W/ IMAGING N/A 4/23/2024    Procedure: Lumbar L5-S1 interlaminar epidural steroid injection…..Note: Patient needs clearance to be off of Plavix for 5 days prior to the procedure;  Surgeon: Enrique Louise M.D.;  Location: [facility];  Service: Pain Management    CARPAL TUNNEL RELEASE      CATARACT EXTRACTION WITH IOL      KNEE ARTHROSCOPY      OTHER ORTHOPEDIC SURGERY      toe    RHINOPLASTY         No current facility-administered medications for this encounter.       Social History     Socioeconomic History    Marital status:      Spouse name: Not on file    Number of children: Not on file    Years of education: Not on file    Highest education level: Not on file   Occupational History    Not on file   Tobacco Use    Smoking status: Never    Smokeless tobacco: Never   Vaping Use    Vaping status: Never Used   Substance and Sexual Activity     Alcohol use: Not Currently    Drug use: Not Currently    Sexual activity: Not Currently   Other Topics Concern    Not on file   Social History Narrative    Not on file     Social Drivers of Health     Financial Resource Strain: Not on file   Food Insecurity: Not on file   Transportation Needs: Not on file   Physical Activity: Not on file   Stress: Not on file   Social Connections: Not on file   Intimate Partner Violence: Not on file   Housing Stability: Not on file       History reviewed. No pertinent family history.    Allergies  Patient has no known allergies.    Review of Systems  Comprehensive review of systems was negative       Physical Exam  Constitutional:       General: She is not in acute distress.     Appearance: Normal appearance. She is not ill-appearing, toxic-appearing or diaphoretic.   Cardiovascular:      Rate and Rhythm: Normal rate and regular rhythm.      Pulses: Normal pulses.      Heart sounds: Normal heart sounds.   Pulmonary:      Effort: Pulmonary effort is normal.      Breath sounds: Normal breath sounds.   Abdominal:      General: Abdomen is flat. Bowel sounds are normal. There is no distension.      Palpations: Abdomen is soft.      Tenderness: There is no abdominal tenderness. There is no guarding or rebound.   Skin:     General: Skin is warm and dry.      Capillary Refill: Capillary refill takes less than 2 seconds.   Neurological:      Mental Status: She is alert.          Vital Signs  Blood Pressure : (!) 156/84   Temperature: 36.4 °C (97.5 °F)   Pulse: 68   Respiration: 16   Pulse Oximetry: 94 %     Vitals reviewed    Labs:                    Radiology:  DX-PORTABLE FLUOROSCOPY < 1 HOUR Reason For Exam: pain    (Results Pending)         Assessment/Plan:  Pre-Op Diagnosis Codes:      * Chronic pain of both knees [M25.561, M25.562, G89.29]     * Arthritis of knee [M17.10]  Procedure(s):  bilateral knee genicular nerve blocks with fluoroscopic guidance

## 2025-01-14 NOTE — OP REPORT
Patient Name: Chantelle Sánchez 89 y.o. female    MRN: [id number]     Date of Service: 1/14/2025    Physician/s: Enrique Louise MD    Pre-operative Diagnosis: bilateral Knee osteoarthrosis, Knee pain.      Post-operative Diagnosis: bilateral Knee osteoarthrosis, Knee pain.     Procedure: bilateral Knee Genicular Nerve Blocks     Description of procedure:  The risks, benefits, and alternatives of the procedure were reviewed and discussed with the patient.  Written informed consent was freely obtained. A pre-procedural time-out was conducted by the physician verifying patient’s identity, procedure to be performed, procedure site and side, and allergy verification. Appropriate equipment was determined to be in place for the procedure.          The patient's vital signs were carefully monitored before, throughout, and after the procedure.    In the fluoroscopy suite the patient was placed in a supine position, the knee was flexed with a pillow/towels underneath for support, and the skin was prepped and draped in the usual sterile fashion. The fluoroscope was placed over the right knee at an true AP position, and THREE targets for injection were marked superior lateral genicular nerve adjacent to the femoral metaphysis on the lateral aspect of the middle third of the, superior medial genicular nerve adjacent to the femoral  metaphysis on the medial aspect and in the middle third of the lateral view, inferior medial and medial tibial metaphysis on the AP view and the middle third on the lateral view. A 25g spinal needle was then advanced into the superior lateral, superior medial, inferior medial, and superior midline target points for the genicular nerves. The needle tips were then verified by AP and lateral views.  Following negative aspiration, 1mL of 2% lidocaine was then injected at the above levels, and the needles were removed intact after restyleted. The patient's knee was covered with a 4x4 gauze, the area was  cleansed with sterile normal saline, and a dressing was applied.     In the fluoroscopy suite the patient was placed in a supine position, the knee was flexed with a pillow/towels underneath for support, and the skin was prepped and draped in the usual sterile fashion. The fluoroscope was placed over the left knee at an true AP position, and THREE targets for injection were marked superior lateral genicular nerve adjacent to the femoral metaphysis on the lateral aspect of the middle third of the, superior medial genicular nerve adjacent to the femoral  metaphysis on the medial aspect and in the middle third of the lateral view, inferior medial and medial tibial metaphysis on the AP view and the middle third on the lateral view. A 25g spinal needle was then advanced into the superior lateral, superior medial, inferior medial, and superior midline target points for the genicular nerves. The needle tips were then verified by AP and lateral views.  Following negative aspiration, 1mL of 2% lidocaine was then injected at the above levels, and the needles were removed intact after restyleted. The patient's knee was covered with a 4x4 gauze, the area was cleansed with sterile normal saline, and a dressing was applied.     There were no complications noted, was hemodynamically stable, and tolerated the procedure well.     The patient had 100% pain relief postprocedure  Preprocedure pain 8/10 NRS  Postprocedure pain 0 /10 NRS     Follow-up as scheduled    Enrique Louise MD  Interventional Spine and Pain  Physical Medicine and Rehabilitation  [facility]           CPT  Suprapatellar genicular nerve block, Superomedial genicular nerve block, Inferomedial genicular nerve block [id number]

## 2025-01-14 NOTE — OR SURGEON
Immediate Post OP Note    Pre-Op Diagnosis Codes:      * Chronic pain of both knees [M25.561, M25.562, G89.29]     * Arthritis of knee [M17.10]      Post-Op Diagnosis Codes:     * Chronic pain of both knees [M25.561, M25.562, G89.29]     * Arthritis of knee [M17.10]      Procedure(s):  bilateral knee genicular nerve blocks with fluoroscopic guidance - Wound Class: Clean    Surgeon(s):  Enrique Louise M.D.    Anesthesiologist/Type of Anesthesia:  No anesthesia staff entered./Local    Surgical Staff:  Circulator: Leisa Bello R.N.  Scrub Person: Chichi Osman  Radiology Technologist: Reanna Whitney    Specimens removed if any:  * No specimens in log *    Estimated Blood Loss: None    Findings: None    Complications: None        1/14/2025 10:53 AM Enrique Louise M.D.

## 2025-01-16 ENCOUNTER — TELEPHONE (OUTPATIENT)
Dept: PHYSICAL MEDICINE AND REHAB | Facility: MEDICAL CENTER | Age: OVER 89
End: 2025-01-16
Payer: MEDICARE

## 2025-01-16 NOTE — TELEPHONE ENCOUNTER
Called for post-sp check-up. Pt reported the following regarding the procedure site:  bilateral knee genicular nerve blocks with fluoroscopic guidance     Change in pain?: Yes    Concerns?: No    Confirmed [facility] appt?: Yes

## 2025-01-21 ENCOUNTER — TELEMEDICINE (OUTPATIENT)
Dept: PHYSICAL MEDICINE AND REHAB | Facility: MEDICAL CENTER | Age: OVER 89
End: 2025-01-21
Payer: MEDICARE

## 2025-01-21 VITALS — BODY MASS INDEX: 28.27 KG/M2 | WEIGHT: 144 LBS | HEIGHT: 60 IN

## 2025-01-21 DIAGNOSIS — Z95.5 S/P CORONARY ARTERY STENT PLACEMENT: ICD-10-CM

## 2025-01-21 DIAGNOSIS — I25.2 HISTORY OF MI (MYOCARDIAL INFARCTION): ICD-10-CM

## 2025-01-21 DIAGNOSIS — M54.50 CHRONIC BILATERAL LOW BACK PAIN WITHOUT SCIATICA: ICD-10-CM

## 2025-01-21 DIAGNOSIS — G89.29 CHRONIC BILATERAL LOW BACK PAIN WITHOUT SCIATICA: ICD-10-CM

## 2025-01-21 DIAGNOSIS — G89.29 CHRONIC PAIN OF BOTH KNEES: ICD-10-CM

## 2025-01-21 DIAGNOSIS — Z79.02 ANTIPLATELET OR ANTITHROMBOTIC LONG-TERM USE: ICD-10-CM

## 2025-01-21 DIAGNOSIS — M25.561 CHRONIC PAIN OF BOTH KNEES: ICD-10-CM

## 2025-01-21 DIAGNOSIS — M54.16 LUMBAR RADICULOPATHY: ICD-10-CM

## 2025-01-21 DIAGNOSIS — M17.10 ARTHRITIS OF KNEE: ICD-10-CM

## 2025-01-21 DIAGNOSIS — M25.562 CHRONIC PAIN OF BOTH KNEES: ICD-10-CM

## 2025-01-21 DIAGNOSIS — M47.816 LUMBAR SPONDYLOSIS: ICD-10-CM

## 2025-01-21 PROCEDURE — G2211 COMPLEX E/M VISIT ADD ON: HCPCS | Mod: 24,95 | Performed by: PHYSICAL MEDICINE & REHABILITATION

## 2025-01-21 PROCEDURE — 99214 OFFICE O/P EST MOD 30 MIN: CPT | Mod: 95 | Performed by: PHYSICAL MEDICINE & REHABILITATION

## 2025-01-21 ASSESSMENT — PATIENT HEALTH QUESTIONNAIRE - PHQ9
CLINICAL INTERPRETATION OF PHQ2 SCORE: 3
5. POOR APPETITE OR OVEREATING: 0 - NOT AT ALL
SUM OF ALL RESPONSES TO PHQ QUESTIONS 1-9: 7

## 2025-01-21 ASSESSMENT — FIBROSIS 4 INDEX: FIB4 SCORE: 1.72

## 2025-01-21 ASSESSMENT — PAIN SCALES - GENERAL: PAINLEVEL_OUTOF10: 9=SEVERE PAIN

## 2025-01-21 NOTE — H&P (VIEW-ONLY)
Follow up patient note  Interventional spine and Pain  Physiatry (physical medicine and  Rehabilitation)       Chief complaint:   Chief Complaint   Patient presents with    Follow-Up     Knee pain          HISTORY    Please see new patient note by Dr Louise,  for more details.     HPI  Patient identification: Chantelle Sánchez ,  10/31/1927,   With Diagnoses of Chronic pain of both knees, Arthritis of knee, Lumbar radiculopathy, Lumbar spondylosis, Chronic bilateral low back pain without sciatica, History of MI (myocardial infarction), S/P coronary artery stent placement, and Antiplatelet or antithrombotic long-term use were pertinent to this visit.     Procedures   lumbar L5-S1 interlaminar epidural steroid injection which I performed on 2024 with 90% pain relief overall.  Preprocedure pain 7/10 NRS postprocedure pain 1/10 NRS.    2025 bilateral knee genicular nerve blocks preprocedure pain 8 out of 10 on numeric rating scale postprocedure pain 0 out of 10 on numeric rating scale.  100% pain relief during the diagnostic phase.  Pain returned as expected after the diagnostic phase.      This encounter was conducted via secure encrypted technology using Teams videoconferencing.   The patient was in a private location in the [facility]  POS 10 - telehealth provided in patient's home   Verbal consent was obtained. Patient's identity was verified.     History of Present Illness  From the previous visit   the patient is a 89-year-old female presenting for a follow-up visit, accompanied by her daughter, who assists in providing the history of present illness.    The patient reports chronic bilateral knee pain, described as aching in quality, with an intensity of 9 out of 10, constant in nature, and persisting for many years. She has not responded to conservative treatments, including a provider-directed home exercise program over the past six months. Due to her advanced age, she is not prescribed  NSAIDs or muscle relaxants. Previously, she received intra-articular corticosteroid injections every three months, but the most recent injection failed to provide relief. She underwent a left knee genicular nerve block on 11/16/2024. Post-procedure, she had a difficult time telling if there was pain relief in the left knee given pain in the right knee and difficulty with walking.  Despite this, the patient noted an improvement in her ability to rise from the table post-procedure. The daughter also inquired about the potential use of a TENS (Transcutaneous Electrical Nerve Stimulation) unit for pain management.    From this visit  The patient had excellent improvement with 100% pain relief of the bilateral knee pain during the genicular nerve blocks after the diagnostic phase the pain returned back to baseline as expected after the nerve block.  Her pain is currently 8 out of 10 intensity chronic constant present for many years overall tried conservative treatments including the past 6 months.  She is interested in nerve ablation.  She is not interested in replacement surgery given age.    MEDICATIONS  - Tramadol (current)    Conservative treatments including the past two months within the past six months  NSAIDs: no because of age  Acetaminophen: yes  Home exercise program or physical therapy: yes  Activity modification: yes    Patient is done a provider driven home exercise program in the past including the past 6 months.    Not on NSAIDs because of age.  Not on muscle relaxers because of age and she has tried acetaminophen.         ROS Red Flags :   Fever, Chills, Sweats: Denies  Involuntary Weight Loss: Denies  Bowel/Bladder Incontinence: Denies  Saddle Anesthesia: Denies        PMHx:   Past Medical History:   Diagnosis Date    History of heart artery stent     History of heart attack     Hyperlipidemia     Hypertension     Osteoarthritis     (B)       PSHx:   Past Surgical History:   Procedure Laterality Date     UT INJ AA/STRD GNCLR NRV BRNCH W/IMG Bilateral 1/14/2025    Procedure: bilateral knee genicular nerve blocks with fluoroscopic guidance;  Surgeon: Enrique Louise M.D.;  [facility];  Service: Pain Management    UT INJ AA/STRD GNCLR NRV BRNCH W/IMG Left 11/6/2024    Procedure: LEFT knee genicular nerve blocks with fluoroscopic guidance….Note: OK to continue plavix. DEA guidelines reviewed. Left knee approximately 1 week before the right.;  Surgeon: Enrique Louise M.D.;  Location: [facility];  Service: Pain Management    UT INJ LUMBAR/SACRAL,W/ IMAGING N/A 4/23/2024    Procedure: Lumbar L5-S1 interlaminar epidural steroid injection…..Note: Patient needs clearance to be off of Plavix for 5 days prior to the procedure;  Surgeon: Enrique Louise M.D.;  Location: [facility];  Service: Pain Management    CARPAL TUNNEL RELEASE      CATARACT EXTRACTION WITH IOL      KNEE ARTHROSCOPY      OTHER ORTHOPEDIC SURGERY      toe    RHINOPLASTY         Family history   History reviewed. No pertinent family history.      Medications:   Outpatient Medications Marked as Taking for the 1/21/25 encounter (Telemedicine) with Enrique Louise M.D.   Medication Sig Dispense Refill    traMADol (ULTRAM) 50 MG Tab Take 1 Tablet by mouth every 6 hours as needed for Moderate Pain for up to 30 days. 120 Tablet 0    lansoprazole (PREVACID) 30 MG CAPSULE DELAYED RELEASE TAKE 1 CAPSULE BY MOUTH EVERY DAY 90 Capsule 2    Dextromethorphan-guaiFENesin (MUCINEX DM)  MG TABLET SR 12 HR Take 1 Each by mouth 3 times a day as needed (cough). 30 Tablet 0    ipratropium-albuterol (DUONEB) 0.5-2.5 (3) MG/3ML nebulizer solution Take 3 mL by nebulization every 6 hours as needed for Shortness of Breath (wheezing). 30 Each 0    albuterol 108 (90 Base) MCG/ACT Aero Soln inhalation aerosol Inhale 2 Puffs every four hours as needed for Shortness of Breath. 1 Each 1    fluticasone (FLONASE)  50 MCG/ACT nasal spray Administer 1 Spray into affected nostril(S) every day.      cetirizine (ZYRTEC) 10 MG Tab Take 10 mg by mouth every day.      clopidogrel (PLAVIX) 75 MG Tab       metoprolol SR (TOPROL XL) 50 MG TABLET SR 24 HR Take 50 mg by mouth every day.      atorvastatin (LIPITOR) 80 MG tablet Take 80 mg by mouth every evening.          Current Outpatient Medications on File Prior to Visit   Medication Sig Dispense Refill    traMADol (ULTRAM) 50 MG Tab Take 1 Tablet by mouth every 6 hours as needed for Moderate Pain for up to 30 days. 120 Tablet 0    lansoprazole (PREVACID) 30 MG CAPSULE DELAYED RELEASE TAKE 1 CAPSULE BY MOUTH EVERY DAY 90 Capsule 2    Dextromethorphan-guaiFENesin (MUCINEX DM)  MG TABLET SR 12 HR Take 1 Each by mouth 3 times a day as needed (cough). 30 Tablet 0    ipratropium-albuterol (DUONEB) 0.5-2.5 (3) MG/3ML nebulizer solution Take 3 mL by nebulization every 6 hours as needed for Shortness of Breath (wheezing). 30 Each 0    albuterol 108 (90 Base) MCG/ACT Aero Soln inhalation aerosol Inhale 2 Puffs every four hours as needed for Shortness of Breath. 1 Each 1    fluticasone (FLONASE) 50 MCG/ACT nasal spray Administer 1 Spray into affected nostril(S) every day.      cetirizine (ZYRTEC) 10 MG Tab Take 10 mg by mouth every day.      clopidogrel (PLAVIX) 75 MG Tab       metoprolol SR (TOPROL XL) 50 MG TABLET SR 24 HR Take 50 mg by mouth every day.      atorvastatin (LIPITOR) 80 MG tablet Take 80 mg by mouth every evening.       No current facility-administered medications on file prior to visit.         Allergies:   No Known Allergies    Social Hx:   Social History     Socioeconomic History    Marital status:      Spouse name: Not on file    Number of children: Not on file    Years of education: Not on file    Highest education level: Not on file   Occupational History    Not on file   Tobacco Use    Smoking status: Never    Smokeless tobacco: Never   Vaping Use    Vaping  status: Never Used   Substance and Sexual Activity    Alcohol use: Not Currently    Drug use: Not Currently    Sexual activity: Not Currently   Other Topics Concern    Not on file   Social History Narrative    Not on file     Social Drivers of Health     Financial Resource Strain: Not on file   Food Insecurity: Not on file   Transportation Needs: Not on file   Physical Activity: Not on file   Stress: Not on file   Social Connections: Not on file   Intimate Partner Violence: Not on file   Housing Stability: Not on file         EXAMINATION     Physical Exam:   Vitals: Ht 1.524 m (5')   Wt 65.3 kg (144 lb)     Constitutional:   Body Habitus: Body mass index is 28.12 kg/m².  Cooperation: Fully cooperates with exam  Appearance: Well-groomed no disheveled    Respiratory-  breathing comfortable on room air, no audible wheezing  Cardiovascular- capillary refills less than 2 seconds. No lower extremity edema is noted.   Psychiatric- alert and oriented ×3. Normal affect.    MSK and Neuro: -      Physical Exam          BILATERAL  Knee:   Inspection suprapatellar effusions are present.  No other swelling, rashes   Palpation positive mild tenderness palpation of the medial and lateral joint line.  No significant tenderness to palpation throughout the knee including the  quadriceps tendon, patellar tendon, patellar, medial patellar facets, lateral patellar facets, tibial tuberosity, fibular head.  Range of motion normal range of motion in flexion and extension  Special tests:   Varus stress tests: Negative  Valgus stress tests: Negative  Lockman's test: Negative  Anterior drawer: Negative  Posterior drawer: Negative  Elvis's: negative             MEDICAL DECISION MAKING    DATA    Labs:   Lab Results   Component Value Date/Time    SODIUM 139 01/23/2024 02:14 AM    POTASSIUM 3.9 01/23/2024 02:14 AM    CHLORIDE 104 01/23/2024 02:14 AM    CO2 22 01/23/2024 02:14 AM    GLUCOSE 97 01/23/2024 02:14 AM    BUN 23 (H) 01/23/2024 02:14  "AM    CREATININE 0.62 01/23/2024 02:14 AM        No results found for: \"PROTHROMBTM\", \"INR\"     Lab Results   Component Value Date/Time    WBC 11.2 (H) 01/23/2024 02:14 AM    RBC 4.24 01/23/2024 02:14 AM    HEMOGLOBIN 13.3 01/23/2024 02:14 AM    HEMATOCRIT 40.4 01/23/2024 02:14 AM    MCV 95.3 01/23/2024 02:14 AM    MCH 31.4 01/23/2024 02:14 AM    MCHC 32.9 01/23/2024 02:14 AM    MPV 9.4 01/23/2024 02:14 AM    NEUTSPOLYS 60.00 01/22/2024 07:15 PM    LYMPHOCYTES 27.60 01/22/2024 07:15 PM    MONOCYTES 9.70 01/22/2024 07:15 PM    EOSINOPHILS 1.10 01/22/2024 07:15 PM    BASOPHILS 0.50 01/22/2024 07:15 PM        No results found for: \"HBA1C\"       Imaging:   I personally reviewed following images      Results  Imaging  X-ray from October 4, 2023 shows degenerative changes with arthritis in the bilateral knees.     10/17/2024 I performed a Limited diagnostic ultrasound of the bilateral knees shows enthesopathy of the connection of the quadriceps tendon to the patella on the right knee, a small suprapatellar effusion on the right knee, and a moderate to large size suprapatellar effusion in the left knee.         I reviewed the following radiology reports          Results for orders placed during the hospital encounter of 03/08/24    MR-LUMBAR SPINE-W/O    Impression  1.  Degenerative disease as described above.  2.  There is severe right L4 foraminal stenosis with nerve root impingement.  3.  Infrarenal abdominal aortic aneurysm.                                Results for orders placed in visit on 01/02/24    DX-CHEST-2 VIEWS    Impression  NEGATIVE TWO VIEWS OF THE CHEST.                     Results for orders placed in visit on 10/04/23    DX-KNEE COMPLETE 4+ BILATERAL    Results for orders placed during the hospital encounter of 03/08/24    DX-LUMBAR SPINE-4+ VIEWS    Impression  1.  Multilevel disc and facet joint degenerative changes.  2.  Osteopenia without evidence displaced fracture.  3.  No evidence of dynamic " instability.  4.  3.6 cm infrarenal abdominal aortic aneurysm.                        DIAGNOSIS   Visit Diagnoses     ICD-10-CM   1. Chronic pain of both knees  M25.561    M25.562    G89.29   2. Arthritis of knee  M17.10   3. Lumbar radiculopathy  M54.16   4. Lumbar spondylosis  M47.816   5. Chronic bilateral low back pain without sciatica  M54.50    G89.29   6. History of MI (myocardial infarction)  I25.2   7. S/P coronary artery stent placement  Z95.5   8. Antiplatelet or antithrombotic long-term use  Z79.02             ASSESSMENT and PLAN:     Chantelle Sánchez  10/31/1927 female      Chantelle was seen today for follow-up.    Diagnoses and all orders for this visit:    Chronic pain of both knees  -     Referral to Physical Medicine Rehab  -     Referral to Physical Medicine Rehab    Arthritis of knee  -     Referral to Physical Medicine Rehab  -     Referral to Physical Medicine Rehab    Lumbar radiculopathy    Lumbar spondylosis    Chronic bilateral low back pain without sciatica    History of MI (myocardial infarction)    S/P coronary artery stent placement    Antiplatelet or antithrombotic long-term use          Assessment & Plan    The patient had positive genicular nerve blocks with 100% pain relief during the diagnostic phase after the diagnostic phase the patient's pain returned back to baseline.  We discussed options for her and decided that we would proceed with right and left genicular nerve neurotomy.  The left side is more painful than the right we will proceed with a left side first.    The risks benefits and alternatives to this procedure were discussed and the patient wishes to proceed with the procedure. Risks include but are not limited to damage to surrounding structures, infection, bleeding, worsening of pain which can be permanent, weakness which can be permanent. Benefits include pain relief, improved function. Alternatives includes not doing the procedure.      Medications: The patient  will need clearance to be off of Plavix for 5 days prior to the procedures above.    Lumbar radiculopathy and lumbar spondylosis are stable    I also discussed the case with the patient's daughter Gabriella who was at this visit and assisted with the HPI.          Follow up: After the above procedures    Thank you for allowing me to participate in the care of this patient. If you have any questions please not hesitate to contact me.             Please note that this dictation was created using voice recognition software. I have made every reasonable attempt to correct obvious errors but there may be errors of grammar and content that I may have overlooked prior to finalization of this note.      Enrique Louise MD  Interventional Spine and Sports Physiatry  Physical Medicine and Rehabilitation  [facility]

## 2025-01-21 NOTE — PROGRESS NOTES
Follow up patient note  Interventional spine and Pain  Physiatry (physical medicine and  Rehabilitation)       Chief complaint:   Chief Complaint   Patient presents with    Follow-Up     Knee pain          HISTORY    Please see new patient note by Dr Louise,  for more details.     HPI  Patient identification: Chantelle Sánchez ,  10/31/1927,   With Diagnoses of Chronic pain of both knees, Arthritis of knee, Lumbar radiculopathy, Lumbar spondylosis, Chronic bilateral low back pain without sciatica, History of MI (myocardial infarction), S/P coronary artery stent placement, and Antiplatelet or antithrombotic long-term use were pertinent to this visit.     Procedures   lumbar L5-S1 interlaminar epidural steroid injection which I performed on 2024 with 90% pain relief overall.  Preprocedure pain 7/10 NRS postprocedure pain 1/10 NRS.    2025 bilateral knee genicular nerve blocks preprocedure pain 8 out of 10 on numeric rating scale postprocedure pain 0 out of 10 on numeric rating scale.  100% pain relief during the diagnostic phase.  Pain returned as expected after the diagnostic phase.      This encounter was conducted via secure encrypted technology using Teams videoconferencing.   The patient was in a private location in the [facility]  POS 10 - telehealth provided in patient's home   Verbal consent was obtained. Patient's identity was verified.     History of Present Illness  From the previous visit   the patient is a 89-year-old female presenting for a follow-up visit, accompanied by her daughter, who assists in providing the history of present illness.    The patient reports chronic bilateral knee pain, described as aching in quality, with an intensity of 9 out of 10, constant in nature, and persisting for many years. She has not responded to conservative treatments, including a provider-directed home exercise program over the past six months. Due to her advanced age, she is not prescribed  NSAIDs or muscle relaxants. Previously, she received intra-articular corticosteroid injections every three months, but the most recent injection failed to provide relief. She underwent a left knee genicular nerve block on 11/16/2024. Post-procedure, she had a difficult time telling if there was pain relief in the left knee given pain in the right knee and difficulty with walking.  Despite this, the patient noted an improvement in her ability to rise from the table post-procedure. The daughter also inquired about the potential use of a TENS (Transcutaneous Electrical Nerve Stimulation) unit for pain management.    From this visit  The patient had excellent improvement with 100% pain relief of the bilateral knee pain during the genicular nerve blocks after the diagnostic phase the pain returned back to baseline as expected after the nerve block.  Her pain is currently 8 out of 10 intensity chronic constant present for many years overall tried conservative treatments including the past 6 months.  She is interested in nerve ablation.  She is not interested in replacement surgery given age.    MEDICATIONS  - Tramadol (current)    Conservative treatments including the past two months within the past six months  NSAIDs: no because of age  Acetaminophen: yes  Home exercise program or physical therapy: yes  Activity modification: yes    Patient is done a provider driven home exercise program in the past including the past 6 months.    Not on NSAIDs because of age.  Not on muscle relaxers because of age and she has tried acetaminophen.         ROS Red Flags :   Fever, Chills, Sweats: Denies  Involuntary Weight Loss: Denies  Bowel/Bladder Incontinence: Denies  Saddle Anesthesia: Denies        PMHx:   Past Medical History:   Diagnosis Date    History of heart artery stent     History of heart attack     Hyperlipidemia     Hypertension     Osteoarthritis     (B)       PSHx:   Past Surgical History:   Procedure Laterality Date     [place] INJ AA/STRD GNCLR NRV BRNCH W/IMG Bilateral 1/14/2025    Procedure: bilateral knee genicular nerve blocks with fluoroscopic guidance;  Surgeon: Enrique Louise M.D.;  [facility];  Service: Pain Management    IL INJ AA/STRD GNCLR NRV BRNCH W/IMG Left 11/6/2024    Procedure: LEFT knee genicular nerve blocks with fluoroscopic guidance….Note: OK to continue plavix. DEA guidelines reviewed. Left knee approximately 1 week before the right.;  Surgeon: Enrique Louise M.D.;  Location: [facility];  Service: Pain Management    IL INJ LUMBAR/SACRAL,W/ IMAGING N/A 4/23/2024    Procedure: Lumbar L5-S1 interlaminar epidural steroid injection…..Note: Patient needs clearance to be off of Plavix for 5 days prior to the procedure;  Surgeon: Enrique Louise M.D.;  Location: [facility];  Service: Pain Management    CARPAL TUNNEL RELEASE      CATARACT EXTRACTION WITH IOL      KNEE ARTHROSCOPY      OTHER ORTHOPEDIC SURGERY      toe    RHINOPLASTY         Family history   History reviewed. No pertinent family history.      Medications:   Outpatient Medications Marked as Taking for the 1/21/25 encounter (Telemedicine) with Enrique Louise M.D.   Medication Sig Dispense Refill    traMADol (ULTRAM) 50 MG Tab Take 1 Tablet by mouth every 6 hours as needed for Moderate Pain for up to 30 days. 120 Tablet 0    lansoprazole (PREVACID) 30 MG CAPSULE DELAYED RELEASE TAKE 1 CAPSULE BY MOUTH EVERY DAY 90 Capsule 2    Dextromethorphan-guaiFENesin (MUCINEX DM)  MG TABLET SR 12 HR Take 1 Each by mouth 3 times a day as needed (cough). 30 Tablet 0    ipratropium-albuterol (DUONEB) 0.5-2.5 (3) MG/3ML nebulizer solution Take 3 mL by nebulization every 6 hours as needed for Shortness of Breath (wheezing). 30 Each 0    albuterol 108 (90 Base) MCG/ACT Aero Soln inhalation aerosol Inhale 2 Puffs every four hours as needed for Shortness of Breath. 1 Each 1    fluticasone (FLONASE)  50 MCG/ACT nasal spray Administer 1 Spray into affected nostril(S) every day.      cetirizine (ZYRTEC) 10 MG Tab Take 10 mg by mouth every day.      clopidogrel (PLAVIX) 75 MG Tab       metoprolol SR (TOPROL XL) 50 MG TABLET SR 24 HR Take 50 mg by mouth every day.      atorvastatin (LIPITOR) 80 MG tablet Take 80 mg by mouth every evening.          Current Outpatient Medications on File Prior to Visit   Medication Sig Dispense Refill    traMADol (ULTRAM) 50 MG Tab Take 1 Tablet by mouth every 6 hours as needed for Moderate Pain for up to 30 days. 120 Tablet 0    lansoprazole (PREVACID) 30 MG CAPSULE DELAYED RELEASE TAKE 1 CAPSULE BY MOUTH EVERY DAY 90 Capsule 2    Dextromethorphan-guaiFENesin (MUCINEX DM)  MG TABLET SR 12 HR Take 1 Each by mouth 3 times a day as needed (cough). 30 Tablet 0    ipratropium-albuterol (DUONEB) 0.5-2.5 (3) MG/3ML nebulizer solution Take 3 mL by nebulization every 6 hours as needed for Shortness of Breath (wheezing). 30 Each 0    albuterol 108 (90 Base) MCG/ACT Aero Soln inhalation aerosol Inhale 2 Puffs every four hours as needed for Shortness of Breath. 1 Each 1    fluticasone (FLONASE) 50 MCG/ACT nasal spray Administer 1 Spray into affected nostril(S) every day.      cetirizine (ZYRTEC) 10 MG Tab Take 10 mg by mouth every day.      clopidogrel (PLAVIX) 75 MG Tab       metoprolol SR (TOPROL XL) 50 MG TABLET SR 24 HR Take 50 mg by mouth every day.      atorvastatin (LIPITOR) 80 MG tablet Take 80 mg by mouth every evening.       No current facility-administered medications on file prior to visit.         Allergies:   No Known Allergies    Social Hx:   Social History     Socioeconomic History    Marital status:      Spouse name: Not on file    Number of children: Not on file    Years of education: Not on file    Highest education level: Not on file   Occupational History    Not on file   Tobacco Use    Smoking status: Never    Smokeless tobacco: Never   Vaping Use    Vaping  status: Never Used   Substance and Sexual Activity    Alcohol use: Not Currently    Drug use: Not Currently    Sexual activity: Not Currently   Other Topics Concern    Not on file   Social History Narrative    Not on file     Social Drivers of Health     Financial Resource Strain: Not on file   Food Insecurity: Not on file   Transportation Needs: Not on file   Physical Activity: Not on file   Stress: Not on file   Social Connections: Not on file   Intimate Partner Violence: Not on file   Housing Stability: Not on file         EXAMINATION     Physical Exam:   Vitals: Ht 1.524 m (5')   Wt 65.3 kg (144 lb)     Constitutional:   Body Habitus: Body mass index is 28.12 kg/m².  Cooperation: Fully cooperates with exam  Appearance: Well-groomed no disheveled    Respiratory-  breathing comfortable on room air, no audible wheezing  Cardiovascular- capillary refills less than 2 seconds. No lower extremity edema is noted.   Psychiatric- alert and oriented ×3. Normal affect.    MSK and Neuro: -      Physical Exam          BILATERAL  Knee:   Inspection suprapatellar effusions are present.  No other swelling, rashes   Palpation positive mild tenderness palpation of the medial and lateral joint line.  No significant tenderness to palpation throughout the knee including the  quadriceps tendon, patellar tendon, patellar, medial patellar facets, lateral patellar facets, tibial tuberosity, fibular head.  Range of motion normal range of motion in flexion and extension  Special tests:   Varus stress tests: Negative  Valgus stress tests: Negative  Lockman's test: Negative  Anterior drawer: Negative  Posterior drawer: Negative  Elvis's: negative             MEDICAL DECISION MAKING    DATA    Labs:   Lab Results   Component Value Date/Time    SODIUM 139 01/23/2024 02:14 AM    POTASSIUM 3.9 01/23/2024 02:14 AM    CHLORIDE 104 01/23/2024 02:14 AM    CO2 22 01/23/2024 02:14 AM    GLUCOSE 97 01/23/2024 02:14 AM    BUN 23 (H) 01/23/2024 02:14  "AM    CREATININE 0.62 01/23/2024 02:14 AM        No results found for: \"PROTHROMBTM\", \"INR\"     Lab Results   Component Value Date/Time    WBC 11.2 (H) 01/23/2024 02:14 AM    RBC 4.24 01/23/2024 02:14 AM    HEMOGLOBIN 13.3 01/23/2024 02:14 AM    HEMATOCRIT 40.4 01/23/2024 02:14 AM    MCV 95.3 01/23/2024 02:14 AM    MCH 31.4 01/23/2024 02:14 AM    MCHC 32.9 01/23/2024 02:14 AM    MPV 9.4 01/23/2024 02:14 AM    NEUTSPOLYS 60.00 01/22/2024 07:15 PM    LYMPHOCYTES 27.60 01/22/2024 07:15 PM    MONOCYTES 9.70 01/22/2024 07:15 PM    EOSINOPHILS 1.10 01/22/2024 07:15 PM    BASOPHILS 0.50 01/22/2024 07:15 PM        No results found for: \"HBA1C\"       Imaging:   I personally reviewed following images      Results  Imaging  X-ray from October 4, 2023 shows degenerative changes with arthritis in the bilateral knees.     10/17/2024 I performed a Limited diagnostic ultrasound of the bilateral knees shows enthesopathy of the connection of the quadriceps tendon to the patella on the right knee, a small suprapatellar effusion on the right knee, and a moderate to large size suprapatellar effusion in the left knee.         I reviewed the following radiology reports          Results for orders placed during the hospital encounter of 03/08/24    MR-LUMBAR SPINE-W/O    Impression  1.  Degenerative disease as described above.  2.  There is severe right L4 foraminal stenosis with nerve root impingement.  3.  Infrarenal abdominal aortic aneurysm.                                Results for orders placed in visit on 01/02/24    DX-CHEST-2 VIEWS    Impression  NEGATIVE TWO VIEWS OF THE CHEST.                     Results for orders placed in visit on 10/04/23    DX-KNEE COMPLETE 4+ BILATERAL    Results for orders placed during the hospital encounter of 03/08/24    DX-LUMBAR SPINE-4+ VIEWS    Impression  1.  Multilevel disc and facet joint degenerative changes.  2.  Osteopenia without evidence displaced fracture.  3.  No evidence of dynamic " instability.  4.  3.6 cm infrarenal abdominal aortic aneurysm.                        DIAGNOSIS   Visit Diagnoses     ICD-10-CM   1. Chronic pain of both knees  M25.561    M25.562    G89.29   2. Arthritis of knee  M17.10   3. Lumbar radiculopathy  M54.16   4. Lumbar spondylosis  M47.816   5. Chronic bilateral low back pain without sciatica  M54.50    G89.29   6. History of MI (myocardial infarction)  I25.2   7. S/P coronary artery stent placement  Z95.5   8. Antiplatelet or antithrombotic long-term use  Z79.02             ASSESSMENT and PLAN:     Chantelle Sánchez  10/31/1927 female      Chantelle was seen today for follow-up.    Diagnoses and all orders for this visit:    Chronic pain of both knees  -     Referral to Physical Medicine Rehab  -     Referral to Physical Medicine Rehab    Arthritis of knee  -     Referral to Physical Medicine Rehab  -     Referral to Physical Medicine Rehab    Lumbar radiculopathy    Lumbar spondylosis    Chronic bilateral low back pain without sciatica    History of MI (myocardial infarction)    S/P coronary artery stent placement    Antiplatelet or antithrombotic long-term use          Assessment & Plan    The patient had positive genicular nerve blocks with 100% pain relief during the diagnostic phase after the diagnostic phase the patient's pain returned back to baseline.  We discussed options for her and decided that we would proceed with right and left genicular nerve neurotomy.  The left side is more painful than the right we will proceed with a left side first.    The risks benefits and alternatives to this procedure were discussed and the patient wishes to proceed with the procedure. Risks include but are not limited to damage to surrounding structures, infection, bleeding, worsening of pain which can be permanent, weakness which can be permanent. Benefits include pain relief, improved function. Alternatives includes not doing the procedure.      Medications: The patient  will need clearance to be off of Plavix for 5 days prior to the procedures above.    Lumbar radiculopathy and lumbar spondylosis are stable    I also discussed the case with the patient's daughter Gabriella who was at this visit and assisted with the HPI.          Follow up: After the above procedures    Thank you for allowing me to participate in the care of this patient. If you have any questions please not hesitate to contact me.             Please note that this dictation was created using voice recognition software. I have made every reasonable attempt to correct obvious errors but there may be errors of grammar and content that I may have overlooked prior to finalization of this note.      Enrique Louise MD  Interventional Spine and Sports Physiatry  Physical Medicine and Rehabilitation  [facility]

## 2025-01-22 ENCOUNTER — TELEPHONE (OUTPATIENT)
Dept: CARDIOLOGY | Facility: MEDICAL CENTER | Age: OVER 89
End: 2025-01-22
Payer: MEDICARE

## 2025-01-23 NOTE — TELEPHONE ENCOUNTER
Last OV: 10/16/2024  Proposed Surgery: LEFT GENICULAR NERVE NEUROTOMY WITH SEDATION   Surgery Date:TBD   Requesting Office Name: [facility]   Fax Number: [phone]  Preference of Location (default is surgery center unless specified by Cardiologist or DAVID)  Prior Clearance Addressed: No    Is this a general clearance? YES   Anticoags/Antiplatelets: Other NONE  Anticoags/Antiplatelet managed by Cardiology? YES    Outstanding Cardiac Imaging : Yes  Echo.   Clearance to provider to review  Ablation, Cardioversion, Stent, Cardiac Devices, Catheterization, Watchman: No  TAVR/Valve, Mitral Clip, Watchman (including open heart),: N/A   Recent Cardiac Hospitalization: No            When: N/A  History (cardiac history):   Past Medical History:   Diagnosis Date    History of heart artery stent     History of heart attack     Hyperlipidemia     Hypertension     Osteoarthritis     (B)           Is this a dental clearance? NO  Ablation, Cardioversion, Watchman, Stents, Cath, Devices within the last 3 months? No   If yes- Send dental wait letter, do not forward to provider for review.     TAVR / Valve, Mitral clip within the last 6 months? No  If yes- Send dental wait letter, do not forward to provider for review.     If completing a general clearance, continue per protocol.           Surgical Clearance Letter Sent: No Provider to advise.   **Scan clearance request letter into [employer].**

## 2025-01-29 ENCOUNTER — APPOINTMENT (OUTPATIENT)
Dept: MEDICAL GROUP | Facility: MEDICAL CENTER | Age: OVER 89
End: 2025-01-29
Payer: MEDICARE

## 2025-02-04 ENCOUNTER — HOSPITAL ENCOUNTER (OUTPATIENT)
Facility: REHABILITATION | Age: OVER 89
End: 2025-02-04
Attending: PHYSICAL MEDICINE & REHABILITATION | Admitting: PHYSICAL MEDICINE & REHABILITATION
Payer: MEDICARE

## 2025-02-05 ENCOUNTER — HOSPITAL ENCOUNTER (OUTPATIENT)
Facility: REHABILITATION | Age: OVER 89
End: 2025-02-05
Attending: PHYSICAL MEDICINE & REHABILITATION | Admitting: PHYSICAL MEDICINE & REHABILITATION
Payer: MEDICARE

## 2025-02-05 ENCOUNTER — APPOINTMENT (OUTPATIENT)
Dept: RADIOLOGY | Facility: REHABILITATION | Age: OVER 89
End: 2025-02-05
Attending: PHYSICAL MEDICINE & REHABILITATION
Payer: MEDICARE

## 2025-02-05 VITALS
OXYGEN SATURATION: 94 % | DIASTOLIC BLOOD PRESSURE: 79 MMHG | BODY MASS INDEX: 28.31 KG/M2 | WEIGHT: 144.18 LBS | TEMPERATURE: 97.3 F | HEIGHT: 60 IN | RESPIRATION RATE: 19 BRPM | HEART RATE: 72 BPM | SYSTOLIC BLOOD PRESSURE: 166 MMHG

## 2025-02-05 PROCEDURE — 700111 HCHG RX REV CODE 636 W/ 250 OVERRIDE (IP): Mod: JZ

## 2025-02-05 PROCEDURE — 64624 DSTRJ NULYT AGT GNCLR NRV: CPT

## 2025-02-05 PROCEDURE — 99152 MOD SED SAME PHYS/QHP 5/>YRS: CPT

## 2025-02-05 RX ORDER — LIDOCAINE HYDROCHLORIDE 10 MG/ML
INJECTION, SOLUTION EPIDURAL; INFILTRATION; INTRACAUDAL; PERINEURAL
Status: COMPLETED
Start: 2025-02-05 | End: 2025-02-05

## 2025-02-05 RX ORDER — ROPIVACAINE HYDROCHLORIDE 5 MG/ML
INJECTION, SOLUTION EPIDURAL; INFILTRATION; PERINEURAL
Status: COMPLETED
Start: 2025-02-05 | End: 2025-02-05

## 2025-02-05 RX ADMIN — FENTANYL CITRATE 25 MCG: 50 INJECTION, SOLUTION INTRAMUSCULAR; INTRAVENOUS at 08:20

## 2025-02-05 RX ADMIN — LIDOCAINE HYDROCHLORIDE 30 ML: 10 INJECTION, SOLUTION EPIDURAL; INFILTRATION; INTRACAUDAL; PERINEURAL at 08:26

## 2025-02-05 RX ADMIN — ROPIVACAINE HYDROCHLORIDE 20 ML: 5 INJECTION, SOLUTION EPIDURAL; INFILTRATION; PERINEURAL at 08:27

## 2025-02-05 ASSESSMENT — PAIN DESCRIPTION - PAIN TYPE
TYPE: CHRONIC PAIN
TYPE: CHRONIC PAIN

## 2025-02-05 ASSESSMENT — FIBROSIS 4 INDEX: FIB4 SCORE: 1.72

## 2025-02-05 NOTE — OP REPORT
Date of Service: 2/5/2025    Physician/s: Enrique Louise MD    Pre-operative Diagnosis: Knee osteoarthrosis, Knee pain,     Post-operative Diagnosis: Knee osteoarthrosis, Knee pain,     Procedure: LEFT Knee Genicular Nerves radiofrequency neurotomy    Description of procedure:  The risks, benefits, and alternatives of the procedure were reviewed and discussed with the patient.  Written informed consent was freely obtained. A pre-procedural time-out was conducted by the physician verifying patient’s identity, procedure to be performed, procedure site and side, and allergy verification. Appropriate equipment was determined to be in place for the procedure.     Moderation sedation was achieved with  Fentanyl (25mcg). Monitoring of the patients vital signs and respiratory status was provided by trained independent registered nurse during the entire course of the procedures and under my supervision and recoded in the patient’s medical record. The duration of sedation was over 10 minutes.     In the fluoroscopy suite the patient was placed in a supine position, the knee was flexed with a pillow/towels underneath for support, and the skin was prepped and draped in the usual sterile fashion. The fluoroscope was placed over the LEFT knee at an true AP position, and superolateral genicular nerve,  superomedial genicular nerve, inferomedial genicular nerve targets for injection were marked. A 27g needle was placed into each of the markings at the genicular nerve targets, and approx 2mL of 1% Lidocaine was injected subcutaneously into the epidermal and dermal layers. The needle was removed. A 18g RF needle was then advanced into the superior lateral, superior medial, and superior midline target points for the genicular nerves. The needle tips were then verified by AP and lateral views.  Following negative aspiration, 1mL of 1% Lidocaine and 1 mL of 0.5% ropivacaine   was then injected at the above levels. The RF probes were  placed into each of the needles, motor stimulation was done which was negative, and the RF probe was applied to each of the nerves for 1 minutes and 30 seconds at 80 degrees celsius. The needles were removed intact after restyleted.      A 18g RF needle was then advanced to the second target for the same genicular nerves. The needle tips were then verified by AP and lateral views. In the AP view. The RF probes were placed into each of the needles, motor stimulation was done which was negative, and the RF probe was applied to each of the nerves for 1 minutes and 30 seconds at 80 degrees celsius. The needles were removed intact after restyleted.     The patient had 50 percent pain relief postprocedure  Preprocedure pain 8/10 NRS  Postprocedure pain 4 /10 NRS     There were no complications noted, was hemodynamically stable, and tolerated the procedure well.     Enrique Louise MD  Interventional Spine and Pain  Physical Medicine and Rehabilitation  [facility]          CPT  Genicular nerve RFA:  [id number]  Superomedial genicular nerve    Superolateral genicular nerve    Inferomedial genicular nerve   moderate procedural sedation first 15 minutes: [id number]

## 2025-02-05 NOTE — INTERVAL H&P NOTE
Consented Procedure: LEFT genicular nerve neurotomy with sedation….Note: Patient needs clearance to be off of Plavix for 5 days prior to the procedure.  Plan on the left knee approximately 2 weeks before the right knee.  Plan on sedation with 25mcg fentanyl.  I have examined the patient, provided the risks, benefits, and alternatives to the procedure(s) indicated on the signed consent form, and the patient wishes to proceed.    H&P reviewed. The patient was examined and there are no changes to the H&P      Enrique Louise M.D.  02/05/25 8:12 AM

## 2025-02-05 NOTE — OR SURGEON
Immediate Post OP Note    Pre-Op Diagnosis Codes:      * Chronic pain of both knees [M25.561, M25.562, G89.29]     * Arthritis of knee [M17.10]      Post-Op Diagnosis Codes:     * Chronic pain of both knees [M25.561, M25.562, G89.29]     * Arthritis of knee [M17.10]      Procedure(s):  LEFT genicular nerve neurotomy with sedation    sedation with 25mcg fentanyl. - Wound Class: Clean    Surgeon(s):  Enrique Louise M.D.    Anesthesiologist/Type of Anesthesia:  No anesthesia staff entered./Local    Surgical Staff:  Circulator: Taty Mcdonnell R.N.  Scrub Person: Chichi Osman; Amara Odonnell R.N.  Radiology Technologist: Reanna Whitney    Specimens removed if any:  * No specimens in log *    Estimated Blood Loss: None    Findings: None    Complications: None        2/5/2025 8:54 AM Enrique Louise M.D.

## 2025-02-06 ENCOUNTER — TELEPHONE (OUTPATIENT)
Dept: PHYSICAL MEDICINE AND REHAB | Facility: MEDICAL CENTER | Age: OVER 89
End: 2025-02-06
Payer: MEDICARE

## 2025-02-06 ENCOUNTER — PATIENT MESSAGE (OUTPATIENT)
Dept: MEDICAL GROUP | Facility: MEDICAL CENTER | Age: OVER 89
End: 2025-02-06
Payer: MEDICARE

## 2025-02-06 NOTE — TELEPHONE ENCOUNTER
Called for post-sp check-up. Pt reported the following regarding the procedure site: LEFT genicular nerve neurotomy with sedation     Change in pain?: Yes    Concerns?: No    Confirmed [facility] appt?: Yes

## 2025-02-18 ENCOUNTER — OFFICE VISIT (OUTPATIENT)
Dept: MEDICAL GROUP | Age: OVER 89
End: 2025-02-18
Payer: MEDICARE

## 2025-02-18 ENCOUNTER — HOSPITAL ENCOUNTER (OUTPATIENT)
Dept: LAB | Facility: MEDICAL CENTER | Age: OVER 89
End: 2025-02-18
Attending: PHYSICIAN ASSISTANT
Payer: MEDICARE

## 2025-02-18 VITALS
OXYGEN SATURATION: 94 % | WEIGHT: 145 LBS | BODY MASS INDEX: 28.47 KG/M2 | DIASTOLIC BLOOD PRESSURE: 64 MMHG | HEIGHT: 60 IN | TEMPERATURE: 97.6 F | SYSTOLIC BLOOD PRESSURE: 118 MMHG | HEART RATE: 74 BPM

## 2025-02-18 DIAGNOSIS — R60.0 LOWER EXTREMITY EDEMA: ICD-10-CM

## 2025-02-18 DIAGNOSIS — I35.1 NONRHEUMATIC AORTIC VALVE INSUFFICIENCY: ICD-10-CM

## 2025-02-18 PROCEDURE — 99214 OFFICE O/P EST MOD 30 MIN: CPT | Performed by: PHYSICIAN ASSISTANT

## 2025-02-18 PROCEDURE — 3078F DIAST BP <80 MM HG: CPT | Performed by: PHYSICIAN ASSISTANT

## 2025-02-18 PROCEDURE — 83880 ASSAY OF NATRIURETIC PEPTIDE: CPT | Mod: GA

## 2025-02-18 PROCEDURE — 36415 COLL VENOUS BLD VENIPUNCTURE: CPT

## 2025-02-18 PROCEDURE — 3074F SYST BP LT 130 MM HG: CPT | Performed by: PHYSICIAN ASSISTANT

## 2025-02-18 PROCEDURE — 80048 BASIC METABOLIC PNL TOTAL CA: CPT

## 2025-02-18 PROCEDURE — G2211 COMPLEX E/M VISIT ADD ON: HCPCS | Performed by: PHYSICIAN ASSISTANT

## 2025-02-18 RX ORDER — POTASSIUM CHLORIDE 750 MG/1
10 TABLET, EXTENDED RELEASE ORAL 2 TIMES DAILY
Qty: 60 TABLET | Refills: 0 | Status: SHIPPED | OUTPATIENT
Start: 2025-02-18

## 2025-02-18 RX ORDER — FUROSEMIDE 20 MG/1
20 TABLET ORAL 2 TIMES DAILY
Qty: 60 TABLET | Refills: 0 | Status: SHIPPED | OUTPATIENT
Start: 2025-02-18 | End: 2025-02-19

## 2025-02-18 ASSESSMENT — FIBROSIS 4 INDEX: FIB4 SCORE: 1.72

## 2025-02-18 NOTE — PROGRESS NOTES
cc: Lower extremity edema    Subjective:     HPI    History of Present Illness  The patient is a 89-year-old female who is unable to see her PCP, Dr. López, today and presents with concerns of leg swelling. She is accompanied by her daughter.    She has been experiencing leg swelling for approximately one week, which has been causing significant discomfort. The swelling has been severe enough to prevent her from wearing slippers. Her legs were noted to be warm on Sunday, but they are currently cold. She reports no shortness of breath or chest pain. She has been spending more time seated with her feet elevated since knee ablation procedure 2 weeks ago. She has been under the care of Dr. 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 Despite attempts at various treatments, including an ablation procedure, her symptoms have not improved. Post-ablation, she has been experiencing radiating pain down her leg and foot swelling. She does not experience any calf pain but reports that the pain intensifies during ambulation. She also reports difficulty in rising from a seated position. Her knee pain has remained consistent or slightly worsened since the procedure. She has been managing her pain with tramadol but is nearing the end of her supply.  She was scheduled for another knee ablation tomorrow but has decided against it due to the persistent pain following the first procedure. She has been off Plavix for 7 days before and after the procedure, which was performed 2 weeks ago. She resumed Plavix on the subsequent Wednesday. She is currently under the care of a cardiologist and has not had an echocardiogram since her hospital stay January 2024 with results as below.  Her cardiologist does have echo ordered.      ECHO CONCLUSIONS 1/22/2024  Normal right and left ventricular size and function.   The ejection fraction is measured to be 72% by Archibald's biplane.  Grade I diastolic dysfunction.  Aortic  valve sclerosis without significant stenosis.  Moderate aortic insufficiency.  Calcified aortic valve leaflets with what appears to be some mobile   components, consider endocarditis if clinically indicated.  Moderate tricuspid regurgitation.  Normal right atrial pressure.   Mild pulmonary hypertension, PASP of 39mmHg.     Review of systems:  See above.       Current Outpatient Medications:     furosemide (LASIX) 20 MG Tab, Take 1 Tablet by mouth 2 times a day., Disp: 60 Tablet, Rfl: 0    potassium chloride ER (KLOR-CON) 10 MEQ tablet, Take 1 Tablet by mouth 2 times a day., Disp: 60 Tablet, Rfl: 0    traMADol (ULTRAM) 50 MG Tab, Take 1 Tablet by mouth every 6 hours as needed for Moderate Pain for up to 30 days., Disp: 120 Tablet, Rfl: 0    lansoprazole (PREVACID) 30 MG CAPSULE DELAYED RELEASE, TAKE 1 CAPSULE BY MOUTH EVERY DAY, Disp: 90 Capsule, Rfl: 2    Dextromethorphan-guaiFENesin (MUCINEX DM)  MG TABLET SR 12 HR, Take 1 Each by mouth 3 times a day as needed (cough)., Disp: 30 Tablet, Rfl: 0    ipratropium-albuterol (DUONEB) 0.5-2.5 (3) MG/3ML nebulizer solution, Take 3 mL by nebulization every 6 hours as needed for Shortness of Breath (wheezing)., Disp: 30 Each, Rfl: 0    cetirizine (ZYRTEC) 10 MG Tab, Take 10 mg by mouth every day., Disp: , Rfl:     clopidogrel (PLAVIX) 75 MG Tab, , Disp: , Rfl:     metoprolol SR (TOPROL XL) 50 MG TABLET SR 24 HR, Take 50 mg by mouth every day., Disp: , Rfl:     atorvastatin (LIPITOR) 80 MG tablet, Take 80 mg by mouth every evening., Disp: , Rfl:     albuterol 108 (90 Base) MCG/ACT Aero Soln inhalation aerosol, Inhale 2 Puffs every four hours as needed for Shortness of Breath. (Patient not taking: Reported on 2/18/2025), Disp: 1 Each, Rfl: 1    fluticasone (FLONASE) 50 MCG/ACT nasal spray, Administer 1 Spray into affected nostril(S) every day. (Patient not taking: Reported on 2/18/2025), Disp: , Rfl:     Allergies, past medical history, past surgical history, family  history, social history reviewed and updated    Objective:     Vitals: /64 (BP Location: Left arm, Patient Position: Sitting, BP Cuff Size: Adult)   Pulse 74   Temp 36.4 °C (97.6 °F) (Temporal)   Ht 1.524 m (5')   Wt 65.8 kg (145 lb)   SpO2 94%   BMI 28.32 kg/m²   General: Alert, pleasant, NAD  HEENT: Normocephalic. Neck supple.  No thyromegaly or masses palpated. No cervical or supraclavicular lymphadenopathy. No carotid bruits   Heart: Regular rate and rhythm.  S1 and S2 normal.  No murmurs appreciated.  Respiratory: Normal respiratory effort.  Clear to auscultation bilaterally.  Skin: Warm, dry, no rashes.  Extremities: 3+ pitting edema to the shins bilaterally.  No redness.  No warmth.  Negative Homans' sign.  No palpable cords.  Pedal pulses 2+ symmetric  Psych:  Affect/mood is normal, judgement is good, memory is intact, grooming is appropriate.    Assessment/Plan:     Chantelle was seen today for leg swelling.    Diagnoses and all orders for this visit:    Lower extremity edema  -New onset since ablation procedure.  It is bilateral.  No redness or warmth noted on exam.  Concerns for cellulitis are very low and do not need antibiotics at this point in time.  She has been more sedentary, had been off of her Plavix.  Cannot completely exclude DVT although suspicion is low.  Will obtain stat venous ultrasound to rule out.  Do believe that this is more dependent edema in conjunction to potentially worsening of heart failure.  Will check BMP.  Starting on Lasix in addition to potassium.  Advised to start Lasix once daily if no improvement after 2 days increase Lasix to twice daily in conjunction with potassium.  Will get baseline GFR and potassium levels as well.  Repeat labs in 1 week.  If any point this worsens starts to have redness or warmth needs to go to the ER immediately.  -     proBrain Natriuretic Peptide, NT; Future  -     Basic Metabolic Panel; Standing  -     US-EXTREMITY VENOUS LOWER BILAT;  Future  -     furosemide (LASIX) 20 MG Tab; Take 1 Tablet by mouth 2 times a day.  -     potassium chloride ER (KLOR-CON) 10 MEQ tablet; Take 1 Tablet by mouth 2 times a day.    Nonrheumatic aortic valve insufficiency  Currently followed by cardiology.  Has echo ordered.  Advised to call and schedule echo    Secondary to the complexity of this patient's illnesses and their interactions.  All problems listed were discussed during the office visit, medications were evaluated and complexities were discussed as well as plan for the future.      Return in about 1 week (around 2/25/2025) for Lower extremity edema.

## 2025-02-19 ENCOUNTER — HOSPITAL ENCOUNTER (OUTPATIENT)
Dept: RADIOLOGY | Facility: MEDICAL CENTER | Age: OVER 89
End: 2025-02-19
Attending: PHYSICIAN ASSISTANT
Payer: MEDICARE

## 2025-02-19 DIAGNOSIS — R60.0 LOWER EXTREMITY EDEMA: ICD-10-CM

## 2025-02-19 LAB
ANION GAP SERPL CALC-SCNC: 10 MMOL/L (ref 7–16)
BUN SERPL-MCNC: 30 MG/DL (ref 8–22)
CALCIUM SERPL-MCNC: 9.4 MG/DL (ref 8.5–10.5)
CHLORIDE SERPL-SCNC: 104 MMOL/L (ref 96–112)
CO2 SERPL-SCNC: 25 MMOL/L (ref 20–33)
CREAT SERPL-MCNC: 0.76 MG/DL (ref 0.5–1.4)
GFR SERPLBLD CREATININE-BSD FMLA CKD-EPI: 71 ML/MIN/1.73 M 2
GLUCOSE SERPL-MCNC: 103 MG/DL (ref 65–99)
NT-PROBNP SERPL IA-MCNC: 560 PG/ML (ref 0–125)
POTASSIUM SERPL-SCNC: 4.4 MMOL/L (ref 3.6–5.5)
SODIUM SERPL-SCNC: 139 MMOL/L (ref 135–145)

## 2025-02-19 PROCEDURE — 93970 EXTREMITY STUDY: CPT

## 2025-02-19 RX ORDER — FUROSEMIDE 20 MG/1
20 TABLET ORAL 2 TIMES DAILY
Qty: 180 TABLET | Refills: 0 | Status: SHIPPED | OUTPATIENT
Start: 2025-02-19

## 2025-02-20 ENCOUNTER — RESULTS FOLLOW-UP (OUTPATIENT)
Dept: MEDICAL GROUP | Age: OVER 89
End: 2025-02-20

## 2025-02-20 ENCOUNTER — APPOINTMENT (OUTPATIENT)
Dept: MEDICAL GROUP | Facility: MEDICAL CENTER | Age: OVER 89
End: 2025-02-20
Payer: MEDICARE

## 2025-02-25 ENCOUNTER — OFFICE VISIT (OUTPATIENT)
Dept: MEDICAL GROUP | Age: OVER 89
End: 2025-02-25
Payer: MEDICARE

## 2025-02-25 VITALS
WEIGHT: 145 LBS | BODY MASS INDEX: 28.47 KG/M2 | HEIGHT: 60 IN | OXYGEN SATURATION: 93 % | DIASTOLIC BLOOD PRESSURE: 80 MMHG | TEMPERATURE: 98 F | HEART RATE: 74 BPM | SYSTOLIC BLOOD PRESSURE: 130 MMHG

## 2025-02-25 DIAGNOSIS — R79.89 ELEVATED BRAIN NATRIURETIC PEPTIDE (BNP) LEVEL: ICD-10-CM

## 2025-02-25 DIAGNOSIS — R60.0 LOWER EXTREMITY EDEMA: ICD-10-CM

## 2025-02-25 PROCEDURE — 3079F DIAST BP 80-89 MM HG: CPT | Performed by: PHYSICIAN ASSISTANT

## 2025-02-25 PROCEDURE — 99214 OFFICE O/P EST MOD 30 MIN: CPT | Performed by: PHYSICIAN ASSISTANT

## 2025-02-25 PROCEDURE — 3075F SYST BP GE 130 - 139MM HG: CPT | Performed by: PHYSICIAN ASSISTANT

## 2025-02-25 ASSESSMENT — FIBROSIS 4 INDEX: FIB4 SCORE: 1.72

## 2025-02-26 NOTE — PROGRESS NOTES
cc: Follow-up lower extremity edema    Subjective:     HPI    History of Present Illness  The patient is a 89-year-old female presenting to follow up on lower extremity edema.    She was seen 1 week ago in the clinic with concerns of lower extremity edema that had been ongoing for about 1 week prior. She had been spending a lot more time seated due to a previous knee ablation. A venous ultrasound was obtained to rule out DVT, which was negative, focal, incidental finding of right Baker's cyst.  She was started on 20 mg of Lasix, advised to start once daily and increase to twice daily if edema was not improving.  Edema was not improving so they increased the Lasix 20 mg twice daily approximately 4 days ago.  Has noted slight improvement in the edema however still fairly moderate.  BNP was also slightly bumped.  She does have echo ordered per her cardiologist.  Has not been able to schedule echo yet.  She still experiencing moderate amount of knee pain, which has worsened since her ablation.  Has not been able to follow-up with her pain management specialist yet.  Does have tramadol.  They did get compression socks have not been able to put them on yet.  Denies redness, warmth, shortness of breath, chest pain, wheezing        Review of systems:  See above.    Latest Reference Range & Units 02/18/25 16:05   Sodium 135 - 145 mmol/L 139   Potassium 3.6 - 5.5 mmol/L 4.4   Chloride 96 - 112 mmol/L 104   Co2 20 - 33 mmol/L 25   Anion Gap 7.0 - 16.0  10.0   Glucose 65 - 99 mg/dL 103 (H)   Bun 8 - 22 mg/dL 30 (H)   Creatinine 0.50 - 1.40 mg/dL 0.76   GFR (CKD-EPI) >60 mL/min/1.73 m 2 71   Calcium 8.5 - 10.5 mg/dL 9.4   NT-proBNP 0 - 125 pg/mL 560 (H)   (H): Data is abnormally high    Current Outpatient Medications:     furosemide (LASIX) 20 MG Tab, TAKE 1 TABLET BY MOUTH TWICE DAILY, Disp: 180 Tablet, Rfl: 0    potassium chloride ER (KLOR-CON) 10 MEQ tablet, Take 1 Tablet by mouth 2 times a day., Disp: 60 Tablet, Rfl: 0     traMADol (ULTRAM) 50 MG Tab, Take 1 Tablet by mouth every 6 hours as needed for Moderate Pain for up to 30 days., Disp: 120 Tablet, Rfl: 0    lansoprazole (PREVACID) 30 MG CAPSULE DELAYED RELEASE, TAKE 1 CAPSULE BY MOUTH EVERY DAY, Disp: 90 Capsule, Rfl: 2    Dextromethorphan-guaiFENesin (MUCINEX DM)  MG TABLET SR 12 HR, Take 1 Each by mouth 3 times a day as needed (cough)., Disp: 30 Tablet, Rfl: 0    ipratropium-albuterol (DUONEB) 0.5-2.5 (3) MG/3ML nebulizer solution, Take 3 mL by nebulization every 6 hours as needed for Shortness of Breath (wheezing)., Disp: 30 Each, Rfl: 0    albuterol 108 (90 Base) MCG/ACT Aero Soln inhalation aerosol, Inhale 2 Puffs every four hours as needed for Shortness of Breath., Disp: 1 Each, Rfl: 1    fluticasone (FLONASE) 50 MCG/ACT nasal spray, Administer 1 Spray into affected nostril(S) every day., Disp: , Rfl:     cetirizine (ZYRTEC) 10 MG Tab, Take 10 mg by mouth every day., Disp: , Rfl:     clopidogrel (PLAVIX) 75 MG Tab, , Disp: , Rfl:     metoprolol SR (TOPROL XL) 50 MG TABLET SR 24 HR, Take 50 mg by mouth every day., Disp: , Rfl:     atorvastatin (LIPITOR) 80 MG tablet, Take 80 mg by mouth every evening., Disp: , Rfl:     Allergies, past medical history, past surgical history, family history, social history reviewed and updated    Objective:     Vitals: /80 (BP Location: Left arm, Patient Position: Sitting, BP Cuff Size: Adult)   Pulse 74   Temp 36.7 °C (98 °F) (Temporal)   Ht 1.524 m (5')   Wt 65.8 kg (145 lb)   SpO2 93%   BMI 28.32 kg/m²   General: Alert, pleasant, NAD  HEENT: Normocephalic. Neck supple.   Heart: Regular rate and rhythm.  S1 and S2 normal.  2/6 systolic murmurs appreciated.  Respiratory: Normal respiratory effort.  Clear to auscultation bilaterally.  Skin: Warm, dry, no rashes.  Extremities: 3+ pitting edema to the midshin on the left down to the foot.  2+ pitting edema to the right mid shin.  3+ pitting edema on the feet bilaterally.  No  redness or warmth  Psych:  Affect/mood is normal, judgement is good, memory is intact, grooming is appropriate.    Assessment/Plan:     Chantelle was seen today for follow-up.    Diagnoses and all orders for this visit:    Lower extremity edema  -Minimally improved.  It is difficult for her to stand and ambulate, so we cannot check her weight.  However did advise for them to start checking weight at home.  Will increase Lasix to 40 mg in the morning 20 mg at night.  If after 2 to 3 days still not noting much improvement in the edema increase Lasix to 40 mg twice daily in addition to potassium.  Check potassium level in 3 to 4 days.  Will follow-up in 2 weeks  -     Basic Metabolic Panel; Future    Elevated brain natriuretic peptide (BNP) level  -BNP was slightly bumped.  Does have echo ordered by her cardiologist.  Will monitor repeat.  -     proBrain Natriuretic Peptide, NT; Future        Return in about 2 weeks (around 3/11/2025) for edema.

## 2025-03-18 ENCOUNTER — APPOINTMENT (OUTPATIENT)
Dept: PHYSICAL MEDICINE AND REHAB | Facility: MEDICAL CENTER | Age: OVER 89
End: 2025-03-18
Payer: MEDICARE

## 2025-03-27 DIAGNOSIS — R60.0 LOWER EXTREMITY EDEMA: ICD-10-CM

## 2025-03-27 RX ORDER — TORSEMIDE 10 MG/1
10 TABLET ORAL DAILY
Qty: 15 TABLET | Refills: 0 | Status: SHIPPED | OUTPATIENT
Start: 2025-03-27 | End: 2025-03-31 | Stop reason: SDUPTHER

## 2025-03-31 ENCOUNTER — OFFICE VISIT (OUTPATIENT)
Dept: MEDICAL GROUP | Facility: MEDICAL CENTER | Age: OVER 89
End: 2025-03-31
Payer: MEDICARE

## 2025-03-31 VITALS
HEIGHT: 60 IN | BODY MASS INDEX: 27.56 KG/M2 | DIASTOLIC BLOOD PRESSURE: 66 MMHG | SYSTOLIC BLOOD PRESSURE: 120 MMHG | HEART RATE: 80 BPM | WEIGHT: 140.4 LBS | RESPIRATION RATE: 16 BRPM | TEMPERATURE: 97.7 F | OXYGEN SATURATION: 94 %

## 2025-03-31 DIAGNOSIS — I83.813 VARICOSE VEINS OF BOTH LOWER EXTREMITIES WITH PAIN: ICD-10-CM

## 2025-03-31 DIAGNOSIS — G89.29 CHRONIC BILATERAL LOW BACK PAIN WITHOUT SCIATICA: ICD-10-CM

## 2025-03-31 DIAGNOSIS — R60.0 PEDAL EDEMA: ICD-10-CM

## 2025-03-31 DIAGNOSIS — R60.0 LOWER EXTREMITY EDEMA: ICD-10-CM

## 2025-03-31 DIAGNOSIS — M79.605 CHRONIC PAIN OF BOTH LOWER EXTREMITIES: ICD-10-CM

## 2025-03-31 DIAGNOSIS — Z95.5 S/P CORONARY ARTERY STENT PLACEMENT: ICD-10-CM

## 2025-03-31 DIAGNOSIS — M54.50 CHRONIC BILATERAL LOW BACK PAIN WITHOUT SCIATICA: ICD-10-CM

## 2025-03-31 DIAGNOSIS — G89.29 CHRONIC PAIN OF BOTH LOWER EXTREMITIES: ICD-10-CM

## 2025-03-31 DIAGNOSIS — M79.604 CHRONIC PAIN OF BOTH LOWER EXTREMITIES: ICD-10-CM

## 2025-03-31 PROCEDURE — 99214 OFFICE O/P EST MOD 30 MIN: CPT | Performed by: STUDENT IN AN ORGANIZED HEALTH CARE EDUCATION/TRAINING PROGRAM

## 2025-03-31 PROCEDURE — 3074F SYST BP LT 130 MM HG: CPT | Performed by: STUDENT IN AN ORGANIZED HEALTH CARE EDUCATION/TRAINING PROGRAM

## 2025-03-31 PROCEDURE — 3078F DIAST BP <80 MM HG: CPT | Performed by: STUDENT IN AN ORGANIZED HEALTH CARE EDUCATION/TRAINING PROGRAM

## 2025-03-31 RX ORDER — POTASSIUM CHLORIDE 750 MG/1
10 TABLET, EXTENDED RELEASE ORAL DAILY
Qty: 180 TABLET | Refills: 0 | Status: SHIPPED | OUTPATIENT
Start: 2025-03-31

## 2025-03-31 RX ORDER — TRAMADOL HYDROCHLORIDE 50 MG/1
50 TABLET ORAL EVERY 8 HOURS PRN
COMMUNITY
End: 2025-03-31 | Stop reason: SDUPTHER

## 2025-03-31 RX ORDER — TORSEMIDE 10 MG/1
10 TABLET ORAL DAILY
Qty: 90 TABLET | Refills: 0 | Status: SHIPPED | OUTPATIENT
Start: 2025-03-31

## 2025-03-31 RX ORDER — TRAMADOL HYDROCHLORIDE 50 MG/1
50 TABLET ORAL EVERY 8 HOURS PRN
Qty: 90 TABLET | Refills: 0 | Status: SHIPPED | OUTPATIENT
Start: 2025-03-31 | End: 2025-04-30

## 2025-03-31 ASSESSMENT — FIBROSIS 4 INDEX: FIB4 SCORE: 1.72

## 2025-03-31 NOTE — PROGRESS NOTES
Subjective:     CC:   Chief Complaint   Patient presents with   • Leg Swelling   • Knee Pain       HPI:   Chantelle presents today with      No problems updated.    Health Maintenance: Completed    ROS:  ROS    Review of systems unremarkable except for concerns noted by patient or items listed.    Please see HPI for additional ROS.      Objective:     Exam:  /66 (BP Location: Left arm, Patient Position: Sitting, BP Cuff Size: Adult)   Pulse 80   Temp 36.5 °C (97.7 °F) (Temporal)   Resp 16   Ht 1.524 m (5')   Wt 63.7 kg (140 lb 6.4 oz)   SpO2 94%   BMI 27.42 kg/m²  Body mass index is 27.42 kg/m².    Physical Exam        Labs: reviewed     Assessment & Plan:     89 y.o. female with the following -     1. Pedal edema  ***  - Referral to Vascular Medicine    2. Varicose veins of both lower extremities with pain  ***  - Referral to Vascular Medicine  - ESTIMATED GFR; Future    3. Lower extremity edema  ***  - torsemide (DEMADEX) 10 MG tablet; Take 1 Tablet by mouth every day.  Dispense: 90 Tablet; Refill: 0  - potassium chloride ER (KLOR-CON) 10 MEQ tablet; Take 1 Tablet by mouth every day.  Dispense: 180 Tablet; Refill: 0  - ESTIMATED GFR; Future  - Basic Metabolic Panel; Future  - Referral to Home Health    4. Chronic pain of both lower extremities  [place] [facility] - getting established   - Referral to Home Health  - traMADol (ULTRAM) 50 MG Tab; Take 1 Tablet by mouth every 8 hours as needed for Severe Pain for up to 30 days.  Dispense: 90 Tablet; Refill: 0  - Controlled Substance Treatment Agreement    5. Chronic bilateral low back pain without sciatica  ***  - traMADol (ULTRAM) 50 MG Tab; Take 1 Tablet by mouth every 8 hours as needed for Severe Pain for up to 30 days.  Dispense: 90 Tablet; Refill: 0  - Controlled Substance Treatment Agreement    6. S/P coronary artery stent placement  ***          Referral for genetic research was offered. Patient {declined/accepted}.    I spent a total  of *** minutes with record review, exam, communication with the patient, communication with other providers, and documentation of this encounter.      No follow-ups on file.    Please note that this dictation was created using voice recognition software. I have made every reasonable attempt to correct obvious errors, but I expect that there are errors of grammar and possibly content that I did not discover before finalizing the note.         morning for 5  days until improvement    - Reduce to one tablet daily after improvement    - hold off medication on days with no swelling or low BP ( < 100 SBP)   - Regular laboratory tests every 2 to 3 months recommended  - Prescription for potassium 10 mEq    - Take one extra tablet on days when two diuretics are taken  - Standing order for laboratory tests  - Skilled nursing order to monitor blood pressure, leg condition, wound healing, and ensure proper medication management    - Referral to Vascular Medicine  - ESTIMATED GFR; Future  - torsemide (DEMADEX) 10 MG tablet; Take 1 Tablet by mouth every day.  Dispense: 90 Tablet; Refill: 0  - potassium chloride ER (KLOR-CON) 10 MEQ tablet; Take 1 Tablet by mouth every day.  Dispense: 180 Tablet; Refill: 0  - ESTIMATED GFR; Future  - Basic Metabolic Panel; Future  - Referral to Home Health    3. Chronic pain of both lower extremities  4. Chronic bilateral low back pain without sciatica  Chronic problem, stable  Previously seen by Dr Avalos at [facility] who was treating with tramadol for pain management   Patient is trying to get established with [place] [facility]   - for a short period , until gets established requesting refill of tramadol   Tolerated well in past without side effects  Plan:  Controlled substance discussed with client. Client agrees to abide by controlled substance contract.  PDMP reviewed . shows no abnormal prescribing or filling behavior.    The treatment plan was reviewed and discussed with the patient. The pharmacy monitoring report was requested and reviewed.  Patient is appropriate for refill of this medication.  -Patient informed no medication adjustments will be made to titrate up or add additional narcotics, benzodiazepines or other controlled substances to this regimen.  Referral to pain management or behavioral health will be made as appropriate.    - Referral to Home Health  - traMADol (ULTRAM) 50 MG Tab; Take 1 Tablet by  mouth every 8 hours as needed for Severe Pain for up to 30 days.  Dispense: 90 Tablet; Refill: 0  - Controlled Substance Treatment Agreement    Follow-up  - Scheduled for a follow-up visit on 04/18/2025 for a physical examination        4-8 weeks f/u    Please note that this dictation was created using voice recognition software. I have made every reasonable attempt to correct obvious errors, but I expect that there are errors of grammar and possibly content that I did not discover before finalizing the note.

## 2025-04-03 ENCOUNTER — HOSPITAL ENCOUNTER (OUTPATIENT)
Facility: MEDICAL CENTER | Age: OVER 89
End: 2025-04-03
Attending: STUDENT IN AN ORGANIZED HEALTH CARE EDUCATION/TRAINING PROGRAM
Payer: MEDICARE

## 2025-04-03 ENCOUNTER — TELEPHONE (OUTPATIENT)
Dept: HEALTH INFORMATION MANAGEMENT | Facility: OTHER | Age: OVER 89
End: 2025-04-03

## 2025-04-03 LAB
ANION GAP SERPL CALC-SCNC: 14 MMOL/L (ref 7–16)
BUN SERPL-MCNC: 42 MG/DL (ref 8–22)
CALCIUM SERPL-MCNC: 9.3 MG/DL (ref 8.5–10.5)
CHLORIDE SERPL-SCNC: 97 MMOL/L (ref 96–112)
CO2 SERPL-SCNC: 26 MMOL/L (ref 20–33)
CREAT SERPL-MCNC: 0.96 MG/DL (ref 0.5–1.4)
GFR SERPLBLD CREATININE-BSD FMLA CKD-EPI: 54 ML/MIN/1.73 M 2
GLUCOSE SERPL-MCNC: 106 MG/DL (ref 65–99)
POTASSIUM SERPL-SCNC: 3.8 MMOL/L (ref 3.6–5.5)
SODIUM SERPL-SCNC: 137 MMOL/L (ref 135–145)

## 2025-04-03 PROCEDURE — 80048 BASIC METABOLIC PNL TOTAL CA: CPT

## 2025-04-03 NOTE — Clinical Note
REFERRAL APPROVAL NOTICE         Sent on April 3, 2025                   Chantelle Sánchez  [address] [address]                   Dear Ms. Sánchez,    After a careful review of the medical information and benefit coverage, [facility] has processed your referral. See below for additional details.    If applicable, you must be actively enrolled with your insurance for coverage of the authorized service. If you have any questions regarding your coverage, please contact your insurance directly.    REFERRAL INFORMATION   Referral #:  [id number]  Referred-To Department    Referred-By Provider:  Vascular Medicine    Tali López M.D.   Vascular Medicine      [address]  [address]  [facility] [address]  [phone] [address] [place] [id number]  [phone]    Referral Start Date:  03/31/2025  Referral End Date:   03/31/2026             SCHEDULING  If you do not already have an appointment, please call [phone] to make an appointment.     MORE INFORMATION  If you do not already have a [employer] account, sign up at: [email or web address]  You can access your medical information, make appointments, see lab results, billing information, and more.  If you have questions regarding this referral, please contact  the [facility] Referrals department at:             [phone]. Monday - Friday 8:00AM - 5:00PM.     Sincerely,    [facility]

## 2025-04-08 DIAGNOSIS — I25.10 CORONARY ARTERY DISEASE INVOLVING NATIVE CORONARY ARTERY OF NATIVE HEART WITHOUT ANGINA PECTORIS: ICD-10-CM

## 2025-04-08 DIAGNOSIS — E78.5 HYPERLIPIDEMIA, UNSPECIFIED HYPERLIPIDEMIA TYPE: ICD-10-CM

## 2025-04-08 DIAGNOSIS — I25.2 HISTORY OF MI (MYOCARDIAL INFARCTION): ICD-10-CM

## 2025-04-08 DIAGNOSIS — Z95.5 S/P CORONARY ARTERY STENT PLACEMENT: ICD-10-CM

## 2025-04-08 RX ORDER — CLOPIDOGREL BISULFATE 75 MG/1
75 TABLET ORAL DAILY
Qty: 90 TABLET | Refills: 1 | Status: SHIPPED | OUTPATIENT
Start: 2025-04-08

## 2025-04-08 NOTE — TELEPHONE ENCOUNTER
Received request via: Patient    Was the patient seen in the last year in this department? Yes    Does the patient have an active prescription (recently filled or refills available) for medication(s) requested?  yes    [id number] - [place], [place] - [address] [[id number]]     Does the patient have FPC Plus and need 100-day supply? (This applies to ALL medications) Patient does not have SCP    Thank you,     Adam GILLIS

## 2025-04-17 SDOH — HEALTH STABILITY: PHYSICAL HEALTH: ON AVERAGE, HOW MANY DAYS PER WEEK DO YOU ENGAGE IN MODERATE TO STRENUOUS EXERCISE (LIKE A BRISK WALK)?: 0 DAYS

## 2025-04-17 SDOH — HEALTH STABILITY: PHYSICAL HEALTH: ON AVERAGE, HOW MANY MINUTES DO YOU ENGAGE IN EXERCISE AT THIS LEVEL?: 0 MIN

## 2025-04-17 SDOH — ECONOMIC STABILITY: INCOME INSECURITY: IN THE LAST 12 MONTHS, WAS THERE A TIME WHEN YOU WERE NOT ABLE TO PAY THE MORTGAGE OR RENT ON TIME?: NO

## 2025-04-17 SDOH — HEALTH STABILITY: MENTAL HEALTH
STRESS IS WHEN SOMEONE FEELS TENSE, NERVOUS, ANXIOUS, OR CAN'T SLEEP AT NIGHT BECAUSE THEIR MIND IS TROUBLED. HOW STRESSED ARE YOU?: TO SOME EXTENT

## 2025-04-17 SDOH — ECONOMIC STABILITY: FOOD INSECURITY: WITHIN THE PAST 12 MONTHS, THE FOOD YOU BOUGHT JUST DIDN'T LAST AND YOU DIDN'T HAVE MONEY TO GET MORE.: NEVER TRUE

## 2025-04-17 SDOH — ECONOMIC STABILITY: INCOME INSECURITY: HOW HARD IS IT FOR YOU TO PAY FOR THE VERY BASICS LIKE FOOD, HOUSING, MEDICAL CARE, AND HEATING?: NOT VERY HARD

## 2025-04-17 SDOH — ECONOMIC STABILITY: HOUSING INSECURITY
IN THE LAST 12 MONTHS, WAS THERE A TIME WHEN YOU DID NOT HAVE A STEADY PLACE TO SLEEP OR SLEPT IN A SHELTER (INCLUDING NOW)?: NO

## 2025-04-17 SDOH — ECONOMIC STABILITY: FOOD INSECURITY: WITHIN THE PAST 12 MONTHS, YOU WORRIED THAT YOUR FOOD WOULD RUN OUT BEFORE YOU GOT MONEY TO BUY MORE.: NEVER TRUE

## 2025-04-17 SDOH — ECONOMIC STABILITY: TRANSPORTATION INSECURITY
IN THE PAST 12 MONTHS, HAS THE LACK OF TRANSPORTATION KEPT YOU FROM MEDICAL APPOINTMENTS OR FROM GETTING MEDICATIONS?: NO

## 2025-04-17 SDOH — ECONOMIC STABILITY: TRANSPORTATION INSECURITY
IN THE PAST 12 MONTHS, HAS LACK OF RELIABLE TRANSPORTATION KEPT YOU FROM MEDICAL APPOINTMENTS, MEETINGS, WORK OR FROM GETTING THINGS NEEDED FOR DAILY LIVING?: NO

## 2025-04-17 SDOH — ECONOMIC STABILITY: TRANSPORTATION INSECURITY
IN THE PAST 12 MONTHS, HAS LACK OF TRANSPORTATION KEPT YOU FROM MEETINGS, WORK, OR FROM GETTING THINGS NEEDED FOR DAILY LIVING?: NO

## 2025-04-17 ASSESSMENT — LIFESTYLE VARIABLES
HOW OFTEN DO YOU HAVE SIX OR MORE DRINKS ON ONE OCCASION: NEVER
AUDIT-C TOTAL SCORE: 0
HOW OFTEN DO YOU HAVE A DRINK CONTAINING ALCOHOL: NEVER
SKIP TO QUESTIONS 9-10: 1
HOW MANY STANDARD DRINKS CONTAINING ALCOHOL DO YOU HAVE ON A TYPICAL DAY: PATIENT DOES NOT DRINK

## 2025-04-17 ASSESSMENT — SOCIAL DETERMINANTS OF HEALTH (SDOH)
HOW OFTEN DO YOU ATTEND CHURCH OR RELIGIOUS SERVICES?: NEVER
HOW OFTEN DO YOU HAVE SIX OR MORE DRINKS ON ONE OCCASION: NEVER
HOW MANY DRINKS CONTAINING ALCOHOL DO YOU HAVE ON A TYPICAL DAY WHEN YOU ARE DRINKING: PATIENT DOES NOT DRINK
IN A TYPICAL WEEK, HOW MANY TIMES DO YOU TALK ON THE PHONE WITH FAMILY, FRIENDS, OR NEIGHBORS?: TWICE A WEEK
HOW HARD IS IT FOR YOU TO PAY FOR THE VERY BASICS LIKE FOOD, HOUSING, MEDICAL CARE, AND HEATING?: NOT VERY HARD
HOW OFTEN DO YOU GET TOGETHER WITH FRIENDS OR RELATIVES?: MORE THAN THREE TIMES A WEEK
WITHIN THE PAST 12 MONTHS, YOU WORRIED THAT YOUR FOOD WOULD RUN OUT BEFORE YOU GOT THE MONEY TO BUY MORE: NEVER TRUE
HOW OFTEN DO YOU GET TOGETHER WITH FRIENDS OR RELATIVES?: MORE THAN THREE TIMES A WEEK
IN THE PAST 12 MONTHS, HAS THE ELECTRIC, GAS, OIL, OR WATER COMPANY THREATENED TO SHUT OFF SERVICE IN YOUR HOME?: NO
HOW OFTEN DO YOU ATTEND CHURCH OR RELIGIOUS SERVICES?: NEVER
IN A TYPICAL WEEK, HOW MANY TIMES DO YOU TALK ON THE PHONE WITH FAMILY, FRIENDS, OR NEIGHBORS?: TWICE A WEEK
DO YOU BELONG TO ANY CLUBS OR ORGANIZATIONS SUCH AS CHURCH GROUPS UNIONS, FRATERNAL OR ATHLETIC GROUPS, OR SCHOOL GROUPS?: NO
DO YOU BELONG TO ANY CLUBS OR ORGANIZATIONS SUCH AS CHURCH GROUPS UNIONS, FRATERNAL OR ATHLETIC GROUPS, OR SCHOOL GROUPS?: NO
HOW OFTEN DO YOU ATTENT MEETINGS OF THE CLUB OR ORGANIZATION YOU BELONG TO?: NEVER
HOW OFTEN DO YOU HAVE A DRINK CONTAINING ALCOHOL: NEVER
HOW OFTEN DO YOU ATTENT MEETINGS OF THE CLUB OR ORGANIZATION YOU BELONG TO?: NEVER

## 2025-04-18 ENCOUNTER — APPOINTMENT (OUTPATIENT)
Dept: MEDICAL GROUP | Facility: MEDICAL CENTER | Age: OVER 89
End: 2025-04-18
Payer: MEDICARE

## 2025-04-18 VITALS
DIASTOLIC BLOOD PRESSURE: 60 MMHG | BODY MASS INDEX: 28.27 KG/M2 | HEART RATE: 64 BPM | SYSTOLIC BLOOD PRESSURE: 126 MMHG | TEMPERATURE: 98 F | WEIGHT: 144 LBS | OXYGEN SATURATION: 93 % | HEIGHT: 60 IN

## 2025-04-18 DIAGNOSIS — G89.29 CHRONIC PAIN OF BOTH KNEES: ICD-10-CM

## 2025-04-18 DIAGNOSIS — R94.4 ABNORMAL RENAL FUNCTION TEST: ICD-10-CM

## 2025-04-18 DIAGNOSIS — M54.50 CHRONIC BILATERAL LOW BACK PAIN WITHOUT SCIATICA: ICD-10-CM

## 2025-04-18 DIAGNOSIS — F51.01 PRIMARY INSOMNIA: ICD-10-CM

## 2025-04-18 DIAGNOSIS — Z00.00 ENCOUNTER FOR MEDICARE ANNUAL WELLNESS EXAM: Primary | ICD-10-CM

## 2025-04-18 DIAGNOSIS — G89.29 CHRONIC BILATERAL LOW BACK PAIN WITHOUT SCIATICA: ICD-10-CM

## 2025-04-18 DIAGNOSIS — Z86.79 STATUS POST ABLATION OF ATRIAL FIBRILLATION: ICD-10-CM

## 2025-04-18 DIAGNOSIS — M25.561 CHRONIC PAIN OF BOTH KNEES: ICD-10-CM

## 2025-04-18 DIAGNOSIS — M25.562 CHRONIC PAIN OF BOTH KNEES: ICD-10-CM

## 2025-04-18 DIAGNOSIS — Z98.890 STATUS POST ABLATION OF ATRIAL FIBRILLATION: ICD-10-CM

## 2025-04-18 DIAGNOSIS — R60.0 PEDAL EDEMA: ICD-10-CM

## 2025-04-18 DIAGNOSIS — E78.00 PURE HYPERCHOLESTEROLEMIA: ICD-10-CM

## 2025-04-18 DIAGNOSIS — Z95.5 S/P CORONARY ARTERY STENT PLACEMENT: ICD-10-CM

## 2025-04-18 PROBLEM — R55 SYNCOPE AND COLLAPSE: Status: RESOLVED | Noted: 2024-01-22 | Resolved: 2025-04-18

## 2025-04-18 RX ORDER — TRAZODONE HYDROCHLORIDE 50 MG/1
50 TABLET ORAL NIGHTLY
Qty: 90 TABLET | Refills: 1 | Status: SHIPPED | OUTPATIENT
Start: 2025-04-18

## 2025-04-18 ASSESSMENT — ACTIVITIES OF DAILY LIVING (ADL): BATHING_REQUIRES_ASSISTANCE: 1

## 2025-04-18 ASSESSMENT — FIBROSIS 4 INDEX: FIB4 SCORE: 1.72

## 2025-04-18 ASSESSMENT — ENCOUNTER SYMPTOMS: GENERAL WELL-BEING: FAIR

## 2025-04-18 ASSESSMENT — PATIENT HEALTH QUESTIONNAIRE - PHQ9: CLINICAL INTERPRETATION OF PHQ2 SCORE: 0

## 2025-04-18 NOTE — PROGRESS NOTES
Chief Complaint   Patient presents with    Annual Exam    Leg Swelling       HPI:  Chantelle Sánchez is a 89 y.o. here for Medicare Annual Wellness Visit     Patient Active Problem List    Diagnosis Date Noted    Pedal edema 04/18/2025    Aneurysm (HCC) 04/11/2024    Status post ablation of atrial fibrillation 04/11/2024    Aortic valve regurgitation 04/11/2024    Hyperlipidemia 04/11/2024    CAD (coronary artery disease) 01/22/2024    Primary osteoarthritis of both knees 10/12/2023    History of MI (myocardial infarction) 09/11/2023    S/P coronary artery stent placement 09/11/2023    Chronic bilateral low back pain without sciatica 09/11/2023    Chronic pain of both knees 09/11/2023    Chronic cough 09/11/2023    Seasonal allergies 09/11/2023    Macular degeneration of right eye 09/11/2023       Current Outpatient Medications   Medication Sig Dispense Refill    traZODone (DESYREL) 50 MG Tab Take 1 Tablet by mouth every evening. 90 Tablet 1    clopidogrel (PLAVIX) 75 MG Tab Take 1 Tablet by mouth every day. 90 Tablet 1    torsemide (DEMADEX) 10 MG tablet Take 1 Tablet by mouth every day. 90 Tablet 0    potassium chloride ER (KLOR-CON) 10 MEQ tablet Take 1 Tablet by mouth every day. 180 Tablet 0    traMADol (ULTRAM) 50 MG Tab Take 1 Tablet by mouth every 8 hours as needed for Severe Pain for up to 30 days. 90 Tablet 0    lansoprazole (PREVACID) 30 MG CAPSULE DELAYED RELEASE TAKE 1 CAPSULE BY MOUTH EVERY DAY 90 Capsule 2    Dextromethorphan-guaiFENesin (MUCINEX DM)  MG TABLET SR 12 HR Take 1 Each by mouth 3 times a day as needed (cough). 30 Tablet 0    ipratropium-albuterol (DUONEB) 0.5-2.5 (3) MG/3ML nebulizer solution Take 3 mL by nebulization every 6 hours as needed for Shortness of Breath (wheezing). 30 Each 0    albuterol 108 (90 Base) MCG/ACT Aero Soln inhalation aerosol Inhale 2 Puffs every four hours as needed for Shortness of Breath. 1 Each 1    fluticasone (FLONASE) 50 MCG/ACT nasal spray Administer  1 Spray into affected nostril(S) every day.      cetirizine (ZYRTEC) 10 MG Tab Take 10 mg by mouth every day.      metoprolol SR (TOPROL XL) 50 MG TABLET SR 24 HR Take 50 mg by mouth every day.      atorvastatin (LIPITOR) 80 MG tablet Take 80 mg by mouth every evening.       No current facility-administered medications for this visit.          Current supplements as per medication list.     Allergies: Patient has no known allergies.    Current social contact/activities:      She  reports that she has never smoked. She has never used smokeless tobacco. She reports that she does not currently use alcohol. She reports that she does not currently use drugs.  Counseling given: Not Answered      ROS:    Gait: Uses a walker  Ostomy: No  Other tubes: No  Amputations: No  Chronic oxygen use: No  Last eye exam: over a year  Wears hearing aids: No   : Denies any urinary leakage during the last 6 months    Screening:    Depression Screening  Little interest or pleasure in doing things?  0 - not at all  Feeling down, depressed , or hopeless? 0 - not at all  Patient Health Questionnaire Score: 0     If depressive symptoms identified deferred to follow up visit unless specifically addressed in assessment and plan.    Interpretation of PHQ-9 Total Score   Score Severity   1-4 No Depression   5-9 Mild Depression   10-14 Moderate Depression   15-19 Moderately Severe Depression   20-27 Severe Depression    Screening for Cognitive Impairment  Do you or any of your friends or family members have any concern about your memory? No  Three Minute Recall (Village, Kitchen, Baby) 2/3    [place]smel clock face with all 12 numbers and set the hands to show 10 minutes past 11.  Yes    Cognitive concerns identified deferred for follow up unless specifically addressed in assessment and plan.    Fall Risk Assessment  Has the patient had two or more falls in the last year or any fall with injury in the last year?  No    Safety Assessment  Do you always  wear your seatbelt?  Yes  Any changes to home needed to function safely? No  Difficulty hearing.  Yes  Patient counseled about all safety risks that were identified.    Functional Assessment ADLs  Are there any barriers preventing you from cooking for yourself or meeting nutritional needs?  Yes.    Are there any barriers preventing you from driving safely or obtaining transportation?  No.    Are there any barriers preventing you from using a telephone or calling for help?  No    Are there any barriers preventing you from shopping?  Yes.    Are there any barriers preventing you from taking care of your own finances?  No    Are there any barriers preventing you from managing your medications?  No    Are there any barriers preventing you from showering, bathing or dressing yourself? Yes    Are there any barriers preventing you from doing housework or laundry? Yes  Are there any barriers preventing you from using the toilet?No  Are you currently engaging in any exercise or physical activity?  No.      Self-Assessment of Health  What is your perception of your health? Fair    Do you sleep more than six hours a night? No    In the past 7 days, how much did pain keep you from doing your normal work? Some    Do you spend quality time with family or friends (virtually or in person)? Yes    Do you usually eat a heart healthy diet that constists of a variety of fruits, vegetables, whole grains and fiber? Yes    Do you eat foods high in fat and/or Fast Food more than three times per week? No    How concerned are you that your medical conditions are not being well managed? a little    Are you worried that in the next 2 months, you may not have stable housing that you own, rent, or stay in as part of a household? No      Advance Care Planning  Do you have an Advance Directive, Living Will, Durable Power of , or POLST? Yes        POLST is on file      Health Maintenance Summary            Current Care Gaps       COVID-19  Vaccine (3 - 2024-25 season) Overdue since 4/4/2025      10/04/2024  Outside Immunization: COVID-19(PFR) 12yrs \T\ up    10/08/2023  Imm Admin: Comirnaty (Covid-19 Vaccine, Mrna, 1824-1516 Formula)              Bone Density Scan (Every 5 Years) Never done     No completion history exists for this topic.              IMM DTaP/Tdap/Td Vaccine (1 - Tdap) Never done     No completion history exists for this topic.              Zoster (Shingles) Vaccines (1 of 2) Never done     No completion history exists for this topic.                      Upcoming       Annual Wellness Visit (Yearly) Next due on 4/18/2026 04/18/2025  Level of Service: ND ANNUAL WELLNESS VISIT-INCLUDES PPPS SUBSEQUE*    04/18/2025  Visit Dx: Encounter for Medicare annual wellness exam                      Completed or No Longer Recommended       Influenza Vaccine (Series Information) Completed      10/04/2024  Outside Immunization: Influenza, High Dose    09/11/2023  Imm Admin: Influenza Vaccine Adult HD              Pneumococcal Vaccine: 50+ Years (Series Information) Completed      09/11/2023  Imm Admin: Pneumococcal Conjugate Vaccine (PCV20)              Hepatitis A Vaccine (Hep A) (Series Information) Aged Out     No completion history exists for this topic.              Hepatitis B Vaccine (Hep B) (Series Information) Aged Out     No completion history exists for this topic.              HPV Vaccines (Series Information) Aged Out     No completion history exists for this topic.              Polio Vaccine (Inactivated Polio) (Series Information) Aged Out     No completion history exists for this topic.              Meningococcal Immunization (Series Information) Aged Out     No completion history exists for this topic.                            Patient Care Team:  Tali López M.D. as PCP - General (Internal Medicine)        Social History     Tobacco Use    Smoking status: Never    Smokeless tobacco: Never   Vaping Use    Vaping status:  Never Used   Substance Use Topics    Alcohol use: Not Currently    Drug use: Not Currently     Family History   Problem Relation Age of Onset    Heart Disease Son     Hyperlipidemia Son     Hyperlipidemia Daughter      She  has a past medical history of Arthritis, Congestive heart failure (HCC), Coronary artery disease, Heart disease, High cholesterol, History of heart artery stent, History of heart attack, Hyperlipidemia, Hypertension, Myocardial infarct (HCC) (2020), Osteoarthritis, Pain (knees), Spinal disorder, Stomach ulcer, and Syncope and collapse (01/22/2024).   Past Surgical History:   Procedure Laterality Date    [place] NEUROLYTIC DEST GENICULAR NERVE Left 02/05/2025    Procedure: LEFT genicular nerve neurotomy with sedation….Note: Patient needs clearance to be off of Plavix for 5 days prior to the procedure.  Plan on the left knee approximately 2 weeks before the right knee.  Plan on sedation with 25mcg fentanyl.;  Surgeon: Enrique Louise M.D.;  [facility];  Service: Pain Management    [place] INJ AA/STRD GNCLR NRV BRNCH W/IMG Bilateral 01/14/2025    Procedure: bilateral knee genicular nerve blocks with fluoroscopic guidance;  Surgeon: Enrique Louise M.D.;  [facility];  Service: Pain Management    [place] INJ AA/STRD GNCLR NRV BRNCH W/IMG Left 11/06/2024    Procedure: LEFT knee genicular nerve blocks with fluoroscopic guidance….Note: OK to continue plavix. DEA guidelines reviewed. Left knee approximately 1 week before the right.;  Surgeon: Enrique Louise M.D.;  [facility];  Service: Pain Management    [place] INJ LUMBAR/SACRAL,W/ IMAGING N/A 04/23/2024    Procedure: Lumbar L5-S1 interlaminar epidural steroid injection…..Note: Patient needs clearance to be off of Plavix for 5 days prior to the procedure;  Surgeon: Enrique Louise M.D.;  [facility];  Service: Pain Management    CARPAL TUNNEL RELEASE       CATARACT EXTRACTION WITH IOL      KNEE ARTHROSCOPY      ORIF, WRIST  1970    broken    OTHER ABDOMINAL SURGERY  1985    OTHER CARDIAC SURGERY  06/2020    OTHER ORTHOPEDIC SURGERY      toe    RHINOPLASTY         Exam:   /60 (BP Location: Left arm, Patient Position: Sitting, BP Cuff Size: Adult long)   Pulse 64   Temp 36.7 °C (98 °F) (Temporal)   Ht 1.524 m (5')   Wt 65.3 kg (144 lb)   SpO2 93%  Body mass index is 28.12 kg/m².    Hearing excellent.    Dentition good  Alert, oriented in no acute distress.  Eye contact is good, speech goal directed, affect calm    Assessment and Plan. The following treatment and monitoring plan is recommended:    1. Encounter for Medicare annual wellness exam  Services suggested: No services needed at this time  Health Care Screening: Age-appropriate preventive services recommended by USPTF and ACIP covered by Medicare were discussed today. Services ordered if indicated and agreed upon by the patient.  Referrals offered: Community-based lifestyle interventions to reduce health risks and promote self-management and wellness, fall prevention, nutrition, physical activity, tobacco-use cessation, weight loss, and mental health services as per orders if indicated.    Discussion today about general wellness and lifestyle habits:    Prevent falls and reduce trip hazards; Cautioned about securing or removing rugs.  Have a working fire alarm and carbon monoxide detector;   Engage in regular physical activity and social activities   2. Status post ablation of atrial fibrillation  Patient with history of atrial fibrillation.  Now status post ablation  She is currently on metoprolol 50 mg once daily not on anticoagulation therapy.  She takes Plavix 75 mg for history of CAD with stent  Following up with cardiology       3. S/P coronary artery stent placement  Continue with medication optimization currently on lipitor 80 mg daily , plavix 75 mg and metoprolol.  Has history of  stents  Currently chest pain-free    4. Pure hypercholesterolemia  Chronic, stable   Continue  lipitor 80 mg daily   If allergic to statin/or side effects : Zetia 10 mg     Recommended to continue low fat healthy diet and regular exercise        5. Chronic pain of both knees  Chronic, stable  Currently on tramadol 50 mg as needed  Previously with Dr Avalos now establishing with [facility]   Discussed with patient TO AVOID NSAIDs--  risk of GI bleed/increased risk of bleed with NSAIDS while on plavix .    6. Pedal edema  Chronic , uncontrolled   'still continues to have left leg swelling > right   Likely contributing factors include - left knee severe arthritis and decreased activity   Or  venous stasis in lower extremities)  - Left leg exhibits more pronounced swelling than the right  - Referral to a vascular specialist for further evaluation  - Continue using compression socks and elevate legs while sitting  - Maintain current regimen of torsemide 10 mg. Tried 20 mg dose with not much difference   - Regular laboratory tests every 2 to 3 months recommended  - Prescription for potassium 10 mEq  - Skilled nursing order to monitor blood pressure, leg condition, wound healing, and ensure proper medication management    7. Chronic bilateral low back pain without sciatica  Chronic problem, stable  Previously seen by Dr Avalos at [facility] who was treating with tramadol for pain management   Patient is trying to get established with [facility]        8. Primary insomnia  Chronic, uncontrolled  Reports having trouble sleeping   Requesting for prescription to help   Discussed risk and benefits of medications  Verbalized understanding   Plan:  - traZODone (DESYREL) 50 MG Tab; Take 1 Tablet by mouth every evening.  Dispense: 90 Tablet; Refill: 1    9. Abnormal renal function test   Acute   Most likely due to diuretics use as the gFR decreased after addition of diuretics and with increase of the dose of  torsemide.   Plan:  Repeat labs in 4-6 weeks   Decrease the dose of torsemide to 20 mg daily as no benefits from higher dose and change in kidney numbers. Referral to nephrology if renal function does not improve in next labs       Follow-up: 3-4 months

## 2025-04-27 PROBLEM — F51.01 PRIMARY INSOMNIA: Status: ACTIVE | Noted: 2025-04-27

## 2025-04-27 PROBLEM — R94.4 ABNORMAL RENAL FUNCTION TEST: Status: ACTIVE | Noted: 2025-04-27

## 2025-05-15 ENCOUNTER — PATIENT MESSAGE (OUTPATIENT)
Dept: MEDICAL GROUP | Facility: MEDICAL CENTER | Age: OVER 89
End: 2025-05-15
Payer: MEDICARE

## 2025-05-15 DIAGNOSIS — R60.0 LOWER EXTREMITY EDEMA: ICD-10-CM

## 2025-05-16 NOTE — PATIENT COMMUNICATION
Received request via: Patient    Was the patient seen in the last year in this department? Yes    Does the patient have an active prescription (recently filled or refills available) for medication(s) requested? No    Does the patient have care home Plus and need 100-day supply? (This applies to ALL medications) Patient does not have SCP

## 2025-05-17 RX ORDER — TORSEMIDE 10 MG/1
10 TABLET ORAL DAILY
Qty: 90 TABLET | Refills: 0 | Status: SHIPPED | OUTPATIENT
Start: 2025-05-17

## 2025-05-21 ENCOUNTER — TELEPHONE (OUTPATIENT)
Dept: VASCULAR LAB | Facility: MEDICAL CENTER | Age: OVER 89
End: 2025-05-21

## 2025-05-21 ENCOUNTER — HOSPITAL ENCOUNTER (OUTPATIENT)
Dept: RADIOLOGY | Facility: MEDICAL CENTER | Age: OVER 89
End: 2025-05-21
Attending: FAMILY MEDICINE
Payer: MEDICARE

## 2025-05-21 ENCOUNTER — RESULTS FOLLOW-UP (OUTPATIENT)
Dept: VASCULAR LAB | Facility: MEDICAL CENTER | Age: OVER 89
End: 2025-05-21

## 2025-05-21 ENCOUNTER — OFFICE VISIT (OUTPATIENT)
Dept: VASCULAR LAB | Facility: MEDICAL CENTER | Age: OVER 89
End: 2025-05-21
Attending: FAMILY MEDICINE
Payer: MEDICARE

## 2025-05-21 VITALS
HEIGHT: 60 IN | DIASTOLIC BLOOD PRESSURE: 77 MMHG | SYSTOLIC BLOOD PRESSURE: 135 MMHG | HEART RATE: 64 BPM | WEIGHT: 145 LBS | BODY MASS INDEX: 28.47 KG/M2

## 2025-05-21 DIAGNOSIS — I89.0 LYMPHEDEMA OF LEFT LOWER EXTREMITY: ICD-10-CM

## 2025-05-21 DIAGNOSIS — I25.10 CORONARY ARTERY DISEASE INVOLVING NATIVE CORONARY ARTERY OF NATIVE HEART WITHOUT ANGINA PECTORIS: ICD-10-CM

## 2025-05-21 DIAGNOSIS — R79.89 ELEVATED BRAIN NATRIURETIC PEPTIDE (BNP) LEVEL: ICD-10-CM

## 2025-05-21 DIAGNOSIS — I51.89 GRADE I DIASTOLIC DYSFUNCTION: ICD-10-CM

## 2025-05-21 DIAGNOSIS — Z86.79 STATUS POST ABLATION OF ATRIAL FIBRILLATION: ICD-10-CM

## 2025-05-21 DIAGNOSIS — I27.20 PULMONARY HYPERTENSION (HCC): ICD-10-CM

## 2025-05-21 DIAGNOSIS — N18.31 CKD STAGE G3A/A1, GFR 45-59 AND ALBUMIN CREATININE RATIO <30 MG/G: ICD-10-CM

## 2025-05-21 DIAGNOSIS — R06.09 DOE (DYSPNEA ON EXERTION): ICD-10-CM

## 2025-05-21 DIAGNOSIS — I35.1 MODERATE AORTIC INSUFFICIENCY: ICD-10-CM

## 2025-05-21 DIAGNOSIS — E78.00 PURE HYPERCHOLESTEROLEMIA: ICD-10-CM

## 2025-05-21 DIAGNOSIS — I71.43 INFRARENAL ABDOMINAL AORTIC ANEURYSM (AAA) WITHOUT RUPTURE (HCC): ICD-10-CM

## 2025-05-21 DIAGNOSIS — Z98.890 STATUS POST ABLATION OF ATRIAL FIBRILLATION: ICD-10-CM

## 2025-05-21 DIAGNOSIS — R60.0 BILATERAL LEG EDEMA: Primary | ICD-10-CM

## 2025-05-21 DIAGNOSIS — Z95.5 S/P CORONARY ARTERY STENT PLACEMENT: ICD-10-CM

## 2025-05-21 DIAGNOSIS — R60.0 BILATERAL LEG EDEMA: ICD-10-CM

## 2025-05-21 DIAGNOSIS — I25.2 HISTORY OF MI (MYOCARDIAL INFARCTION): ICD-10-CM

## 2025-05-21 PROCEDURE — 3078F DIAST BP <80 MM HG: CPT | Performed by: FAMILY MEDICINE

## 2025-05-21 PROCEDURE — 99212 OFFICE O/P EST SF 10 MIN: CPT

## 2025-05-21 PROCEDURE — 93971 EXTREMITY STUDY: CPT | Mod: LT

## 2025-05-21 PROCEDURE — G2211 COMPLEX E/M VISIT ADD ON: HCPCS | Performed by: FAMILY MEDICINE

## 2025-05-21 PROCEDURE — 3075F SYST BP GE 130 - 139MM HG: CPT | Performed by: FAMILY MEDICINE

## 2025-05-21 PROCEDURE — 99204 OFFICE O/P NEW MOD 45 MIN: CPT | Performed by: FAMILY MEDICINE

## 2025-05-21 ASSESSMENT — ENCOUNTER SYMPTOMS
WHEEZING: 0
FEVER: 0
CLAUDICATION: 0
HEMOPTYSIS: 0
COUGH: 0
CHILLS: 0
SHORTNESS OF BREATH: 0
ORTHOPNEA: 0
PALPITATIONS: 0
PND: 0
SPUTUM PRODUCTION: 0

## 2025-05-21 ASSESSMENT — FIBROSIS 4 INDEX: FIB4 SCORE: 1.72

## 2025-05-21 NOTE — Clinical Note
REFERRAL APPROVAL NOTICE         Sent on May 21, 2025                   Chantelle Sánchez  [address] [address]                   Dear Ms. Sánchez,    After a careful review of the medical information and benefit coverage, [facility] has processed your referral. See below for additional details.    If applicable, you must be actively enrolled with your insurance for coverage of the authorized service. If you have any questions regarding your coverage, please contact your insurance directly.    REFERRAL INFORMATION   Referral #:  [id number]  Referred-To Department    Referred-By Provider:  Physical Therapy    Sherman Quiroga M.D.   [facility] [address]      [address]  [place] [address] [phone] [address]  [address]  [place] [id number] [phone]    Referral Start Date:  05/21/2025  Referral End Date:   05/21/2026             SCHEDULING  If you do not already have an appointment, please call [phone] to make an appointment.     MORE INFORMATION  If you do not already have a [employer] account, sign up at: [email or web address]  You can access your medical information, make appointments, see lab results, billing information, and more.  If you have questions regarding this referral, please contact  the [facility] Referrals department at:             [phone]. Monday - Friday 8:00AM - 5:00PM.     Sincerely,    [facility]

## 2025-05-21 NOTE — PROGRESS NOTES
INITIAL VASCULAR MEDICINE CLINIC VISIT  05/21/25     Chantelle Sánchez is a 89 y.o. female referred for vascular medicine eval/mgmt of LE edema, varicose veins, est 5/2025  Referring provider: Tali López M.D.     Subjective    Barriers to care:  age, mild frailty, limited mobility due to L knee pain, increasing reliance on w/c for mobility, uses walker at home     EDEMA:  Initial visit hx: seen by PCP 3/2025 and noted to have L > RLE swelling.  Recent fall last week and has new bruising and  Has been seeing pain mgmt with gel injections.    Has had PT due to knee pain.  Has tried torsemide and not improving.     Initial hx of symptoms/pattern:  progressive worsening over 6mo, less mobility over that time.    Edema-causing conditions:    Increase hydrostatic pressure:   # pulm HTN - per prior echo 1/2024   # CVI with hx of venous ablations vs stripping in the past   # grade I diast dys, prior mild high NTproBNP - no mention of HFpEF in cardiology notes   Decreased oncotic pressure/hypoalbuminuria: None  Increased interstitial oncotic pressure (Hypothyroidism): none  Decreased lymphatic drainage (primary vs secondary): none   Increased capillary permeability: None   Meds possibly contributing to edema:  trazodone  Prior work-up:  2/2025 neg BLE venous duplex for DVT   Tx attempted to date:    Compression? Unable to use stockings, trying compression wraps    Diuretics? Torsemide - not effective    Other? Elevation does not     CAD, s/p PCI - stable, no sx, seeing  cardiology     AAA, stable, 3.0cm on most imaging w/o growth, seeing cardiology     HTN:  Stable, tolerating meds with good adherence, Home BP log: not checking     HLD: Stable, current treatment: High intensity statin - tolerating, good adherence     Dysglycemia: no    Antithrombotic tx: plavix - no hx of bleeding      Patient Active Problem List    Diagnosis Date Noted    Grade I diastolic dysfunction 05/21/2025    Moderate aortic insufficiency  05/21/2025    Primary insomnia 04/27/2025    Abnormal renal function test 04/27/2025    Pedal edema 04/18/2025    Aneurysm (HCC) 04/11/2024    Status post ablation of atrial fibrillation 04/11/2024    Aortic valve regurgitation 04/11/2024    Hyperlipidemia 04/11/2024    CAD (coronary artery disease) 01/22/2024    Primary osteoarthritis of both knees 10/12/2023    History of MI (myocardial infarction) 09/11/2023    S/P coronary artery stent placement 09/11/2023    Chronic bilateral low back pain without sciatica 09/11/2023    Chronic pain of both knees 09/11/2023    Chronic cough 09/11/2023    Seasonal allergies 09/11/2023    Macular degeneration of right eye 09/11/2023      Past Surgical History[1]   Family History   Problem Relation Age of Onset    Heart Disease Son     Hyperlipidemia Son     Hyperlipidemia Daughter       Medications Ordered Prior to Encounter[2]   Allergies[3]     Social History[4]  DIET AND EXERCISE:  Weight Change: stable   Diet: common adult  Exercise: limited mobility due to L knee pain, swelling      Review of Systems   Constitutional:  Negative for chills, fever and malaise/fatigue.   Respiratory:  Negative for cough, hemoptysis, sputum production, shortness of breath and wheezing.    Cardiovascular:  Positive for leg swelling. Negative for chest pain, palpitations, orthopnea, claudication and PND.          Objective    Vitals:    05/21/25 1101 05/21/25 1105   BP: (!) 146/76 135/77   BP Location: Left arm Left arm   Patient Position: Sitting Sitting   BP Cuff Size: Adult Adult   Pulse: 75 64   Weight: 65.8 kg (145 lb)    Height: 1.524 m (5')       BP Readings from Last 4 Encounters:   05/21/25 135/77   04/18/25 126/60   03/31/25 120/66   02/25/25 130/80      Body mass index is 28.32 kg/m².   Wt Readings from Last 4 Encounters:   05/21/25 65.8 kg (145 lb)   04/18/25 65.3 kg (144 lb)   03/31/25 63.7 kg (140 lb 6.4 oz)   02/25/25 65.8 kg (145 lb)      Physical Exam  Constitutional:        "General: She is not in acute distress.     Appearance: Normal appearance. She is not diaphoretic.   HENT:      Head: Normocephalic and atraumatic.   Eyes:      Conjunctiva/sclera: Conjunctivae normal.   Cardiovascular:      Rate and Rhythm: Normal rate and regular rhythm.      Pulses:           Carotid pulses are 2+ on the right side and 2+ on the left side.       Dorsalis pedis pulses are 2+ on the right side and 2+ on the left side.        Posterior tibial pulses are 2+ on the right side and 2+ on the left side.      Heart sounds: Normal heart sounds.      Comments: Stemmer's negative bilat   No abd wall varicosities     Spider telangectasia:       RLE:  None      LLE: none   Varicosities:           RLE: none      LLE: none   Corona phlebectatica:      RLE:  None        LLE:  None   Cording:         RLE:  None     LLE: None       Pulmonary:      Effort: Pulmonary effort is normal.      Breath sounds: Normal breath sounds.   Musculoskeletal:      Right lower leg: No edema.      Left lower leg: No edema.        Legs:    Lymphadenopathy:      Lower Body: No left inguinal adenopathy.   Skin:     General: Skin is warm and dry.      Findings: No rash or wound.          Neurological:      Mental Status: She is alert and oriented to person, place, and time.      Cranial Nerves: No cranial nerve deficit.      Gait: Gait is intact.   Psychiatric:         Mood and Affect: Mood and affect normal.          DATA REVIEW    Lab Results   Component Value Date/Time    CHOLSTRLTOT 160 10/04/2024 08:07 AM    LDL 93 10/04/2024 08:07 AM    HDL 45 10/04/2024 08:07 AM    TRIGLYCERIDE 109 10/04/2024 08:07 AM       Lab Results   Component Value Date/Time    LDL 93 10/04/2024 08:07 AM       No results found for: \"LIPOPROTA\"   No results found for: \"APOB\"   No results found for: \"CRPHIGHSEN\"      Lab Results   Component Value Date/Time    SODIUM 137 04/03/2025 12:20 PM    POTASSIUM 3.8 04/03/2025 12:20 PM    CHLORIDE 97 04/03/2025 12:20 PM " "   CO2 26 04/03/2025 12:20 PM    GLUCOSE 106 (H) 04/03/2025 12:20 PM    BUN 42 (H) 04/03/2025 12:20 PM    CREATININE 0.96 04/03/2025 12:20 PM     Lab Results   Component Value Date/Time    ALKPHOSPHAT 101 (H) 01/22/2024 07:15 PM    ASTSGOT 31 01/22/2024 07:15 PM    ALTSGPT 29 01/22/2024 07:15 PM    TBILIRUBIN 0.3 01/22/2024 07:15 PM       No results found for: \"HBA1C\"    No results found for: \"MICROALBCALC\", \"MALBCRT\", \"MALBEXCR\", \"GNIGAN67\", \"MICROALBUR\", \"MICRALB\", \"UMICROALBUM\", \"MICROALBTIM\"      IMAGING:    TTE 1/23/24  Normal right and left ventricular size and function.   The ejection fraction is measured to be 72% by Archibald's biplane.  Grade I diastolic dysfunction.  Aortic valve sclerosis without significant stenosis.  Moderate aortic insufficiency.  Calcified aortic valve leaflets with what appears to be some mobile   components, consider endocarditis if clinically indicated.  Moderate tricuspid regurgitation.  Normal right atrial pressure.   Mild pulmonary hypertension, PASP of 39mmHg.   No prior study is available for comparison.      5/24/2024  [facility]   Conclusion:   Rare PACs and PVCs   Average HR 73bpm   No concerning arrhythmias   No patient events      Aota/iliac ultrasound 7/2024   An abdominal mid aortic aneurysm is noted with a maximum diameter of 3.01 x    3.01 cm.    No stenosis of the proximal segment of the major visceral arteries.    Waveforms and velocities of the abdominal aorta and bilateral iliac arteries    are normal with no hemodynamically significant stenosis.    No prior study is available for comparison.     2/2025 BLE venous   No evidence of bilateral lower extremity deep venous thrombosis.   5.7 x 1.8 x 3.6 cm right Baker's cyst      PROCEDURES:     LHC, PCI     S/p EP ablation for AF          Medical Decision Making:  Today's Assessment / Status / Plan:     1. Bilateral leg edema  US-EXTREMITY VENOUS LOWER UNILAT LEFT    CT-PELVIS WITH    Basic Metabolic Panel    proBrain " "Natriuretic Peptide, NT      2. Infrarenal abdominal aortic aneurysm (AAA) without rupture (HCC)        3. Pulmonary hypertension (HCC)        4. CKD stage G3a/A1, GFR 45-59 and albumin creatinine ratio <30 mg/g  Basic Metabolic Panel    CBC WITH DIFFERENTIAL      5. Elevated brain natriuretic peptide (BNP) level        6. Coronary artery disease involving native coronary artery of native heart without angina pectoris  CBC WITH DIFFERENTIAL      7. History of MI (myocardial infarction)        8. S/P coronary artery stent placement        9. Pure hypercholesterolemia        10. Status post ablation of atrial fibrillation        11. Moderate aortic insufficiency        12. Grade I diastolic dysfunction        13. RANDALL (dyspnea on exertion)  proBrain Natriuretic Peptide, NT      14. 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\"), s/p venous ablation in the past   - prior w/u for DVT neg 2/2025, but due to progression and recurrent injections of gel would suggest repeat  - unclear if gel extravasation could obstruct lymph fluid outflow   - possible that progressive venous HTN now leading to progressive unilat lymph system stenosis, clearly has worsen CVI stigmata on L > R, suggesting higher venous HTN  - concern for lymph system outflow from other extrinsic masses such as pelvic mass with higher risk of malignancy based upon age and unilat, progressive lymphedema, so will need CT for further soft tissue and obstructive mass evaluation   - no signs of overt HF as would likely be BLE edema   - no indications of baseline hep, renal, thyroid dz   LLE CEAP classification: C4aS / Ep / Ap / Pr   RLE CEAP classification: C3A / Ep / Ap / Pr   Plan:  - check STAT LLE venous duplex   - check CT pelvic with (BMP prior, has G3a renal function) for extrinsic masses blocking outflow " "  - update BMP, NTproBNP, CBC with diff  - start foot/LLE leg compression wraps - get at [employer]   - urgent ref to lymphedema clinic for MLD eval and possible pumps   - active calf pumping exercises if prolonged standing and elevate legs above heart level while sitting and sleeping   - reduce sodium (\"salt\") in diet to less than 2,000mg daily   - continue daily moisturizing lotion (such as [employer] Diabetic Foot Cream)  Medications:    - in general, diuretics will not help with CVI-assoc edema and should be avoided   - preferred evidence-based oral agents per 2023 SVS guidelines include MPFF (Rx Vasculera or OTC diosmin/hespiridin), Ruscus extract ('s broom extract)  - other available agents include horse chestnut seed extract   - may consider OTC MPFF (brand with evidence-based dosing regimen is Doctor's Best Vein Support)in future as can help CVI and lymphedema    2) CAD, s/p MI, s/p PIC - stable, no sx, continue ongoing care with cardiology     3) hx of AF, s/p ablation - stable , ongoing care with cardiology     4) valvular HD (moderate AI and TR per echo) - no indications of fluid overload syncope, ongoing care with cardiology     5) mild PH, grade 1 diast dys - no overt RANDALL or fluid overload   - has not responded to loop so unlikely cause of edema  - ongoing care with cardiology     BLOOD PRESSURE CONTROL   Office BP goal per ACC/AHA <130/80  Home BP at goal:  yes  Office BP at goal:  no mild RAFA   24h ABPM:  not ordered to date   RDN candidate? NO   Plan:   - start/continue home BP monitoring, reviewed correct technique, provide BP log and instructions  - order 24h ABPM:  NO  - routine monitoring of lytes/gfr   Medications:  - increase torsemid to 20mg qam with Kcl (can benefit BP, pulm HTN but unlikely beneficial for phlebolymphedema)   - continue metoprolol 50mg ER daily (hx MI, AF)    LIPID MANAGEMENT  Qualifies for Statin Therapy per ACC/AHA Guidelines: yes, Secondary Prevention - " Very high risk ASCVD - 2 major events or 1 event with other high-risk conditions  Major ASCVD events: History of prior MI >12 months ago     High-risk conditions: >64yr old , Hypertension , and CKD (egfr 15-59)  Lipoprotein(a): Not available  Currently on Statin: Yes  Tx goals: LDL-C <55  though is prognosis related at this time due to age   At goal? No, 10/2024   Plan:   - continue atorva 80mg - deprescribe based upon px and life-expectancy over time      GLYCEMIC MANAGEMENT Normal    ANTITHROMBOTIC THERAPY: Yes , continue plavix 75mg daily as per cardiology     LIFESTYLE INTERVENTIONS  TOBACCO:    reports that she has never smoked. She has never used smokeless tobacco.   - continued complete avoidance of all tobacco products     PHYSICAL ACTIVITY: >150min/week of mod-intensity activity or as much as tolerated    NUTRITION: Mediterranean and reduce Na to <1,500mg/day     ETOH: does not drink    WT MGMT: maintain current healthy wt     OTHER:  none     STUDIES:  STAT - LLE venous     CT pelvis with   FOLLOW-UP: 6 weeks,  urgent ref to lymphedema clinic     Time: 47min - chart review/prep, review of other providers' records, imaging/lab review, face-to-face time for history/examination, ordering, prescribing,  review of results/meds/ treatment plan with patient/family/caregiver, documentation in EMR, care coordination     Sherman Quiroga M.D.   [facility]  [facility]  [phone]            [1]   Past Surgical History:  Procedure Laterality Date    MD NEUROLYTIC DEST GENICULAR NERVE Left 02/05/2025    Procedure: LEFT genicular nerve neurotomy with sedation….Note: Patient needs clearance to be off of Plavix for 5 days prior to the procedure.  Plan on the left knee approximately 2 weeks before the right knee.  Plan on sedation with 25mcg fentanyl.;  Surgeon: Enrique Louise M.D.;  [facility];  Service: Pain Management    MD INJ  AA/STRD GNCLR NRV BRNCH W/IMG Bilateral 01/14/2025    Procedure: bilateral knee genicular nerve blocks with fluoroscopic guidance;  Surgeon: Enrique Louise M.D.;  [facility];  Service: Pain Management    ID INJ AA/STRD GNCLR NRV BRNCH W/IMG Left 11/06/2024    Procedure: LEFT knee genicular nerve blocks with fluoroscopic guidance….Note: OK to continue plavix. DEA guidelines reviewed. Left knee approximately 1 week before the right.;  Surgeon: Enrique Louise M.D.;  [facility];  Service: Pain Management    ID INJ LUMBAR/SACRAL,W/ IMAGING N/A 04/23/2024    Procedure: Lumbar L5-S1 interlaminar epidural steroid injection…..Note: Patient needs clearance to be off of Plavix for 5 days prior to the procedure;  Surgeon: Enrique Louise M.D.;  [facility];  Service: Pain Management    CARPAL TUNNEL RELEASE      CATARACT EXTRACTION WITH IOL      KNEE ARTHROSCOPY      ORIF, WRIST  1970    broken    OTHER ABDOMINAL SURGERY  1985    OTHER CARDIAC SURGERY  06/2020    OTHER ORTHOPEDIC SURGERY      toe    RHINOPLASTY     [2]   Current Outpatient Medications on File Prior to Visit   Medication Sig Dispense Refill    torsemide (DEMADEX) 10 MG tablet Take 1 Tablet by mouth every day. 90 Tablet 0    traZODone (DESYREL) 50 MG Tab Take 1 Tablet by mouth every evening. 90 Tablet 1    clopidogrel (PLAVIX) 75 MG Tab Take 1 Tablet by mouth every day. 90 Tablet 1    potassium chloride ER (KLOR-CON) 10 MEQ tablet Take 1 Tablet by mouth every day. 180 Tablet 0    lansoprazole (PREVACID) 30 MG CAPSULE DELAYED RELEASE TAKE 1 CAPSULE BY MOUTH EVERY DAY 90 Capsule 2    Dextromethorphan-guaiFENesin (MUCINEX DM)  MG TABLET SR 12 HR Take 1 Each by mouth 3 times a day as needed (cough). 30 Tablet 0    ipratropium-albuterol (DUONEB) 0.5-2.5 (3) MG/3ML nebulizer solution Take 3 mL by nebulization every 6 hours as needed for Shortness of Breath (wheezing). 30  Each 0    albuterol 108 (90 Base) MCG/ACT Aero Soln inhalation aerosol Inhale 2 Puffs every four hours as needed for Shortness of Breath. 1 Each 1    fluticasone (FLONASE) 50 MCG/ACT nasal spray Administer 1 Spray into affected nostril(S) every day.      cetirizine (ZYRTEC) 10 MG Tab Take 10 mg by mouth every day.      metoprolol SR (TOPROL XL) 50 MG TABLET SR 24 HR Take 50 mg by mouth every day.      atorvastatin (LIPITOR) 80 MG tablet Take 80 mg by mouth every evening.       No current facility-administered medications on file prior to visit.   [3] No Known Allergies  [4]   Social History  Tobacco Use    Smoking status: Never    Smokeless tobacco: Never   Vaping Use    Vaping status: Never Used   Substance Use Topics    Alcohol use: Not Currently    Drug use: Not Currently

## 2025-05-21 NOTE — TELEPHONE ENCOUNTER
Spoke with pt's [email or web address], and shared prelim venous duplex results as negative for DVT or other abnl findings.

## 2025-05-28 ENCOUNTER — PHYSICAL THERAPY (OUTPATIENT)
Dept: PHYSICAL THERAPY | Facility: REHABILITATION | Age: OVER 89
End: 2025-05-28
Attending: FAMILY MEDICINE
Payer: MEDICARE

## 2025-05-28 DIAGNOSIS — R60.9 LIPEDEMA: ICD-10-CM

## 2025-05-28 DIAGNOSIS — I89.0 LYMPHEDEMA OF LEFT LOWER EXTREMITY: Primary | ICD-10-CM

## 2025-05-28 PROCEDURE — 97163 PT EVAL HIGH COMPLEX 45 MIN: CPT

## 2025-05-28 NOTE — OP THERAPY EVALUATION
Outpatient Physical Therapy  LYMPHEDEMA THERAPY INITIAL EVALUATION    [facility]  [address]  [address]  [facility] [address]  Phone:  [phone]  Fax:  [phone]    Date of Evaluation: 05/28/2025    Patient: Chantelle Sánchez  YOB: 1927  MRN: [id number]     Referring Provider: Sherman Quiroga M.D.  [address] [place] [address]   Referring Diagnosis Lymphedema of left lower extremity [I89.0]     Time Calculation    Start time: 0901  Stop time: 0947 Time Calculation (min): 46 minutes             Chief Complaint: Lipolymphedema and Leg Swelling    Visit Diagnoses     ICD-10-CM   1. Lymphedema of left lower extremity  I89.0   2. Lipedema  R60.9       Subjective:   History of Present Illness:     Mechanism of injury:  Pt's daughter reports she has been having issues with her L knee. It is bone on bone. Had a nerve ablation too and then she noted that her L LE swelling became worse. It is mostly on the L side. Swelling does not reduce overnight. Tried Lasix but this did not seem to help the L leg. Elevation does not seem to help either. She reports she fell last week trying to pick something up of the floor. She has had gel to her knee joint and that previously helped but not really anymore. Also underwent HH therapy which was helpful for a while. R side swells also but not as much as the left.       Past Medical History[1]  Past Surgical History[2]  Social History     Tobacco Use    Smoking status: Never    Smokeless tobacco: Never   Substance Use Topics    Alcohol use: Not Currently     Social Hx - Family and Occupational[3]    Lymphedema Objective    Visit type  Lipolymphedema      Skin Appearance    soft, moderate edema, pitting edema, Slender waist, excess fat tissue to bilateral proximal UE and LE. Hands/feet without fat tissue deposits. .  Left Lower Extremity Circumferential Measurements  Waist: cm  Hip: cm  Scrotum: cm  Ground/Upper Thigh: cm  Mid  Thigh: cm  Knee: 41.2 cm  Upper Calf: 41 cm  Mid Calf: 28.6 cm  Ankle: 22.4 cm  Heel to Foot: 22.5 cm  Total: 155.7 cm            Therapeutic Treatments and Modalities:     Therapeutic Treatment and Modalities Summary: Pt has been educated on the pathogenesis of lipedema (fluid in fat) including the following: lipedema is a symmetrical, painful fat disorder affecting subcutaneous adipose tissue mostly in the hips, buttocks, and legs. Often, this fat accumulation does not respond to traditional weight loss strategies of diet and exercise. While the cause is not completely understood, current thinking is that hormonal changes impact the quality of blood vessels and lymphatics such that they become leaky. It is therefore common to feel as though the limb is painful and heavy due to the accumulation of fat and fluid. In addition, sometimes limbs can easily bruise. Informational brochure distributed.      Educated patient on pathogenesis of lymphedema. Patient has also been educated on skin care precautions including hygiene, lotions with pH 5-5.5, and avoidance of lacerations/mosquito bites/heat. Also educated pt on signs/symptoms of cellulitis.        Discussed the need for external compression and educated pt regarding compression garment options.  Likely, pt will be best suited for velcro compression. Pt's daughter reports she has ordered a Sigvaris Comprefit calf piece. It has not arrived yet but may be a reasonable option for reduction and control. Patient verbalized understanding to all education today.     Pt could be an excellent candidate for a pneumatic compression device to assist with controlling her lymphedema. Conservative therapy will begin today and consist of exercise, elevation, and compression of at least 20-30mmHg. Measurements are listed above.     Time-based treatments/modalities:           Assessment and Plan:   Functional Impairments: lacks appropriate home exercise program and swelling     Assessment details:  Chantelle is a 89yo F who reports having struggled with L knee pain with multiple interventions that has subsequently resulted in swelling to her L LE distally. Upon evaluation, she is noted to demonstrate a slender waist with fat tissue deposit to her hips, thighs, and proximal calves, as well as her B UE, sparing her hands. Do suspect underlying symptoms of lipedema as Chantelle also reports previous interventions for venous insufficiency. She is demonstrating pitting edema to her L LE, but given her level of mobility and challenges with arthritis at her hands, it is unlikely she will be able to tolerate Complete Decongestive Therapy with multilayer compression bandaging. Discussed alternatively utilizing velcro compression to achieve reduction as well as containment. Daughter has already ordered a garment and is awaiting arrival. Chantelle will benefit from skilled intervention to decongest her L LE and control future swelling.   Prognosis: good      Goals:   Short Term Goals:  1. Chantelle will obtain and wear her Velcro compression daily to reduce her L LE by 5cm.   2. Chantelle will perform targeted seated LE exercises while wearing compression to improve fluid movement and muscle pump to her L LE.     Short term goal time span:  2-4 weeks    Long Term Goals:  1. Pt will have a reduction in total limb circumference of 8cm in order to decrease risk for infection and progression of disease process.   2. Patient will be independent with maintenance phase management of lymphedema including: compression garments, skin care education, risk reduction strategies, manual lymphatic drainage, and therapeutic exercise.   Long term goal time span:  4-6 weeks    Plan:  Therapy options:  Physical therapy treatment to continue  Planned therapy interventions:  Caregiver education, decongestive exercises, home exercise program, intermittent compression, manual lymph drainage, Velcro wraps, strengthening  exercises, soft tissue manual techniques (CPT 35761), skin/wound care, sequential compression pump, self-care/training (CPT 34161), referral for compression garment & instructions for don/doffing, range of motion exercises, postural exercises, patient education, orthotic measurements/fitting and myofascial release techniques  Planned education:  Functional anatomy and physiology of the lymphatic system, pathophysiology of lymphedema, lymphedema exercise, lymphedema precautions, proper skin care/nutrition, compression bandaging, self massage, infection prevention, scar tissue management, activity guidelines, dietary guidelines, skin care guidelines, home pump use, bandage removal and long term self-management of lymphedema  Frequency:  1x week  Duration in weeks:  8  Discussed with:  Patient and family      Functional Assessment Used    LLIS    Referring provider co-signature:  I have reviewed this plan of care and my co-signature certifies the need for services.    Certification Period: 05/28/2025 to  07/24/25    Physician Signature: ________________________________ Date: ______________               [1]   Past Medical History:  Diagnosis Date    Arthritis     Congestive heart failure (HCC)     Coronary artery disease     Heart disease     High cholesterol     History of heart artery stent     History of heart attack     Hyperlipidemia     Hypertension     Myocardial infarct (HCC) 2020    Osteoarthritis     (B)    Pain knees    Spinal disorder     Stomach ulcer     Syncope and collapse 01/22/2024   [2]   Past Surgical History:  Procedure Laterality Date    GA NEUROLYTIC DEST GENICULAR NERVE Left 02/05/2025    Procedure: LEFT genicular nerve neurotomy with sedation….Note: Patient needs clearance to be off of Plavix for 5 days prior to the procedure.  Plan on the left knee approximately 2 weeks before the right knee.  Plan on sedation with 25mcg fentanyl.;  Surgeon: Enrique Louise M.D.;  [facility];  Service: Pain Management    MD INJ AA/STRD GNCLR NRV BRNCH W/IMG Bilateral 01/14/2025    Procedure: bilateral knee genicular nerve blocks with fluoroscopic guidance;  Surgeon: Enrique Louise M.D.;  [facility];  Service: Pain Management    MD INJ AA/STRD GNCLR NRV BRNCH W/IMG Left 11/06/2024    Procedure: LEFT knee genicular nerve blocks with fluoroscopic guidance….Note: OK to continue plavix. DEA guidelines reviewed. Left knee approximately 1 week before the right.;  Surgeon: Enrique Louise M.D.;  [facility];  Service: Pain Management    MD INJ LUMBAR/SACRAL,W/ IMAGING N/A 04/23/2024    Procedure: Lumbar L5-S1 interlaminar epidural steroid injection…..Note: Patient needs clearance to be off of Plavix for 5 days prior to the procedure;  Surgeon: Enrique Louise M.D.;  [facility];  Service: Pain Management    CARPAL TUNNEL RELEASE      CATARACT EXTRACTION WITH IOL      KNEE ARTHROSCOPY      ORIF, WRIST  1970    broken    OTHER ABDOMINAL SURGERY  1985    OTHER CARDIAC SURGERY  06/2020    OTHER ORTHOPEDIC SURGERY      toe    RHINOPLASTY     [3]   Family and Occupational History  Socioeconomic History    Marital status:      Spouse name: Not on file    Number of children: Not on file    Years of education: Not on file    Highest education level: 12th grade   Occupational History    Not on file

## 2025-05-30 ENCOUNTER — HOSPITAL ENCOUNTER (OUTPATIENT)
Facility: MEDICAL CENTER | Age: OVER 89
End: 2025-05-30
Attending: FAMILY MEDICINE
Payer: MEDICARE

## 2025-05-30 DIAGNOSIS — R60.0 BILATERAL LEG EDEMA: ICD-10-CM

## 2025-05-30 DIAGNOSIS — R06.09 DOE (DYSPNEA ON EXERTION): ICD-10-CM

## 2025-05-30 DIAGNOSIS — N18.31 CKD STAGE G3A/A1, GFR 45-59 AND ALBUMIN CREATININE RATIO <30 MG/G: ICD-10-CM

## 2025-05-30 LAB
ANION GAP SERPL CALC-SCNC: 13 MMOL/L (ref 7–16)
BUN SERPL-MCNC: 23 MG/DL (ref 8–22)
CALCIUM SERPL-MCNC: 9.2 MG/DL (ref 8.5–10.5)
CHLORIDE SERPL-SCNC: 103 MMOL/L (ref 96–112)
CO2 SERPL-SCNC: 24 MMOL/L (ref 20–33)
CREAT SERPL-MCNC: 0.76 MG/DL (ref 0.5–1.4)
GFR SERPLBLD CREATININE-BSD FMLA CKD-EPI: 71 ML/MIN/1.73 M 2
GLUCOSE SERPL-MCNC: 66 MG/DL (ref 65–99)
NT-PROBNP SERPL IA-MCNC: 567 PG/ML (ref 0–125)
POTASSIUM SERPL-SCNC: 3.9 MMOL/L (ref 3.6–5.5)
SODIUM SERPL-SCNC: 140 MMOL/L (ref 135–145)

## 2025-05-30 PROCEDURE — 83880 ASSAY OF NATRIURETIC PEPTIDE: CPT

## 2025-05-30 PROCEDURE — 80048 BASIC METABOLIC PNL TOTAL CA: CPT

## 2025-06-03 ENCOUNTER — APPOINTMENT (OUTPATIENT)
Dept: PHYSICAL THERAPY | Facility: REHABILITATION | Age: OVER 89
End: 2025-06-03
Attending: FAMILY MEDICINE
Payer: MEDICARE

## 2025-06-04 ENCOUNTER — PHYSICAL THERAPY (OUTPATIENT)
Dept: PHYSICAL THERAPY | Facility: REHABILITATION | Age: OVER 89
End: 2025-06-04
Attending: FAMILY MEDICINE
Payer: MEDICARE

## 2025-06-04 DIAGNOSIS — I89.0 LYMPHEDEMA OF LEFT LOWER EXTREMITY: Primary | ICD-10-CM

## 2025-06-04 DIAGNOSIS — R60.9 LIPEDEMA: ICD-10-CM

## 2025-06-04 PROCEDURE — 97140 MANUAL THERAPY 1/> REGIONS: CPT

## 2025-06-04 PROCEDURE — 97535 SELF CARE MNGMENT TRAINING: CPT

## 2025-06-04 NOTE — OP THERAPY DAILY TREATMENT
Outpatient Physical Therapy  LYMPHEDEMA THERAPY DAILY TREATMENT     [facility] [address]  [address]  [place] [address]  Phone:  [phone]  Fax:  [phone]    Date: 06/04/2025    Patient: Chantelle Sánchez  YOB: 1927  MRN: [id number]     Time Calculation    Start time: 1502  Stop time: 1548 Time Calculation (min): 46 minutes         Chief Complaint: Lipolymphedema    Visit #: 2    Subjective:   History of Present Illness:     Mechanism of injury:  L knee really painful to her today. Both distal LE seem more swollen to her as well. Very uncomfortable.       Lymphedema Objective    Left Lower Extremity Circumferential Measurements 5/28 EVAL  Waist: cm  Hip: cm  Scrotum: cm  Ground/Upper Thigh: cm  Mid Thigh: cm  Knee: 41.2 cm  Upper Calf: 41 cm  Mid Calf: 28.6 cm  Ankle: 22.4 cm  Heel to Foot: 22.5 cm  Total: 155.7 cm    Therapeutic Treatments and Modalities:     1. Manual Therapy (CPT 95614)    2. Self Care ADL Training (CPT 69150)    Therapeutic Treatment and Modalities Summary:   Manual:  -trigger point release to B hip adductors, quads, gastroc/soleus  -edema and fibrotic techniques to proximal tibia bilaterally.    -MLD to inguinal nodes on R and L LE followed by popliteal and surface MLD to all R LE  The purpose of Manual Lymph Drainage (MLD) is to reduce lymph volume in the affected limb by increasing intake of lymphatic load into the lymphatic system, increasing the volume of transported lymph fluid, moving lymph fluid in superficial lymph vessels to collateral lymph collectors, anastomoses, or tissue channels, and increasing venous return. The goal of MLD is to re-route the lymph flow around blocked areas into more centrally located healthy lymph vessels, which drain into the venous system.       Functional Training  -unfortunately, pt's velcro has been backordered and daughter reports new garment is on its way  -discussed utilizing single layer of  short stretch over tubular bandage today to provide light compressive relief and reduce risk of worsening swelling. Instructed daughter on application, 50% stretch/50% overlap of single short stretch bandage.     Time-based treatments/modalities:    Physical Therapy Timed Treatment Charges  Functional training, self care minutes (CPT 36946): 20 minutes  Manual therapy minutes (CPT 15287): 26 minutes      Assessment and Plan:   Assessment details:  Chantelle is having difficulty with the accumulation of swelling to her distal B LE. She will benefit from intervention to assist with reduction and control of the swelling to improve her ability to walk.     Plan:  Therapy options:  Physical therapy treatment to continue  Discussed with:  Patient and family

## 2025-06-09 ENCOUNTER — HOSPITAL ENCOUNTER (OUTPATIENT)
Dept: RADIOLOGY | Facility: MEDICAL CENTER | Age: OVER 89
End: 2025-06-09
Attending: FAMILY MEDICINE
Payer: MEDICARE

## 2025-06-09 DIAGNOSIS — R60.0 BILATERAL LEG EDEMA: ICD-10-CM

## 2025-06-09 PROCEDURE — 72193 CT PELVIS W/DYE: CPT

## 2025-06-09 PROCEDURE — 700117 HCHG RX CONTRAST REV CODE 255: Performed by: FAMILY MEDICINE

## 2025-06-09 RX ADMIN — IOHEXOL 100 ML: 350 INJECTION, SOLUTION INTRAVENOUS at 13:06

## 2025-06-10 ENCOUNTER — DOCUMENTATION (OUTPATIENT)
Dept: VASCULAR LAB | Facility: MEDICAL CENTER | Age: OVER 89
End: 2025-06-10
Payer: MEDICARE

## 2025-06-10 PROBLEM — I71.43 INFRARENAL ABDOMINAL AORTIC ANEURYSM (AAA) WITHOUT RUPTURE (HCC): Status: ACTIVE | Noted: 2025-06-10

## 2025-06-10 NOTE — PROGRESS NOTES
CT pelvis with normal iliacs and no signs of extrinsic masses impeding venous return.   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  This is small size.  Based upon age, unlikely to progress to >5.0cm, however may still be prudent to plan for a repeat ao/ilaic duplex in 2yrs based upon pt preference (6/2027).      Pending f/u to review

## 2025-06-16 DIAGNOSIS — I47.10 SVT (SUPRAVENTRICULAR TACHYCARDIA) (HCC): ICD-10-CM

## 2025-06-16 DIAGNOSIS — E78.5 HYPERLIPIDEMIA, UNSPECIFIED HYPERLIPIDEMIA TYPE: Primary | ICD-10-CM

## 2025-06-16 DIAGNOSIS — I47.20 VENTRICULAR TACHYCARDIA (HCC): ICD-10-CM

## 2025-06-16 DIAGNOSIS — I49.3 PVC'S (PREMATURE VENTRICULAR CONTRACTIONS): ICD-10-CM

## 2025-06-16 DIAGNOSIS — Z95.5 S/P CORONARY ARTERY STENT PLACEMENT: ICD-10-CM

## 2025-06-16 NOTE — TELEPHONE ENCOUNTER
VR    Received request via: Patient    Was the patient seen in the last year in this department? Yes    Does the patient have an active prescription (recently filled or refills available) for medication(s) requested? Yes    [facility]      Does the patient have [facility] and need 100-day supply? (This applies to ALL medications) Patient does not have SCP

## 2025-06-17 ENCOUNTER — PHYSICAL THERAPY (OUTPATIENT)
Dept: PHYSICAL THERAPY | Facility: REHABILITATION | Age: OVER 89
End: 2025-06-17
Attending: FAMILY MEDICINE
Payer: MEDICARE

## 2025-06-17 DIAGNOSIS — R60.9 LIPEDEMA: ICD-10-CM

## 2025-06-17 DIAGNOSIS — I89.0 LYMPHEDEMA OF LEFT LOWER EXTREMITY: Primary | ICD-10-CM

## 2025-06-17 PROCEDURE — 97530 THERAPEUTIC ACTIVITIES: CPT

## 2025-06-17 PROCEDURE — 97535 SELF CARE MNGMENT TRAINING: CPT

## 2025-06-17 NOTE — OP THERAPY DAILY TREATMENT
Outpatient Physical Therapy  LYMPHEDEMA THERAPY DAILY TREATMENT     [facility] [address]  [address]  [place] [address]  Phone:  [phone]  Fax:  [phone]    Date: 06/17/2025    Patient: Chantelle Sánchez  YOB: 1927  MRN: [id number]     Time Calculation    Start time: 0901  Stop time: 0945 Time Calculation (min): 44 minutes         Chief Complaint: Lipolymphedema    Visit #: 3    Subjective:   History of Present Illness:     Mechanism of injury:  Received her Velcro compression. Has been wearing it at times. Unsure timing.       Lymphedema Objective    Left Lower Extremity Circumferential Measurements  Waist: cm  Hip: cm  Scrotum: cm  Ground/Upper Thigh: cm  Mid Thigh: cm  Knee: 42.6 cm  Upper Calf: 40.6 cm  Mid Calf: 28.2 cm  Ankle: 22.2 cm  Heel to Foot: 21.9 cm  Total: 155.5 cm          Left Lower Extremity Circumferential Measurements EVAL  Waist: cm  Hip: cm  Scrotum: cm  Ground/Upper Thigh: cm  Mid Thigh: cm  Knee: 41.2 cm  Upper Calf: 41 cm  Mid Calf: 28.6 cm  Ankle: 22.4 cm  Heel to Foot: 22.5 cm  Total: 155.7 cm    Therapeutic Treatments and Modalities:     1. Therapeutic Activities (CPT 57533)    2. Self Care ADL Training (CPT 18054)    Therapeutic Treatment and Modalities Summary:   Functional Training  -discussed compression options; pt has been wearing compression to her L calf in the form of a velcro garment her daughter purchased for her. Did not bring today  -discussed additional compression options including a night garment which she may find to be more comfortable as well as break up some of the more firm tissue to her L LE.     Therapeutic Activity  -cut Komprex II foam for pt's B LE to assist with compression and softening of her distal LE  -instructed pt and daughter on application beneath velcro and also beneath 8cm short stretch.     Time-based treatments/modalities:    Physical Therapy Timed Treatment Charges  Functional training,  self care minutes (CPT 51472): 28 minutes  Therapeutic activity minutes (CPT 38980): 16 minutes      Assessment and Plan:   Assessment details:  Chantelle has been struggling with finding the most comfortable method of compression. Her mobility has suffered due to significant L knee pain after her fall a month or two ago, however, her swelling is also likely contributing to challenges with mobility. She will benefit from further intervention to control and reduce her B LE.

## 2025-06-17 NOTE — TELEPHONE ENCOUNTER
Is the patient due for a refill? Yes    Was the patient seen the last 15 months? Yes    Date of last office visit: 10/16/2024    Does the patient have an upcoming appointment?  No     Provider to refill: VR    Does the patient have [facility] and need 100-day supply? (This applies to ALL medications) Patient does not have SCP

## 2025-06-19 RX ORDER — ATORVASTATIN CALCIUM 80 MG/1
80 TABLET, FILM COATED ORAL NIGHTLY
Qty: 90 TABLET | Refills: 1 | Status: SHIPPED | OUTPATIENT
Start: 2025-06-19

## 2025-06-19 RX ORDER — METOPROLOL SUCCINATE 50 MG/1
50 TABLET, EXTENDED RELEASE ORAL
Qty: 90 TABLET | Refills: 1 | Status: SHIPPED | OUTPATIENT
Start: 2025-06-19

## 2025-06-23 ENCOUNTER — APPOINTMENT (OUTPATIENT)
Dept: PHYSICAL THERAPY | Facility: REHABILITATION | Age: OVER 89
End: 2025-06-23
Attending: FAMILY MEDICINE
Payer: MEDICARE

## 2025-06-23 ENCOUNTER — DOCUMENTATION (OUTPATIENT)
Dept: VASCULAR LAB | Facility: MEDICAL CENTER | Age: OVER 89
End: 2025-06-23
Payer: MEDICARE

## 2025-06-23 NOTE — PROGRESS NOTES
Established patient  Chart prep for upcoming appointment.    Any pending/incomplete orders from last visit? No, all orders completed.  Was patient called and reminded to complete pending orders? N/A orders complete  Were any records requested?  No    Referral up to date? Yes  Referral attached to appointment (renewals and New patients only)? N/A (established with up-to-date referral)  Virtual appointment? No            Marlene Carr, Medical Assistant   [facility]   Ph: [phone]  Fx: [phone]

## 2025-06-26 ENCOUNTER — PHYSICAL THERAPY (OUTPATIENT)
Dept: PHYSICAL THERAPY | Facility: REHABILITATION | Age: OVER 89
End: 2025-06-26
Attending: FAMILY MEDICINE
Payer: MEDICARE

## 2025-06-26 DIAGNOSIS — I89.0 LYMPHEDEMA OF LEFT LOWER EXTREMITY: Primary | ICD-10-CM

## 2025-06-26 PROCEDURE — 97110 THERAPEUTIC EXERCISES: CPT

## 2025-06-26 PROCEDURE — 97535 SELF CARE MNGMENT TRAINING: CPT

## 2025-06-26 NOTE — OP THERAPY PROGRESS SUMMARY
Outpatient Physical Therapy  PROGRESS SUMMARY NOTE      [facility] [place] [address]  [address]  [place] [address]  Phone:  [phone]  Fax:  [phone]    Date of Visit: 06/26/2025    Patient: Chantelle Sánchez  YOB: 1927  MRN: [id number]     Referring Provider: Sherman Quiroga M.D.  [address] [place] [address]   Referring Diagnosis Lymphedema, not elsewhere classified [I89.0]     Visit Diagnoses     ICD-10-CM   1. Lymphedema of left lower extremity  I89.0       Rehab Potential: good    Progress Report Period: 5/28/25-6/26/25    Functional Assessment Used          Objective Findings and Assessment:   Patient progression towards goals: Chantelle's B LE are softening with a  3cm reduction to her L LE. Her mobility is greatly limited by pain to her L knee, but after discussion today, her pain does not necessarily increase with activity, but is simply present. Encouraged increasing her activity to see if mobilizing can assist with healing. She will benefit from further intervention to reduce her swelling and improve her mobility.       Objective findings and assessment details:   Left Lower Extremity Circumferential Measurements 6/26/25  Knee: 41.7 cm  Upper Calf: 40 cm  Mid Calf: 26.4 cm  Ankle: 22.7 cm  Heel to Foot: 21.9 cm  Total: 152.7 cm      Left Lower Extremity Circumferential Measurements EVAL  Knee: 41.2 cm  Upper Calf: 41 cm  Mid Calf: 28.6 cm  Ankle: 22.4 cm  Heel to Foot: 22.5 cm  Total: 155.7 cm      Goals:   Short Term Goals:    1. Chantelle will obtain and wear her Velcro compression daily to reduce her L LE by 5cm. Partially met- 3cm and missing foot pieces still  2. Chantelle will perform targeted seated LE exercises while wearing compression to improve fluid movement and muscle pump to her L LE. Not met- limited by knee pain    Short term goal time span:  2-4 weeks      Long Term Goals:    1. Pt will have a reduction in total limb circumference  of 8cm in order to decrease risk for infection and progression of disease process. Not Met  2. Patient will be independent with maintenance phase management of lymphedema including: compression garments, skin care education, risk reduction strategies, manual lymphatic drainage, and therapeutic exercise. Not Met    Long term goal time span:  4-6 weeks    Plan:   Planned therapy interventions:  Functional Training, Self Care (CPT 81132), Manual Therapy (CPT 43246), Therapeutic Activities (CPT 71262) and Therapeutic Exercise (CPT 02483)  Frequency:  1x week  Duration in weeks:  8      Referring provider co-signature:  I have reviewed this plan of care and my co-signature certifies the need for services.     Certification Period: 06/26/2025 to 08/21/25    Physician Signature: ________________________________ Date: ______________

## 2025-06-26 NOTE — OP THERAPY DAILY TREATMENT
"  Outpatient Physical Therapy  LYMPHEDEMA THERAPY DAILY TREATMENT     [facility] [address].  [address]  [place] [address]  Phone:  [phone]  Fax:  [phone]    Date: 06/26/2025    Patient: Chantelle Sánchez  YOB: 1927  MRN: [id number]     Time Calculation    Start time: 1037  Stop time: 1121 Time Calculation (min): 44 minutes         Chief Complaint: Lymphedema Aquired    Visit #: 4    Subjective:   History of Present Illness:     Mechanism of injury:  Legs are softening but feel very heavy and L knee is so painful that she has had problems walking        Lymphedema Objective    Left Lower Extremity Circumferential Measurements  Waist: cm  Hip: cm  Scrotum: cm  Ground/Upper Thigh: cm  Mid Thigh: cm  Knee: 41.7 cm  Upper Calf: 40 cm  Mid Calf: 26.4 cm  Ankle: 22.7 cm  Heel to Foot: 21.9 cm  Total: 152.7 cm          Left Lower Extremity Circumferential Measurements 6//17  Waist: cm  Hip: cm  Scrotum: cm  Ground/Upper Thigh: cm  Mid Thigh: cm  Knee: 42.6 cm  Upper Calf: 40.6 cm  Mid Calf: 28.2 cm  Ankle: 22.2 cm  Heel to Foot: 21.9 cm  Total: 155.5 cm      Left Lower Extremity Circumferential Measurements EVAL  Waist: cm  Hip: cm  Scrotum: cm  Ground/Upper Thigh: cm  Mid Thigh: cm  Knee: 41.2 cm  Upper Calf: 41 cm  Mid Calf: 28.6 cm  Ankle: 22.4 cm  Heel to Foot: 22.5 cm  Total: 155.7 cm    Therapeutic Exercises (CPT 86365):     1. NuStep level 0 4mins, pain to L knee, but did not increase    2. seated LAQ, B LE x10 each      Therapeutic Exercise Summary: Discussed the importance of mobility and assessment of L knee pain. Pt reporting it \"always\" hurts. During the above exercises, the knee hurt but did not increase in pain with activity. Educated pt on concept of not all hurt is harm. Discussed an easy method of increasing her mobility is to add steps/walk each time she has to go to the restroom. Especially as her knee pain does not increase, simply stays the " same. She has verbalized understanding to the risks of immobility.     Therapeutic Treatments and Modalities:     1. Self Care ADL Training (CPT 36193)    Therapeutic Treatment and Modalities Summary: -educated pt's daughter on application of Solaris Ready Wrap to L LE. Daughter was applying it correctly, simply not enough compression.   -also discussed considering imaging as pt reporting her knee pain is not improving.     Time-based treatments/modalities:    Physical Therapy Timed Treatment Charges  Functional training, self care minutes (CPT 34775): 12 minutes  Therapeutic exercise minutes (CPT 50252): 32 minutes      Assessment and Plan:   Assessment details:  Chantelle's B LE are softening with a  3cm reduction to her L LE. Her mobility is greatly limited by pain to her L knee, but after discussion today, her pain does not necessarily increase with activity, but is simply present. Encouraged increasing her activity to see if mobilizing can assist with healing. She will benefit from further intervention to reduce her swelling and improve her mobility.     Plan:  Therapy options:  Physical therapy treatment to continue  Discussed with:  Patient and family

## 2025-07-01 ENCOUNTER — OFFICE VISIT (OUTPATIENT)
Dept: VASCULAR LAB | Facility: MEDICAL CENTER | Age: OVER 89
End: 2025-07-01
Payer: MEDICARE

## 2025-07-01 VITALS
SYSTOLIC BLOOD PRESSURE: 109 MMHG | WEIGHT: 140 LBS | HEIGHT: 60 IN | BODY MASS INDEX: 27.48 KG/M2 | DIASTOLIC BLOOD PRESSURE: 68 MMHG | HEART RATE: 73 BPM

## 2025-07-01 DIAGNOSIS — R60.0 BILATERAL LEG EDEMA: Primary | ICD-10-CM

## 2025-07-01 DIAGNOSIS — Z95.5 S/P CORONARY ARTERY STENT PLACEMENT: ICD-10-CM

## 2025-07-01 DIAGNOSIS — I71.43 INFRARENAL ABDOMINAL AORTIC ANEURYSM (AAA) WITHOUT RUPTURE (HCC): ICD-10-CM

## 2025-07-01 DIAGNOSIS — E78.00 PURE HYPERCHOLESTEROLEMIA: ICD-10-CM

## 2025-07-01 DIAGNOSIS — R79.89 ELEVATED BRAIN NATRIURETIC PEPTIDE (BNP) LEVEL: ICD-10-CM

## 2025-07-01 DIAGNOSIS — I89.0 LYMPHEDEMA OF LEFT LOWER EXTREMITY: ICD-10-CM

## 2025-07-01 PROCEDURE — 99214 OFFICE O/P EST MOD 30 MIN: CPT

## 2025-07-01 PROCEDURE — 3078F DIAST BP <80 MM HG: CPT

## 2025-07-01 PROCEDURE — 3074F SYST BP LT 130 MM HG: CPT

## 2025-07-01 PROCEDURE — 99212 OFFICE O/P EST SF 10 MIN: CPT

## 2025-07-01 ASSESSMENT — FIBROSIS 4 INDEX: FIB4 SCORE: 1.72

## 2025-07-01 ASSESSMENT — ENCOUNTER SYMPTOMS
CHILLS: 0
WHEEZING: 0
PALPITATIONS: 0
SPUTUM PRODUCTION: 0
COUGH: 0
PND: 0
CLAUDICATION: 0
FEVER: 0
ORTHOPNEA: 0
HEMOPTYSIS: 0
SHORTNESS OF BREATH: 0

## 2025-07-01 NOTE — PROGRESS NOTES
Follow Up VASCULAR MEDICINE CLINIC VISIT  07/01/2025     Chantelle Snáchez is a 89 y.o. female referred for vascular medicine eval/mgmt of LE edema, varicose veins, est 5/2025  Referring provider: Tali López M.D.     Subjective    Barriers to care:  age, mild frailty, limited mobility due to L knee pain, increasing reliance on w/c for mobility, uses walker at home     Last seen 5/2025  Doing well  Still having ongoing pain and swelling on left knee  Has followed up with pain specialist after gel injections  Has not followed up with ortho or pcp for ongoing knee pain  Has established with lymphedema PT  Is seeing some improvement in leg swelling - using wraps, was not putting on tight enough  Did not notice any improvement with increased dose of torsemide  - but did have increase in incontinence due to decreased mobility with knee pain  Is pending pumps did imaging - reviewed       EDEMA:  Initial visit hx: seen by PCP 3/2025 and noted to have L > RLE swelling.  Recent fall last week and has new bruising and  Has been seeing pain mgmt with gel injections.    Has had PT due to knee pain.  Has tried torsemide and not improving.     Initial hx of symptoms/pattern:  progressive worsening over 6mo, less mobility over that time.    Edema-causing conditions:    Increase hydrostatic pressure:   # pulm HTN - per prior echo 1/2024   # CVI with hx of venous ablations vs stripping in the past   # grade I diast dys, prior mild high NTproBNP - no mention of HFpEF in cardiology notes   Decreased oncotic pressure/hypoalbuminuria: None  Increased interstitial oncotic pressure (Hypothyroidism): none  Decreased lymphatic drainage (primary vs secondary): none   Increased capillary permeability: None   Meds possibly contributing to edema:  trazodone  Prior work-up:  2/2025 neg BLE venous duplex for DVT   Tx attempted to date:    Compression? Unable to use stockings, trying compression wraps    Diuretics? Torsemide - not effective     Other? Elevation does not     CAD, s/p PCI - stable, no sx, seeing  cardiology     AAA, stable, 3.0cm on most imaging w/o growth, seeing cardiology   Did imaging - reviewed      HTN:  Stable, tolerating meds with good adherence, Home BP log: not checking     HLD: Stable, current treatment: High intensity statin - tolerating, good adherence     Dysglycemia: no    Antithrombotic tx: plavix - no hx of bleeding      Patient Active Problem List    Diagnosis Date Noted    Infrarenal abdominal aortic aneurysm (AAA) without rupture (HCC) 06/10/2025    Grade I diastolic dysfunction 05/21/2025    Moderate aortic insufficiency 05/21/2025    Primary insomnia 04/27/2025    Abnormal renal function test 04/27/2025    Pedal edema 04/18/2025    Aneurysm (HCC) 04/11/2024    Status post ablation of atrial fibrillation 04/11/2024    Aortic valve regurgitation 04/11/2024    Hyperlipidemia 04/11/2024    CAD (coronary artery disease) 01/22/2024    Primary osteoarthritis of both knees 10/12/2023    History of MI (myocardial infarction) 09/11/2023    S/P coronary artery stent placement 09/11/2023    Chronic bilateral low back pain without sciatica 09/11/2023    Chronic pain of both knees 09/11/2023    Chronic cough 09/11/2023    Seasonal allergies 09/11/2023    Macular degeneration of right eye 09/11/2023      Past Surgical History[1]   Family History   Problem Relation Age of Onset    Heart Disease Son     Hyperlipidemia Son     Hyperlipidemia Daughter       Medications Ordered Prior to Encounter[2]   Allergies[3]     Social History[4]  DIET AND EXERCISE:  Weight Change: stable   Diet: common adult  Exercise: limited mobility due to L knee pain, swelling      Review of Systems   Constitutional:  Negative for chills, fever and malaise/fatigue.   Respiratory:  Negative for cough, hemoptysis, sputum production, shortness of breath and wheezing.    Cardiovascular:  Positive for leg swelling. Negative for chest pain, palpitations, orthopnea,  claudication and PND.          Objective    Vitals:    07/01/25 1450   BP: 109/68   BP Location: Left arm   Patient Position: Sitting   BP Cuff Size: Adult   Pulse: 73   Weight: 63.5 kg (140 lb)   Height: 1.524 m (5')      BP Readings from Last 4 Encounters:   07/01/25 109/68   05/21/25 135/77   04/18/25 126/60   03/31/25 120/66      Body mass index is 27.34 kg/m².   Wt Readings from Last 4 Encounters:   07/01/25 63.5 kg (140 lb)   05/21/25 65.8 kg (145 lb)   04/18/25 65.3 kg (144 lb)   03/31/25 63.7 kg (140 lb 6.4 oz)      Physical Exam  Constitutional:       General: She is not in acute distress.     Appearance: Normal appearance. She is not diaphoretic.   HENT:      Head: Normocephalic and atraumatic.   Eyes:      Conjunctiva/sclera: Conjunctivae normal.   Cardiovascular:      Rate and Rhythm: Normal rate and regular rhythm.      Heart sounds: Normal heart sounds.      Comments: Stemmer's negative bilat   No abd wall varicosities     Spider telangectasia:       RLE:  None      LLE: none   Varicosities:           RLE: none      LLE: none   Corona phlebectatica:      RLE:  None        LLE:  None   Cording:         RLE:  None     LLE: None       Pulmonary:      Effort: Pulmonary effort is normal. No respiratory distress.      Breath sounds: Normal breath sounds.   Musculoskeletal:      Right lower leg: No edema.      Left lower leg: No edema.        Legs:    Lymphadenopathy:      Lower Body: No left inguinal adenopathy.   Skin:     General: Skin is warm and dry.      Findings: No rash or wound.          Neurological:      Mental Status: She is alert and oriented to person, place, and time.      Cranial Nerves: No cranial nerve deficit.      Gait: Gait is intact.   Psychiatric:         Mood and Affect: Mood and affect normal.          DATA REVIEW    Lab Results   Component Value Date/Time    CHOLSTRLTOT 160 10/04/2024 08:07 AM    LDL 93 10/04/2024 08:07 AM    HDL 45 10/04/2024 08:07 AM    TRIGLYCERIDE 109 10/04/2024  "08:07 AM       Lab Results   Component Value Date/Time    LDL 93 10/04/2024 08:07 AM       No results found for: \"LIPOPROTA\"   No results found for: \"APOB\"   No results found for: \"CRPHIGHSEN\"      Lab Results   Component Value Date/Time    SODIUM 140 05/30/2025 03:45 PM    POTASSIUM 3.9 05/30/2025 03:45 PM    CHLORIDE 103 05/30/2025 03:45 PM    CO2 24 05/30/2025 03:45 PM    GLUCOSE 66 05/30/2025 03:45 PM    BUN 23 (H) 05/30/2025 03:45 PM    CREATININE 0.76 05/30/2025 03:45 PM     Lab Results   Component Value Date/Time    ALKPHOSPHAT 101 (H) 01/22/2024 07:15 PM    ASTSGOT 31 01/22/2024 07:15 PM    ALTSGPT 29 01/22/2024 07:15 PM    TBILIRUBIN 0.3 01/22/2024 07:15 PM       No results found for: \"HBA1C\"    No results found for: \"MICROALBCALC\", \"MALBCRT\", \"MALBEXCR\", \"NPGOVG23\", \"MICROALBUR\", \"MICRALB\", \"UMICROALBUM\", \"MICROALBTIM\"      IMAGING:    TTE 1/23/24  Normal right and left ventricular size and function.   The ejection fraction is measured to be 72% by Archibald's biplane.  Grade I diastolic dysfunction.  Aortic valve sclerosis without significant stenosis.  Moderate aortic insufficiency.  Calcified aortic valve leaflets with what appears to be some mobile   components, consider endocarditis if clinically indicated.  Moderate tricuspid regurgitation.  Normal right atrial pressure.   Mild pulmonary hypertension, PASP of 39mmHg.   No prior study is available for comparison.      5/24/2024  [facility]   Conclusion:   Rare PACs and PVCs   Average HR 73bpm   No concerning arrhythmias   No patient events      Aota/iliac ultrasound 7/2024   An abdominal mid aortic aneurysm is noted with a maximum diameter of 3.01 x    3.01 cm.    No stenosis of the proximal segment of the major visceral arteries.    Waveforms and velocities of the abdominal aorta and bilateral iliac arteries    are normal with no hemodynamically significant stenosis.    No prior study is available for comparison.     2/2025 BLE venous   No evidence of " "bilateral lower extremity deep venous thrombosis.   5.7 x 1.8 x 3.6 cm right Baker's cyst    LLE venous duplex 5/2025   Normal left lower extremity superficial and deep venous examination.     CT pelvis with 6/2025  1. Abdominal aortic aneurysm present and partially seen measuring at least 3.2 cm. CT abdomen recommended to determine if the aneurysm is larger.     2. No pelvic mass, adenopathy or fluid.     3. Iliac veins enhance normally. No compression or narrowing suggested.     4. Benign fatty 8 x 2 cm lipoma in the upper lateral thigh musculature, considered incidental.        PROCEDURES:     LHC, PCI     S/p EP ablation for AF          Medical Decision Making:  Today's Assessment / Status / Plan:     1. Bilateral leg edema        2. Infrarenal abdominal aortic aneurysm (AAA) without rupture (HCC)        3. Elevated brain natriuretic peptide (BNP) level        4. S/P coronary artery stent placement        5. Lymphedema of left lower extremity        6. 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\"), s/p venous ablation in the past   - prior w/u for DVT neg 2/2025, but due to progression and recurrent injections of gel would suggest repeat  - unclear if gel extravasation could obstruct lymph fluid outflow   - possible that progressive venous HTN now leading to progressive unilat lymph system stenosis, clearly has worsen CVI stigmata on L > R, suggesting higher venous HTN  - no evidence of  lymph system outflow from other extrinsic masses (soft tissue or obstructive mass) per CT pelvis 6/2025   - no VTE LLE 5/2025  - no signs of overt HF as would likely be BLE edema   - no indications of baseline hep, renal, thyroid dz   LLE CEAP classification: C4aS / Ep / Ap / Pr   RLE CEAP classification: C3A / Ep / Ap / Pr   Plan:  - continue  foot/LLE leg compression wraps   - continue with lymphedema clinic for MLD " "tx pending pumps   - active calf pumping exercises if prolonged standing and elevate legs above heart level while sitting and sleeping   - reduce sodium (\"salt\") in diet to less than 2,000mg daily   - continue daily moisturizing lotion (such as Gold Bond Diabetic Foot Cream)  Medications:    - in general, diuretics will not help with CVI-assoc edema and should be avoided   - preferred evidence-based oral agents per 2023 SVS guidelines include MPFF (Rx Vasculera or OTC diosmin/hespiridin), Ruscus extract ('s broom extract)  - other available agents include horse chestnut seed extract   - may consider OTC MPFF (brand with evidence-based dosing regimen is Doctor's Best Vein Support)in future as can help CVI and lymphedema    2) CAD, s/p MI, s/p PIC - stable, no sx, continue ongoing care with cardiology     3) hx of AF, s/p ablation - stable , ongoing care with cardiology     4) valvular HD (moderate AI and TR per echo) - no indications of fluid overload syncope, ongoing care with cardiology     5) mild PH, grade 1 diast dys - no overt RANDALL or fluid overload   - has not responded to loop so unlikely cause of edema  - ongoing care with cardiology     BLOOD PRESSURE CONTROL   Office BP goal per ACC/AHA <130/80  Home BP at goal:  yes  Office BP at goal:  no mild RAFA   24h ABPM:  not ordered to date   RDN candidate? NO   Increased incontinence with increased torsemide dose  Plan:   - start/continue home BP monitoring, reviewed correct technique, provide BP log and instructions  - order 24h ABPM:  NO  - routine monitoring of lytes/gfr   Medications:  - continue torsemide to 10-20mg qam with Kcl (can benefit BP, pulm HTN but unlikely beneficial for phlebolymphedema)   - continue metoprolol 50mg ER daily (hx MI, AF)    LIPID MANAGEMENT  Qualifies for Statin Therapy per ACC/AHA Guidelines: yes, Secondary Prevention - Very high risk ASCVD - 2 major events or 1 event with other high-risk conditions  Major ASCVD events: " History of prior MI >12 months ago     High-risk conditions: >64yr old , Hypertension , and CKD (egfr 15-59)  Lipoprotein(a): Not available  Currently on Statin: Yes  Tx goals: LDL-C <55  though is prognosis related at this time due to age   At goal? No, 10/2024   Plan:   - continue atorva 80mg - deprescribe based upon px and life-expectancy over time  - discussed today, pt will consider stopping in future, continue for now.      GLYCEMIC MANAGEMENT Normal    ANTITHROMBOTIC THERAPY: Yes , continue plavix 75mg daily as per cardiology     LIFESTYLE INTERVENTIONS  TOBACCO:    reports that she has never smoked. She has never used smokeless tobacco.   - continued complete avoidance of all tobacco products     PHYSICAL ACTIVITY: >150min/week of mod-intensity activity or as much as tolerated    NUTRITION: Mediterranean and reduce Na to <1,500mg/day     ETOH: does not drink    WT MGMT: maintain current healthy wt     OTHER:  none     STUDIES:  none  FOLLOW-UP: 6 months,  ongoing with lymphedema clinic       KAYDEN Jean   [facility]  [phone]            [1]   Past Surgical History:  Procedure Laterality Date    MD NEUROLYTIC DEST GENICULAR NERVE Left 02/05/2025    Procedure: LEFT genicular nerve neurotomy with sedation….Note: Patient needs clearance to be off of Plavix for 5 days prior to the procedure.  Plan on the left knee approximately 2 weeks before the right knee.  Plan on sedation with 25mcg fentanyl.;  Surgeon: Enrique Louise M.D.;  [facility];  Service: Pain Management    MD INJ AA/STRD GNCLR NRV BRNCH W/IMG Bilateral 01/14/2025    Procedure: bilateral knee genicular nerve blocks with fluoroscopic guidance;  Surgeon: Enrique Louise M.D.;  Location[facility];  Service: Pain Management    MD INJ AA/STRD GNCLR NRV BRNCH W/IMG Left 11/06/2024    Procedure: LEFT knee genicular  nerve blocks with fluoroscopic guidance….Note: OK to continue plavix. DEA guidelines reviewed. Left knee approximately 1 week before the right.;  Surgeon: Enrique Louise M.D.;  Location[facility];  Service: Pain Management    WA INJ LUMBAR/SACRAL,W/ IMAGING N/A 04/23/2024    Procedure: Lumbar L5-S1 interlaminar epidural steroid injection…..Note: Patient needs clearance to be off of Plavix for 5 days prior to the procedure;  Surgeon: Enrique Louise M.D.;  Location: [facility];  Service: Pain Management    CARPAL TUNNEL RELEASE      CATARACT EXTRACTION WITH IOL      KNEE ARTHROSCOPY      ORIF, WRIST  1970    broken    OTHER ABDOMINAL SURGERY  1985    OTHER CARDIAC SURGERY  06/2020    OTHER ORTHOPEDIC SURGERY      toe    RHINOPLASTY     [2]   Current Outpatient Medications on File Prior to Visit   Medication Sig Dispense Refill    atorvastatin (LIPITOR) 80 MG tablet Take 1 Tablet by mouth every evening. 90 Tablet 1    metoprolol SR (TOPROL XL) 50 MG TABLET SR 24 HR Take 1 Tablet by mouth every day. 90 Tablet 1    torsemide (DEMADEX) 10 MG tablet Take 1 Tablet by mouth every day. 90 Tablet 0    traZODone (DESYREL) 50 MG Tab Take 1 Tablet by mouth every evening. 90 Tablet 1    clopidogrel (PLAVIX) 75 MG Tab Take 1 Tablet by mouth every day. 90 Tablet 1    potassium chloride ER (KLOR-CON) 10 MEQ tablet Take 1 Tablet by mouth every day. 180 Tablet 0    lansoprazole (PREVACID) 30 MG CAPSULE DELAYED RELEASE TAKE 1 CAPSULE BY MOUTH EVERY DAY 90 Capsule 2    Dextromethorphan-guaiFENesin (MUCINEX DM)  MG TABLET SR 12 HR Take 1 Each by mouth 3 times a day as needed (cough). 30 Tablet 0    ipratropium-albuterol (DUONEB) 0.5-2.5 (3) MG/3ML nebulizer solution Take 3 mL by nebulization every 6 hours as needed for Shortness of Breath (wheezing). 30 Each 0    albuterol 108 (90 Base) MCG/ACT Aero Soln inhalation aerosol Inhale 2 Puffs every four hours as needed for Shortness of Breath.  1 Each 1    fluticasone (FLONASE) 50 MCG/ACT nasal spray Administer 1 Spray into affected nostril(S) every day.      cetirizine (ZYRTEC) 10 MG Tab Take 10 mg by mouth every day.       No current facility-administered medications on file prior to visit.   [3] No Known Allergies  [4]   Social History  Tobacco Use    Smoking status: Never    Smokeless tobacco: Never   Vaping Use    Vaping status: Never Used   Substance Use Topics    Alcohol use: Not Currently    Drug use: Not Currently      No

## 2025-07-07 ENCOUNTER — PHYSICAL THERAPY (OUTPATIENT)
Dept: PHYSICAL THERAPY | Facility: REHABILITATION | Age: OVER 89
End: 2025-07-07
Attending: FAMILY MEDICINE
Payer: MEDICARE

## 2025-07-07 DIAGNOSIS — R60.9 LIPEDEMA: ICD-10-CM

## 2025-07-07 DIAGNOSIS — I89.0 LYMPHEDEMA OF LEFT LOWER EXTREMITY: Primary | ICD-10-CM

## 2025-07-07 PROCEDURE — 97535 SELF CARE MNGMENT TRAINING: CPT

## 2025-07-07 NOTE — OP THERAPY DAILY TREATMENT
Outpatient Physical Therapy  LYMPHEDEMA THERAPY DAILY TREATMENT     [facility] [address].  [address]  [place] [address]  Phone:  [phone]  Fax:  [phone]    Date: 07/07/2025    Patient: Chantelle Sánchez  YOB: 1927  MRN: [id number]     Time Calculation    Start time: 1415  Stop time: 1444 Time Calculation (min): 29 minutes         Chief Complaint: Lipolymphedema    Visit #: 5    Subjective:   History of Present Illness:     Mechanism of injury:  Leg seems softer.       Lymphedema Objective    Left Lower Extremity Circumferential Measurements  Waist: cm  Hip: cm  Scrotum: cm  Ground/Upper Thigh: cm  Mid Thigh: cm  Knee: 42.1 cm  Upper Calf: 38.2 cm  Mid Calf: 26.4 cm  Ankle: 22.4 cm  Heel to Foot: 21.9 cm  Total: 151 cm      Left Lower Extremity Circumferential Measurements 6/26/25  Knee: 41.7 cm  Upper Calf: 40 cm  Mid Calf: 26.4 cm  Ankle: 22.7 cm  Heel to Foot: 21.9 cm  Total: 152.7 cm        Left Lower Extremity Circumferential Measurements EVAL  Knee: 41.2 cm  Upper Calf: 41 cm  Mid Calf: 28.6 cm  Ankle: 22.4 cm  Heel to Foot: 22.5 cm  Total: 155.7 cm        Therapeutic Exercises (CPT 27323):     1. NuStep level 0 4mins    2. seated LAQ    Therapeutic Treatments and Modalities:     1. Self Care ADL Training (CPT 97445)    Therapeutic Treatment and Modalities Summary: -discussed reduction to L LE as well as softening of tissue; encouraged pt and daughter to continue with application of compression  -pt declined mobility training today (gait/LE exercise); discussed direction of this intervention. Pt reporting too much discomfort to her L knee to perform exercise etc. Has not increased her walking at home.     Time-based treatments/modalities:    Physical Therapy Timed Treatment Charges  Functional training, self care minutes (CPT 72955): 29 minutes      Assessment and Plan:   Assessment details:  Chantelle is improving her L LE fluid accumulation as  the tissue has softened and she is achieving gradual reduction. Unfortunately, her largest impairment is related to her L knee pain. The presence of the pain is increasing her time being sedentary and also limiting her social interaction. She was uninterested in LE exercise/gait training today likely due to fear of pain (last session, was able to participate without increasing the pain). Discussed POC and encouraged her to return once she has all of her compressive items.      Plan:  Therapy options:  Physical therapy treatment to continue  Discussed with:  Patient and family

## 2025-07-14 ENCOUNTER — APPOINTMENT (OUTPATIENT)
Dept: PHYSICAL THERAPY | Facility: REHABILITATION | Age: OVER 89
End: 2025-07-14
Attending: FAMILY MEDICINE
Payer: MEDICARE

## 2025-07-22 ENCOUNTER — PHYSICAL THERAPY (OUTPATIENT)
Dept: PHYSICAL THERAPY | Facility: REHABILITATION | Age: OVER 89
End: 2025-07-22
Attending: FAMILY MEDICINE
Payer: MEDICARE

## 2025-07-22 DIAGNOSIS — R60.9 LIPEDEMA: ICD-10-CM

## 2025-07-22 DIAGNOSIS — I89.0 LYMPHEDEMA OF LEFT LOWER EXTREMITY: Primary | ICD-10-CM

## 2025-07-22 PROCEDURE — 97535 SELF CARE MNGMENT TRAINING: CPT

## 2025-07-22 PROCEDURE — 97140 MANUAL THERAPY 1/> REGIONS: CPT

## 2025-07-22 NOTE — OP THERAPY DAILY TREATMENT
Outpatient Physical Therapy  LYMPHEDEMA THERAPY DAILY TREATMENT     [facility] [address]  [address]  [place] [address]  Phone:  [phone]  Fax:  [phone]    Date: 07/22/2025    Patient: Chantelle Sánchez  YOB: 1927  MRN: [id number]     Time Calculation    Start time: 1416  Stop time: 1503 Time Calculation (min): 47 minutes         Chief Complaint: Lipolymphedema    Visit #: 6    Subjective:   History of Present Illness:     Mechanism of injury:  L leg not doing so great. Knee is painful and cannot do much at all. Got trapped in recliner the other day when the power went out.       Lymphedema Objective    Left Lower Extremity Circumferential Measurements  Waist: cm  Hip: cm  Scrotum: cm  Ground/Upper Thigh: cm  Mid Thigh: cm  Knee: 43.2 cm  Upper Calf: 38.9 cm  Mid Calf: 26.3 cm  Ankle: 22.4 cm  Heel to Foot: 22 cm  Total: 152.8 cm    Right Lower Extremity Circumferential Measurements  Waist: cm  Hip: cm  Scrotum: cm  Ground/Upper Thigh: cm  Mid Thigh: cm  Knee: 42.7 cm  Upper Calf: 39.6 cm  Mid Calf: 25.4 cm  Ankle: 22 cm  Heel to Foot: 21.7 cm  Total: 151.4 cm        Left Lower Extremity Circumferential Measurements  Waist: cm  Hip: cm  Scrotum: cm  Ground/Upper Thigh: cm  Mid Thigh: cm  Knee: 42.1 cm  Upper Calf: 38.2 cm  Mid Calf: 26.4 cm  Ankle: 22.4 cm  Heel to Foot: 21.9 cm  Total: 151 cm        Left Lower Extremity Circumferential Measurements 6/26/25  Knee: 41.7 cm  Upper Calf: 40 cm  Mid Calf: 26.4 cm  Ankle: 22.7 cm  Heel to Foot: 21.9 cm  Total: 152.7 cm        Left Lower Extremity Circumferential Measurements EVAL  Knee: 41.2 cm  Upper Calf: 41 cm  Mid Calf: 28.6 cm  Ankle: 22.4 cm  Heel to Foot: 22.5 cm  Total: 155.7 cm    Therapeutic Treatments and Modalities:     1. Self Care ADL Training (CPT 70474)    2. Manual Therapy (CPT 77235)    Therapeutic Treatment and Modalities Summary:   Manual:  Fibrotic techniques to B distal LE. Pt with  hyperalgesia to R anterior tibia. MLD to B LE to improve lymphatic movement and flow. [employer] Ready Wrap compression to her distal L LE.     Functional Training:  Discussed continuation of utilizing compression as often as possible. She is awaiting a vasopneumatic pump demonstration. She is also awaiting an appointment with her pain doctor. Encouraged her to communicate her significant reduction in mobility and quality of life due to L knee.     Time-based treatments/modalities:    Physical Therapy Timed Treatment Charges  Functional training, self care minutes (CPT 05240): 27 minutes  Manual therapy minutes (CPT 93044): 20 minutes      Assessment and Plan:   Assessment details:  Chantelle is sustaining her measurements from previous assessment. However, her LE have improved the amount of fibrotic deposits and firm quality with use of compression. She is most limited by pain to her L knee, which she is hoping to have addressed next week. She will benefit from a check in in a few weeks to ensure she is tolerating her compression and sustaining her measurements.     Plan:  Therapy options:  Physical therapy treatment to continue  Discussed with:  Patient and family

## 2025-07-23 NOTE — OP THERAPY PROGRESS SUMMARY
Outpatient Physical Therapy  PROGRESS SUMMARY NOTE      [facility] Physical Therapy [place] [address]  [address] [place] [id number]  Phone:  [phone]  Fax:  [phone]    Date of Visit: 07/22/2025    Patient: Chantelle Sánchez  YOB: 1927  MRN: [id number]     Referring Provider: Sherman Quiroga M.D.  [address] [place] [address]   Referring Diagnosis Lymphedema, not elsewhere classified [I89.0]     Visit Diagnoses     ICD-10-CM   1. Lymphedema of left lower extremity  I89.0   2. Lipedema  R60.9       Rehab Potential: good    Progress Report Period: 6/26/25-7/22/25    Functional Assessment Used    LLIS      Objective Findings and Assessment:   Patient progression towards goals: Chantelle is sustaining her measurements from previous assessment. However, her LE have improved the amount of fibrotic deposits and firm quality with use of compression. She is most limited by pain to her L knee, which she is hoping to have addressed next week. She will benefit from a check in in a few weeks to ensure she is tolerating her compression and sustaining her measurements.       Objective findings and assessment details: Left Lower Extremity Circumferential Measurements 7/22/25  Knee: 43.2 cm  Upper Calf: 38.9 cm  Mid Calf: 26.3 cm  Ankle: 22.4 cm  Heel to Foot: 22 cm  Total: 152.8 cm     Right Lower Extremity Circumferential Measurements 7/22/25  Knee: 42.7 cm  Upper Calf: 39.6 cm  Mid Calf: 25.4 cm  Ankle: 22 cm  Heel to Foot: 21.7 cm  Total: 151.4 cm    Left Lower Extremity Circumferential Measurements 6/26/25  Knee: 41.7 cm  Upper Calf: 40 cm  Mid Calf: 26.4 cm  Ankle: 22.7 cm  Heel to Foot: 21.9 cm  Total: 152.7 cm      Left Lower Extremity Circumferential Measurements EVAL  Knee: 41.2 cm  Upper Calf: 41 cm  Mid Calf: 28.6 cm  Ankle: 22.4 cm  Heel to Foot: 22.5 cm  Total: 155.7 cm      Goals:   Short Term Goals:    1. Chantelle will obtain and wear her Velcro compression daily to  reduce her L LE by 5cm. Partially met- 3cm  2. Chantelle will perform targeted seated LE exercises while wearing compression to improve fluid movement and muscle pump to her L LE. Not met- limited by knee pain    Short term goal time span:  2-4 weeks      Long Term Goals:    1. Pt will have a reduction in total limb circumference of 8cm in order to decrease risk for infection and progression of disease process. Not Met  2. Patient will be independent with maintenance phase management of lymphedema including: compression garments, skin care education, risk reduction strategies, manual lymphatic drainage, and therapeutic exercise. Not Met    Long term goal time span:  4-6 weeks    Plan:   Planned therapy interventions:  Functional Training, Self Care (CPT 02887), Manual Therapy (CPT 74804), Therapeutic Activities (CPT 31613) and Therapeutic Exercise (CPT 12778)  Frequency:  2x month  Duration in weeks:  8      Referring provider co-signature:  I have reviewed this plan of care and my co-signature certifies the need for services.     Certification Period: 07/22/2025 to 09/16/25    Physician Signature: ________________________________ Date: ______________

## 2025-08-06 ENCOUNTER — APPOINTMENT (OUTPATIENT)
Dept: URBAN - METROPOLITAN AREA CLINIC 6 | Facility: CLINIC | Age: OVER 89
Setting detail: DERMATOLOGY
End: 2025-08-06

## 2025-08-06 DIAGNOSIS — L57.0 ACTINIC KERATOSIS: ICD-10-CM

## 2025-08-06 DIAGNOSIS — L82.0 INFLAMED SEBORRHEIC KERATOSIS: ICD-10-CM

## 2025-08-06 DIAGNOSIS — Z71.89 OTHER SPECIFIED COUNSELING: ICD-10-CM

## 2025-08-06 PROCEDURE — ? EDUCATIONAL RESOURCES PROVIDED

## 2025-08-06 PROCEDURE — ? LIQUID NITROGEN

## 2025-08-06 PROCEDURE — ? COUNSELING

## 2025-08-06 ASSESSMENT — LOCATION DETAILED DESCRIPTION DERM
LOCATION DETAILED: RIGHT SUPERIOR MEDIAL FOREHEAD
LOCATION DETAILED: LEFT SUPERIOR LATERAL MALAR CHEEK
LOCATION DETAILED: LEFT LATERAL CANTHUS
LOCATION DETAILED: LEFT INFERIOR CENTRAL MALAR CHEEK
LOCATION DETAILED: LEFT FOREHEAD
LOCATION DETAILED: LEFT SUPERIOR MEDIAL FOREHEAD
LOCATION DETAILED: RIGHT FOREHEAD
LOCATION DETAILED: LEFT INFERIOR TEMPLE
LOCATION DETAILED: RIGHT LATERAL ELBOW

## 2025-08-06 ASSESSMENT — LOCATION SIMPLE DESCRIPTION DERM
LOCATION SIMPLE: LEFT CHEEK
LOCATION SIMPLE: RIGHT ELBOW
LOCATION SIMPLE: RIGHT FOREHEAD
LOCATION SIMPLE: LEFT TEMPLE
LOCATION SIMPLE: LEFT FOREHEAD
LOCATION SIMPLE: LEFT EYELID

## 2025-08-06 ASSESSMENT — LOCATION ZONE DERM
LOCATION ZONE: ARM
LOCATION ZONE: FACE
LOCATION ZONE: EYELID

## 2025-08-08 DIAGNOSIS — K21.9 GASTROESOPHAGEAL REFLUX DISEASE, UNSPECIFIED WHETHER ESOPHAGITIS PRESENT: ICD-10-CM

## 2025-08-10 RX ORDER — LANSOPRAZOLE 30 MG/1
30 CAPSULE, DELAYED RELEASE ORAL
Qty: 90 CAPSULE | Refills: 0 | Status: SHIPPED | OUTPATIENT
Start: 2025-08-10

## 2025-08-27 DIAGNOSIS — F51.01 PRIMARY INSOMNIA: ICD-10-CM

## 2025-08-27 RX ORDER — TRAZODONE HYDROCHLORIDE 50 MG/1
50 TABLET ORAL NIGHTLY
Qty: 90 TABLET | Refills: 1 | Status: SHIPPED | OUTPATIENT
Start: 2025-08-27